# Patient Record
Sex: FEMALE | Race: WHITE | Employment: OTHER | URBAN - METROPOLITAN AREA
[De-identification: names, ages, dates, MRNs, and addresses within clinical notes are randomized per-mention and may not be internally consistent; named-entity substitution may affect disease eponyms.]

---

## 2022-03-02 ENCOUNTER — OFFICE VISIT (OUTPATIENT)
Dept: ORTHOPEDIC SURGERY | Age: 67
End: 2022-03-02
Payer: MEDICARE

## 2022-03-02 VITALS — WEIGHT: 110 LBS | BODY MASS INDEX: 19.49 KG/M2 | HEIGHT: 63 IN

## 2022-03-02 DIAGNOSIS — M12.811 ROTATOR CUFF ARTHROPATHY OF RIGHT SHOULDER: Primary | ICD-10-CM

## 2022-03-02 DIAGNOSIS — M25.511 RIGHT SHOULDER PAIN, UNSPECIFIED CHRONICITY: ICD-10-CM

## 2022-03-02 PROCEDURE — 1090F PRES/ABSN URINE INCON ASSESS: CPT | Performed by: ORTHOPAEDIC SURGERY

## 2022-03-02 PROCEDURE — G8427 DOCREV CUR MEDS BY ELIG CLIN: HCPCS | Performed by: ORTHOPAEDIC SURGERY

## 2022-03-02 PROCEDURE — 20610 DRAIN/INJ JOINT/BURSA W/O US: CPT | Performed by: ORTHOPAEDIC SURGERY

## 2022-03-02 PROCEDURE — 1123F ACP DISCUSS/DSCN MKR DOCD: CPT | Performed by: ORTHOPAEDIC SURGERY

## 2022-03-02 PROCEDURE — 99203 OFFICE O/P NEW LOW 30 MIN: CPT | Performed by: ORTHOPAEDIC SURGERY

## 2022-03-02 PROCEDURE — G8420 CALC BMI NORM PARAMETERS: HCPCS | Performed by: ORTHOPAEDIC SURGERY

## 2022-03-02 PROCEDURE — G8484 FLU IMMUNIZE NO ADMIN: HCPCS | Performed by: ORTHOPAEDIC SURGERY

## 2022-03-02 PROCEDURE — G8400 PT W/DXA NO RESULTS DOC: HCPCS | Performed by: ORTHOPAEDIC SURGERY

## 2022-03-02 PROCEDURE — 3017F COLORECTAL CA SCREEN DOC REV: CPT | Performed by: ORTHOPAEDIC SURGERY

## 2022-03-02 PROCEDURE — 4004F PT TOBACCO SCREEN RCVD TLK: CPT | Performed by: ORTHOPAEDIC SURGERY

## 2022-03-02 PROCEDURE — 4040F PNEUMOC VAC/ADMIN/RCVD: CPT | Performed by: ORTHOPAEDIC SURGERY

## 2022-03-02 RX ORDER — MELOXICAM 15 MG/1
15 TABLET ORAL DAILY
Qty: 30 TABLET | Refills: 5 | Status: SHIPPED | OUTPATIENT
Start: 2022-03-02 | End: 2022-05-04

## 2022-03-02 RX ORDER — METHYLPREDNISOLONE ACETATE 40 MG/ML
80 INJECTION, SUSPENSION INTRA-ARTICULAR; INTRALESIONAL; INTRAMUSCULAR; SOFT TISSUE ONCE
Status: COMPLETED | OUTPATIENT
Start: 2022-03-02 | End: 2022-03-02

## 2022-03-02 RX ORDER — LIDOCAINE HYDROCHLORIDE 20 MG/ML
4 INJECTION, SOLUTION INFILTRATION; PERINEURAL ONCE
Status: COMPLETED | OUTPATIENT
Start: 2022-03-02 | End: 2022-03-02

## 2022-03-02 RX ADMIN — LIDOCAINE HYDROCHLORIDE 4 ML: 20 INJECTION, SOLUTION INFILTRATION; PERINEURAL at 17:18

## 2022-03-02 RX ADMIN — METHYLPREDNISOLONE ACETATE 80 MG: 40 INJECTION, SUSPENSION INTRA-ARTICULAR; INTRALESIONAL; INTRAMUSCULAR; SOFT TISSUE at 17:19

## 2022-03-02 NOTE — PROGRESS NOTES
Chief Complaint    New Patient (R shoulder)      History of Present Illness:  Lindsey De Luna is a pleasant, 74 y.o. female here today on referral from her sister for evaluation of right shoulder pain. The patient states that she was in [place] a few weeks ago and was doing upper body workout when she started to have right shoulder pain. The pain is worse with overhead activities. She has been taking over the counter anti inflammatory without much relief. The pain wakes her up at night. The patient denies numbness or tingling. The patient has a history of open rotator cuff repair, right by Dr. Elvia Jackson around 20 years ago. .        Medical History:    Review of Systems   Constitutional: Positive for unexpected weight change. All other systems reviewed and are negative. Patient's medications, allergies, past medical, surgical, social and family histories were reviewed and updated as appropriate. Past Medical History:   Diagnosis Date    Anxiety     Cancer [facility])     COLON    Osteoarthritis       Social History     Socioeconomic History    Marital status:       Spouse name: Not on file    Number of children: Not on file    Years of education: Not on file    Highest education level: Not on file   Occupational History    Not on file   Tobacco Use    Smoking status: Former Smoker     Packs/day: 1.50     Years: 15.00     Pack years: 22.50     Quit date: 2001     Years since quittin.1    Smokeless tobacco: Never Used   Substance and Sexual Activity    Alcohol use: No    Drug use: No    Sexual activity: Not on file   Other Topics Concern    Not on file   Social History Narrative    Not on file     Social Determinants of Health     Financial Resource Strain:     Difficulty of Paying Living Expenses: Not on file   Food Insecurity:     Worried About [address] in the Last Year: Not on file    [address] in the Last Year: Not on HMari Ortiz Needs:     Lack of Transportation (Medical): Not on file    Lack of Transportation (Non-Medical): Not on file   Physical Activity:     Days of Exercise per Week: Not on file    Minutes of Exercise per Session: Not on file   Stress:     Feeling of Stress : Not on file   Social Connections:     Frequency of Communication with Friends and Family: Not on file    Frequency of Social Gatherings with Friends and Family: Not on file    Attends [employer]ism Services: Not on file    Active Member of [address] [place] [address] or Organizations: Not on file    Attends Club or Organization Meetings: Not on file    Marital Status: Not on file   Intimate Partner Violence:     Fear of Current or Ex-Partner: Not on file    Emotionally Abused: Not on file    Physically Abused: Not on file    Sexually Abused: Not on file   Housing Stability:     Unable to Pay for Housing in the Last Year: Not on file    Number of [place] in the Last Year: Not on file    Unstable Housing in the Last Year: Not on file       Allergies   Allergen Reactions    Sulfa Antibiotics Rash     High temp     Current Outpatient Medications on File Prior to Visit   Medication Sig Dispense Refill    citalopram (CELEXA) 40 MG tablet Take 40 mg by mouth daily      alendronate (FOSAMAX) 70 MG TABS Take by mouth      Multiple Vitamins-Minerals (THERAPEUTIC MULTIVITAMIN-MINERALS) tablet Take 1 tablet by mouth daily       No current facility-administered medications on file prior to visit. Review of Systems  A 14 point review of systems was completed by the patient on 3/2/2022 and is available in the media section of the scanned medical record and was reviewed on 3/2/2022. The review is negative with the exception of those things mentioned in the HPI and Past Medical History    Vital Signs: There were no vitals filed for this visit.     General Exam:   Constitutional: Patient is adequately groomed with no evidence of malnutrition      right Shoulder Rotator cuff arthropathy of right shoulder Yes       Office Procedures:  Orders Placed This Encounter   Procedures    XR SHOULDER RIGHT (MIN 2 VIEWS)     Standing Status:   Future     Number of Occurrences:   1     Standing Expiration Date:   3/2/2023     Order Specific Question:   Reason for exam:     Answer:   pain    [facility] Linette Pereira     Referral Priority:   Routine     Referral Type:   Eval and Treat     Referral Reason:   Specialty Services Required     Requested Specialty:   Physical Therapy     Number of Visits Requested:   1    AL ARTHROCENTESIS ASPIR&/INJ MAJOR JT/BURSA W/O US       Treatment Plan:  Pertinent imaging and examination findings were reviewed with Angel Baum. The etiology, natural history, and treatment options for the disorder were discussed. The roles of activity modifications, medications, cryotherapy and heat, injections, physical therapy, and surgical interventions were all described to the patient and questions were answered. We discussed the diagnosis of rotator cuff arthropathy. We discussed the conservative options including physical therapy, anti-inflammatories, and steroid injection. The patient would like to proceed with steroid injection during today's encounter. A prescription for meloxicam was sent to her pharmacy. Supervised formal physical therapy prescription was put in the system. After discussing the risks and benefits of a corticosteroid injection, Philip Marquez did state an understanding and gave verbal consent to proceed. After cleansing the injection site with Chlora-prep and using aseptic techniques,  2 CC of Depo Medrol 40mg/ml and 8 CC of 1% lidocaine were injected in the right  glenohumeral joint. She  tolerated the procedure well with no immediate adverse sequelae after the injection. A bandage was placed over the injection site. Appropriate post injections instructions were given to the patient.     Angel Baum will follow up in 4 weeks and/or as needed. They were in agreement with this plan and all questions were answered to the patient's satisfaction. They were encouraged to call with any questions. 5:08 PM  3/2/2022    May Ott MD  [facility] Clinical fellow    During this examination, IMay MD, functioned as a scribe for Dr. Arnie Allen. The history taking and physical examination were performed by Dr. Kevin Machuca. All counseling during the appointment was performed between the patient and Dr. Kevin Machuca.   ______________________  I was physically present and personally supervised the Orthopaedic Sports Medicine Fellow in the evaluation and development of a treatment plan for this patient. I personally interviewed the patient and performed a physical examination. In addition, I discussed the patient's condition and treatment options with them. I have also reviewed and agree with the past medical, family and social history unless otherwise noted. All of the patient's questions were answered. Corrie Machuca MD, PhD  3/2/2022

## 2022-03-02 NOTE — PROGRESS NOTES
Review of Systems   Constitutional: Positive for unexpected weight change. All other systems reviewed and are negative.

## 2022-03-02 NOTE — LETTER
Shoulder Elbow Rehabilitation Referral    Patient Name: Radha Garcia      YOB: 1955    Diagnosis:   1. Right shoulder pain, unspecified chronicity    2. Rotator cuff arthropathy of right shoulder          Post Op Instructions:  [] Continuous passive motion (CPM)  [] Elbow range of motion  [] Exercise in plane of scapula   []  Strengthening     [] Pulley and instruction    [x] Home exercise program (copy to patient)   [] Sling when arm at risk  [] Sling or brace at all times   [] AAROM: Forward elevation            [] AAROM: External rotation   [] Isometric external rotator strengthening [] AAROM: internal rotation: up the back  [] Isometric abductor strengthening  [] AAROM: Internal abduction     [] Isometric internal rotator strengthening [] AAROM: cross-body adduction             Stretching:     Strengthening:  [x] Four quadrant (FE, ER, IR, CBA)  [x] Rotator cuff (ER, IR, Abd)  [] Forward Elevation    [x] External Rotators     [] External Rotation    [x] Internal Rotators  [] Internal Rotation: up/back   [x] Abductors     [] Internal Rotation: supine in abduction  [] Flexors  [] Cross-body abduction    [] Extensors  [] Pendulum (FE, Abd/Add, cw/ccw)  [x] Scapular Stabilizers   [] Wall-walking (FE, Abd)    [x] Shoulder shrugs     [] Table slides      [x] Rhomboid pinch  [] Elbow (flex, ext, pron, sup)    [] Lat.  Pull downs     [] Medial epicondylitis program    [] Forward punch   [] Lateral epicondylitis program    [] Internal rotators     [] Progressive resistive exercises  [] Bench Press        [] Bench press plus  Activities:     [] Lateral pull-downs  [] Rowing     [x] Progressive two-hand supine press  [] Stepper/Exercise bike   [x] Biceps: curls/supination  [] Swimming  [] Water exercises    Modalities: PRN    Return to Sport:  [] Ultrasound     [] Plyometrics  [] Iontophoresis     [] Rhythmic stabilization  [] Moist heat     [] Core strengthening   [] Massage     [] Sports specific program:   [x] Cryotherapy      [] Electrical stimulation     [] Paraffin  [] Whirlpool  [] TENS    [x] Home exercise program (copy to patient). Perform exercises for:   15     minutes    2-3      times/day  [x] Supervised physical therapy  Frequency: []  1x week  [x] 2x week  [] 3x week  [] Other:   Duration: [] 2 weeks   [] 4 weeks  [x] 6 weeks  [] Other:     Additional Instructions:         Corrie Villafana MD, PhD

## 2022-03-08 ENCOUNTER — HOSPITAL ENCOUNTER (OUTPATIENT)
Dept: PHYSICAL THERAPY | Age: 67
Setting detail: THERAPIES SERIES
Discharge: HOME | End: 2022-03-08
Payer: MEDICARE

## 2022-03-08 PROCEDURE — 97110 THERAPEUTIC EXERCISES: CPT | Performed by: PHYSICAL THERAPIST

## 2022-03-08 PROCEDURE — 97161 PT EVAL LOW COMPLEX 20 MIN: CPT | Performed by: PHYSICAL THERAPIST

## 2022-03-08 NOTE — FLOWSHEET NOTE
[facility] and [address],  Sports Performance and Rehabilitation, [facility] [address]  [address] [facility],5[address] Floor   Jose Antonio Rodriguez  Phone: [phone]  Fax: [phone]      Physical Therapy Daily Treatment Note  Date:  3/8/2022    Patient Name:  Abelino Carnes    :  1955  MRN: [id number]  Restrictions/Precautions:    Medical/Treatment Diagnosis Information:  · Diagnosis: M25.511 (ICD-10-CM) - Right shoulder pain, unspecified [id number] (ICD-10-CM) - Rotator cuff arthropathy of right shoulder  ·    Insurance/Certification information:  PT Insurance Information: Medicare, 20%, no limit for visits  Physician Information:  Dr. Tree Mauricio  Has the plan of care been signed (Y/N):        []  Yes  [x]  No     Date of Patient follow up with Physician:       Is this a Progress Report:     []  Yes  [x]  No        If Yes:  Date Range for reporting period:  Beginning  Ending    Progress report will be due (10 Rx or 30 days whichever is less):  94    Recertification will be due (POC Duration  / 90 days whichever is less): 22         Visit # Insurance Allowable Auth Required   1 No limit []  Yes []  No        Functional Scale:45% UEFI   Date assessed: 3/8/22     Latex Allergy:  [x]NO      []YES  Preferred Language for Healthcare:   [x]English       []other:    Pain level:  6/10     SUBJECTIVE:  See eval     OBJECTIVE:   OBJECTIVE:     ROM PROM AROM  Comment    L R L R    Flexion WNL  148    Abduction WNL Pain at 90 165 154    ER WNL WNL      IR        Other  (cervical)        Other             Strength L R Comment   Flexion 4/5 4-/5    Abduction 4/5 4-/5 pain    ER 4+/5 4-/5 pain    IR 4-/5 4-/5    Supraspinatus      Upper Trap      Lower Trap      Mid Trap      Rhomboids      Biceps      Triceps      Horizontal Abduction      Horizontal Adduction      Lats            RESTRICTIONS/PRECAUTIONS: B RTC repair (20 years ago R, 6 years ago L)    Exercises/Interventions:   HEP:  Access Code: [email or web address]  URL: [email or web address]  Date: 03/08/2022  Prepared by: Job Hotter    Exercises  Seated Scapular Retraction - 1 x daily - 7 x weekly - 3 sets - 10 reps  Isometric Shoulder External Rotation at Wall - 1 x daily - 7 x weekly - 1 sets - 10 reps - 10 hold  Standing Isometric Shoulder Internal Rotation at Doorway - 1 x daily - 7 x weekly - 1 sets - 10 reps - 10 hold  Isometric Shoulder Flexion at Wall - 1 x daily - 7 x weekly - 1 sets - 10 reps - 10 hold    Exercises:  Exercise/Equipment Resistance/Repetitions Other comments   Stretching/PROM     Wand     Table Slides     UE [place]     Pulleys     Pendulum          Isometrics     Retraction X 30         Weight shift     Flexion 10 x 10 sec    Abduction     External Rotation 10 x 10 sec    Internal Rotation 10 x 10 sec    Biceps     Triceps          PRE's     Flexion     Abduction     External Rotation     Internal Rotation     Shrugs     EXT     Reverse Flys     Serratus     Horizontal Abd with ER     Biceps     Triceps     Retraction          Cable Column/Theraband     External Rotation     Internal Rotation     Shrugs     Lats     Ext     Flex     Scapular Retraction     BIC     TRIC     PNF          Dynamic Stability          Plyoback          Manual interventions                     Therapeutic Exercise and NMR EXR  [x] (63787) Provided verbal/tactile cueing for activities related to strengthening, flexibility, endurance, ROM  for improvements in scapular, scapulothoracic and UE control with self care, reaching, carrying, lifting, house/yardwork, driving/computer work.    [] (52507) Provided verbal/tactile cueing for activities related to improving balance, coordination, kinesthetic sense, posture, motor skill, proprioception  to assist with  scapular, scapulothoracic and UE control with self care, reaching, carrying, lifting, house/yardwork, driving/computer work.     Therapeutic Activities:    [] (12030 or 20480) Provided verbal/tactile cueing for activities related to improving balance, coordination, kinesthetic sense, posture, motor skill, proprioception and motor activation to allow for proper function of scapular, scapulothoracic and UE control with self care, carrying, lifting, driving/computer work.      Home Exercise Program:    [x] (30967) Reviewed/Progressed HEP activities related to strengthening, flexibility, endurance, ROM of scapular, scapulothoracic and UE control with self care, reaching, carrying, lifting, house/yardwork, driving/computer work  [] (48855) Reviewed/Progressed HEP activities related to improving balance, coordination, kinesthetic sense, posture, motor skill, proprioception of scapular, scapulothoracic and UE control with self care, reaching, carrying, lifting, house/yardwork, driving/computer work      Manual Treatments:  PROM / STM / Oscillations-Mobs:  G-I, II, III, IV (PA's, Inf., Post.)  [] (15516) Provided manual therapy to mobilize soft tissue/joints of cervical/CT, scapular GHJ and UE for the purpose of modulating pain, promoting relaxation,  increasing ROM, reducing/eliminating soft tissue swelling/inflammation/restriction, improving soft tissue extensibility and allowing for proper ROM for normal function with self care, reaching, carrying, lifting, house/yardwork, driving/computer work    Modalities:      Charges:  Timed Code Treatment Minutes: 60   Total Treatment Minutes: 15       [x] EVAL (LOW) 52957 (typically 20 minutes face-to-face)  [] EVAL (MOD) 94127 (typically 30 minutes face-to-face)  [] EVAL (HIGH) 69549 (typically 45 minutes face-to-face)  [] RE-EVAL     [x] VF(82450) x   1  [] IONTO  [] NMR (66182) x     [] VASO  [] Manual (69332) x      [] Other:  [] TA x      [] Mech Traction (26436)  [] ES(attended) (20552)      [] ES (un) (43313):     GOALS:  GOALS:   Patient stated goal: Patient to report improved ability with lifting weights and ADLs, including driving. [] Progressing: [] Met: [] Not Met: [] Adjusted    Therapist goals for Patient:   Short Term Goals: To be achieved in: 2 weeks  1. Independent in HEP and progression per patient tolerance, in order to prevent re-injury. [] Progressing: [] Met: [] Not Met: [] Adjusted   2. Patient will have a decrease in pain to facilitate improvement in movement, function, and ADLs as indicated by Functional Deficits. [] Progressing: [] Met: [] Not Met: [] Adjusted    Long Term Goals: To be achieved in: 6-8 weeks  1. Disability index score of 35% or less for the UEFS to assist with reaching prior level of function. [] Progressing: [] Met: [] Not Met: [] Adjusted  2. Patient will demonstrate increased AROM equal to L UE to allow for proper joint functioning as indicated by patients Functional Deficits. [] Progressing: [] Met: [] Not Met: [] Adjusted  3. Patient will demonstrate an increase in strength to good scapular and core control  for UE to allow for proper functional mobility as indicated by patients Functional Deficits. [] Progressing: [] Met: [] Not Met: [] Adjusted  4. Patient will return to ADLs functional activities without increased symptoms or restriction. [] Progressing: [] Met: [] Not Met: [] Adjusted  5. Patient to report returning to working out activities with pain 4/10 or less. [] Progressing: [] Met: [] Not Met: [] Adjusted        Overall Progression Towards Functional goals/ Treatment Progress Update:  [] Patient is progressing as expected towards functional goals listed. [] Progression is slowed due to complexities/Impairments listed. [] Progression has been slowed due to co-morbidities.   [x] Plan just implemented, too soon to assess goals progression <30days   [] Goals require adjustment due to lack of progress  [] Patient is not progressing as expected and requires additional follow up with physician  [] Other    Prognosis for POC: [x] Good [] Fair  [] Poor      Patient requires continued skilled intervention: [x] Yes  [] No    Treatment/Activity Tolerance:  [x] Patient able to complete treatment  [] Patient limited by fatigue  [] Patient limited by pain     [] Patient limited by other medical complications  [] Other:                         PLAN: See eval  [] Continue per plan of care [] Alter current plan (see comments above)  [x] Plan of care initiated [] Hold pending MD visit [] Discharge  Plan next visit:  Shld isometrics  Rows  Lawnmowers  Shld ext standing with band      Electronically signed by:  Gregoria Duarte PT    Note: If patient does not return for scheduled/ recommended follow up visits, this note will serve as a discharge from care along with most recent update on progress.

## 2022-03-08 NOTE — PLAN OF CARE
The [address] [facility] and [address],  [facility], Formerly [facility] [address]  793 [facility],5Th Floor   Jose Antonio Rodriguez  Phone: [phone]  Fax: [phone]       Physical Therapy Certification    Dear Dr. Harjinder Rodriguez,    We had the pleasure of evaluating the following patient for physical therapy services at [address] [place] [address]. A summary of our findings can be found in the initial assessment below. This includes our plan of care. If you have any questions or concerns regarding these findings, please do not hesitate to contact me at the office phone number checked above. Thank you for the referral.       Physician Signature:_______________________________Date:__________________  By signing above (or electronic signature), therapists plan is approved by physician      Patient: Danisha Ross   : 1955   MRN: [id number]  Referring Physician: Dr. Harjinder Rodriguez      Evaluation Date: 3/8/2022      Medical Diagnosis Information:  Diagnosis: K25.011 (ICD-10-CM) - Right shoulder pain, unspecified [id number] (ICD-10-CM) - Rotator cuff arthropathy of right shoulder                                             Insurance information: PT Insurance Information: Medicare, 20%, no limit for visits    Precautions/ Contra-indications: R (20 years ago) and L RTC (6 years ago) repairs    C-SSRS Triggered by Intake questionnaire (Past 2 wk assessment):   [x] No, Questionnaire did not trigger screening.   [] Yes, Patient intake triggered further evaluation      [] C-SSRS Screening completed  [] PCP notified via Plan of Care  [] Emergency services notified     Latex Allergy:  [x]NO      []YES  Preferred Language for Healthcare:   [x]English       []other:    SUBJECTIVE: Patient stated complaint: Patient reports to PT with diagnosis of R shoulder pain. She reports R anterior shld pain beginning 5 weeks ago; had R RTC sx 20 years ago.   Five weeks ago, reports using weights, including standing shoulder activities. She reports pain in R shld began after this with a constant ache and increased pain with pushing and elevating; pain ranking 6/10. She was a prescribed a pain medicine March 2, which has helped. Also, got injection on March 2nd which helped slightly. Got X-ray; Dr. Stephenson How there is an old tear. Currently, pain with sleeping, driving, and reaching overhead. Patient reports wanting to return to lifting weights. Sees  April 6th. R handed. Relevant Medical History:RTC repairs B  Functional Disability Index:45% deficit UEFI     Pain Scale: 6/10  Easing factors: rest  Provocative factors: driving, reaching, pushing     Type: []Constant   []Intermittent  []Radiating []Localized []other:     Numbness/Tingling: N/A    Occupation/School: N/A    Living Status/Prior Level of Function: Independent with ADLs and IADLs,     OBJECTIVE:     ROM PROM AROM  Comment    L R L R    Flexion WNL  148    Abduction WNL Pain at 90 165 154    ER WNL WNL      IR        Other  (cervical)        Other             Strength L R Comment   Flexion 4/5 4-/5    Abduction 4/5 4-/5 pain    ER 4+/5 4-/5 pain    IR 4-/5 4-/5    Supraspinatus      Upper Trap      Lower Trap      Mid Trap      Rhomboids      Biceps      Triceps      Horizontal Abduction      Horizontal Adduction      Lats        Special Tests Results/Comment   Alaina    Neers    Speeds    OBriens    Apprehension     Load & Shift         Reflexes/Sensation:               [x]Dermatomes/Myotomes intact               []Reflexes equal and normal bilaterally               []Other:     Joint mobility:               []Normal               []Hypo              []Hyper     Palpation:      Functional Mobility/Transfers:      Posture:      Bandages/Dressings/Incisions:      Gait: (include devices/WB status):      Orthopedic Special Tests:                        [x] Patient history, allergies, meds reviewed.  Medical chart reviewed. See intake form. Review Of Systems (ROS):  [x]Performed Review of systems (Integumentary, CardioPulmonary, Neurological) by intake and observation. Intake form has been scanned into medical record. Patient has been instructed to contact their primary care physician regarding ROS issues if not already being addressed at this time. Co-morbidities/Complexities (which will affect course of rehabilitation):   []None              Arthritic conditions   []Rheumatoid arthritis (M05.9)  []Osteoarthritis (M19.91)    Cardiovascular conditions   []Hypertension (I10)  []Hyperlipidemia (E78.5)  []Angina pectoris (I20)  []Atherosclerosis (I70)    Musculoskeletal conditions   []Disc pathology   []Congenital spine pathologies   []Prior surgical intervention  []Osteoporosis (M81.8)  []Osteopenia (M85.8)   Endocrine conditions   []Hypothyroid (E03.9)  []Hyperthyroid Gastrointestinal conditions   []Constipation (C38.16)    Metabolic conditions   []Morbid obesity (E66.01)  []Diabetes type 1(E10.65) or 2 (E11.65)   []Neuropathy (G60.9)      Pulmonary conditions   []Asthma (J45)  []Coughing   []COPD (J44.9)    Psychological Disorders  []Anxiety (F41.9)  []Depression (F32.9)   []Other:    []Other:            Barriers to/and or personal factors that will affect rehab potential:              []Age  []Sex              []Motivation/Lack of Motivation                        []Co-Morbidities              []Cognitive Function, education/learning barriers              []Environmental, home barriers              []profession/work barriers  []past PT/medical experience  []other:  Justification:      Falls Risk Assessment (30 days):   [x] Falls Risk assessed and no intervention required.   [] Falls Risk assessed and Patient requires intervention due to being higher risk   TUG score (>12s at risk):     [] Falls education provided, including      G-Codes:          ASSESSMENT:   Functional Impairments              []Noted spinal or UE joint hypomobility              []Noted spinal or UE joint hypermobility              []Decreased UE functional ROM              []Decreased UE functional strength              []Abnormal reflexes/sensation/myotomal/dermatomal deficits              []Decreased RC/scapular/core strength and neuromuscular control              []other:       Functional Activity Limitations (from functional questionnaire and intake)              []Reduced ability to tolerate prolonged functional positions              []Reduced ability or difficulty with changes of positions or transfers between positions              []Reduced ability to maintain good posture and demonstrate good body mechanics with sitting, bending, and lifting              [] Reduced ability or tolerance with driving and/or computer work              []Reduced ability to sleep              []Reduced ability to perform lifting, reaching, carrying tasks              []Reduced ability to tolerate impact through UE              []Reduced ability to reach behind back              []Reduced ability to  or hold objects              []Reduced ability to throw or toss an object              []other:       Participation Restrictions              []Reduced participation in self care activities              []Reduced participation in home management activities              []Reduced participation in work activities              []Reduced participation in social activities. []Reduced participation in sport / recreational activities. Classification:              []Signs/symptoms consistent with post-surgical status including decreased ROM, strength and function.   []Signs/symptoms consistent with joint sprain/strain              []Signs/symptoms consistent with shoulder impingement              []Signs/symptoms consistent with shoulder/elbow/wrist tendinopathy              []Signs/symptoms consistent with Rotator cuff tear              []Signs/symptoms consistent with labral tear              []Signs/symptoms consistent with postural dysfunction                         []Signs/symptoms consistent with Glenohumeral IR Deficit - <45 degrees              []Signs/symptoms consistent with facet dysfunction of cervical/thoracic spine                         []Signs/symptoms consistent with pathology which may benefit from Dry needling                []other:      Prognosis/Rehab Potential:                                       []Excellent              [x]Good                 []Fair              []Poor     Tolerance of evaluation/treatment:               []Excellent              [x]Good                 []Fair              []Poor     Physical Therapy Evaluation Complexity Justification  [x] A history of present problem with:  [] no personal factors and/or comorbidities that impact the plan of care;  []1-2 personal factors and/or comorbidities that impact the plan of care  []3 personal factors and/or comorbidities that impact the plan of care  [x] An examination of body systems using standardized tests and measures addressing any of the following: body structures and functions (impairments), activity limitations, and/or participation restrictions;:  [] a total of 1-2 or more elements   [] a total of 3 or more elements   [] a total of 4 or more elements   [x] A clinical presentation with:  [] stable and/or uncomplicated characteristics   [] evolving clinical presentation with changing characteristics  [] unstable and unpredictable characteristics;   [x] Clinical decision making of [] low, [] moderate, [] high complexity using standardized patient assessment instrument and/or measurable assessment of functional outcome.      [] EVAL (LOW) 75365 (typically 20 minutes face-to-face)  [] EVAL (MOD) 55569 (typically 30 minutes face-to-face)  [] EVAL (HIGH) 32627 (typically 45 minutes face-to-face)  [] RE-EVAL         PLAN:  Frequency/Duration:  2 days per week for 6-8 Weeks: INTERVENTIONS:  [x] Therapeutic exercise including: strength training, ROM, for Upper extremity and core   [x]  NMR activation and proprioception for UE, scap and Core   [x] Manual therapy as indicated for shoulder, scapula and spine to include: Dry Needling/IASTM, STM, PROM, Gr I-IV mobilizations, manipulation. [x] Modalities as needed that may include: thermal agents, E-stim, Biofeedback, US, iontophoresis as indicated  [x] Patient education on joint protection, postural re-education, activity modification, progression of HEP. GOALS:   Patient stated goal: Patient to report improved ability with lifting weights and ADLs, including driving. [] Progressing: [] Met: [] Not Met: [] Adjusted    Therapist goals for Patient:   Short Term Goals: To be achieved in: 2 weeks  1. Independent in HEP and progression per patient tolerance, in order to prevent re-injury. [] Progressing: [] Met: [] Not Met: [] Adjusted   2. Patient will have a decrease in pain to facilitate improvement in movement, function, and ADLs as indicated by Functional Deficits. [] Progressing: [] Met: [] Not Met: [] Adjusted    Long Term Goals: To be achieved in: 6-8 weeks  1. Disability index score of 35% or less for the UEFS to assist with reaching prior level of function. [] Progressing: [] Met: [] Not Met: [] Adjusted  2. Patient will demonstrate increased AROM equal to L UE to allow for proper joint functioning as indicated by patients Functional Deficits. [] Progressing: [] Met: [] Not Met: [] Adjusted  3. Patient will demonstrate an increase in strength to good scapular and core control  for UE to allow for proper functional mobility as indicated by patients Functional Deficits. [] Progressing: [] Met: [] Not Met: [] Adjusted  4. Patient will return to ADLs functional activities without increased symptoms or restriction. [] Progressing: [] Met: [] Not Met: [] Adjusted  5.  Patient to report returning to working out activities with pain 4/10 or less. [] Progressing: [] Met: [] Not Met: [] Adjusted    Electronically signed by:  Briseyda Neff PT    Note: If patient does not return for scheduled/ recommended follow up visits, this note will serve as a discharge from care along with most recent update on progress.

## 2022-03-11 ENCOUNTER — HOSPITAL ENCOUNTER (OUTPATIENT)
Dept: PHYSICAL THERAPY | Age: 67
Setting detail: THERAPIES SERIES
Discharge: HOME | End: 2022-03-11
Payer: MEDICARE

## 2022-03-11 PROCEDURE — 97140 MANUAL THERAPY 1/> REGIONS: CPT | Performed by: PHYSICAL THERAPIST

## 2022-03-11 PROCEDURE — 97110 THERAPEUTIC EXERCISES: CPT | Performed by: PHYSICAL THERAPIST

## 2022-03-11 NOTE — FLOWSHEET NOTE
The [facility] and [address],  [facility], [facility] [address]  793 [facility],5Th Floor   Jose Antonio Rodriguez  Phone: [phone]  Fax: [phone]      Physical Therapy Daily Treatment Note  Date:  3/11/2022    Patient Name:  Bette Gu    :  1955  MRN: [id number]  Restrictions/Precautions:    Medical/Treatment Diagnosis Information:  ·   Diagnosis: M25.511 (ICD-10-CM) - Right shoulder pain, unspecified chronicity  · M12.811 (ICD-10-CM) - Rotator cuff arthropathy of right shoulder       Insurance/Certification information:     Physician Information:  Dr. Dylon Christianson  Has the plan of care been signed (Y/N):        []  Yes  [x]  No     Date of Patient follow up with Physician:       Is this a Progress Report:     []  Yes  [x]  No        If Yes:  Date Range for reporting period:  Beginning  Ending    Progress report will be due (10 Rx or 30 days whichever is less):  3/6/01    Recertification will be due (POC Duration  / 90 days whichever is less): 22         Visit # Insurance Allowable Auth Required   2 No limit []  Yes []  No        Functional Scale:45% UEFI   Date assessed: 3/8/22     Latex Allergy:  [x]NO      []YES  Preferred Language for Healthcare:   [x]English       []other:    Pain level:  5/10     SUBJECTIVE:  Patient reports achiness in R shld begins in the afternoon. She reports compliance with HEP, but increased pain with ER CHUCK.      OBJECTIVE:   OBJECTIVE:     ROM PROM AROM  Comment    L R L R    Flexion WNL  148    Abduction WNL Pain at 90 165 154    ER WNL WNL      IR        Other  (cervical)        Other             Strength L R Comment   Flexion 4/5 4-/5    Abduction 4/5 4-/5 pain    ER 4+/5 4-/5 pain    IR 4-/5 4-/5    Supraspinatus      Upper Trap      Lower Trap      Mid Trap      Rhomboids      Biceps      Triceps      Horizontal Abduction      Horizontal Adduction      Lats RESTRICTIONS/PRECAUTIONS: B RTC repair (20 years ago R, 6 years ago L)    Exercises/Interventions:   HEP:  Access Code: [email or web address]  URL: [email or web address]  Date: 03/08/2022  Prepared by: Kellee Dumont    Exercises  Seated Scapular Retraction - 1 x daily - 7 x weekly - 3 sets - 10 reps  Isometric Shoulder External Rotation at Wall - 1 x daily - 7 x weekly - 1 sets - 10 reps - 10 hold  Standing Isometric Shoulder Internal Rotation at Doorway - 1 x daily - 7 x weekly - 1 sets - 10 reps - 10 hold  Isometric Shoulder Flexion at Wall - 1 x daily - 7 x weekly - 1 sets - 10 reps - 10 hold    Exercises:  Exercise/Equipment Resistance/Repetitions Other comments   Stretching/PROM     Wand     Table Slides     UE Cotton     Pulleys     Pendulum          Isometrics     Retraction        Weight shift    Flexion    Abduction    External Rotation Skipped secondary to increase in pain   Internal Rotation    Biceps     Triceps          PRE's     Flexion     Abduction     External Rotation     Internal Rotation     Shrugs     EXT     Reverse Flys     Serratus Punch 2 x10 supine   Lawnmower 2 x 10 Standing, 0#   Biceps     Triceps     Retraction          Cable Column/Theraband     External Rotation     Internal Rotation 2 x 10 Yellow tb   Shrugs     Lats     Ext     Flex     Rows 2 x10 Yellow tb   BIC     TRIC     PNF          Dynamic Stability          Plyoback          Manual interventions PROM/light mobilizations (AP, caudally) x 10 Increased pain with shld flex/abd at ~90                   Therapeutic Exercise and NMR EXR  [x] (76957) Provided verbal/tactile cueing for activities related to strengthening, flexibility, endurance, ROM  for improvements in scapular, scapulothoracic and UE control with self care, reaching, carrying, lifting, house/yardwork, driving/computer work.    [] (03781) Provided verbal/tactile cueing for activities related to improving balance, coordination, kinesthetic sense, posture, motor skill, proprioception  to assist with  scapular, scapulothoracic and UE control with self care, reaching, carrying, lifting, house/yardwork, driving/computer work. Therapeutic Activities:    [] (80698 or 65274) Provided verbal/tactile cueing for activities related to improving balance, coordination, kinesthetic sense, posture, motor skill, proprioception and motor activation to allow for proper function of scapular, scapulothoracic and UE control with self care, carrying, lifting, driving/computer work.      Home Exercise Program:    [x] (92854) Reviewed/Progressed HEP activities related to strengthening, flexibility, endurance, ROM of scapular, scapulothoracic and UE control with self care, reaching, carrying, lifting, house/yardwork, driving/computer work  [] (30876) Reviewed/Progressed HEP activities related to improving balance, coordination, kinesthetic sense, posture, motor skill, proprioception of scapular, scapulothoracic and UE control with self care, reaching, carrying, lifting, house/yardwork, driving/computer work      Manual Treatments:  PROM / STM / Oscillations-Mobs:  G-I, II, III, IV (PA's, Inf., Post.)  [] (49771) Provided manual therapy to mobilize soft tissue/joints of cervical/CT, scapular GHJ and UE for the purpose of modulating pain, promoting relaxation,  increasing ROM, reducing/eliminating soft tissue swelling/inflammation/restriction, improving soft tissue extensibility and allowing for proper ROM for normal function with self care, reaching, carrying, lifting, house/yardwork, driving/computer work    Modalities:  Declined ice/heat  Charges:  Timed Code Treatment Minutes: 45   Total Treatment Minutes: 45       [] EVAL (LOW) 94649 (typically 20 minutes face-to-face)  [] EVAL (MOD) 24449 (typically 30 minutes face-to-face)  [] EVAL (HIGH) 21925 (typically 45 minutes face-to-face)  [] RE-EVAL     [x] UO(10970) x   2  [] IONTO  [] NMR (97926) x     [] VASO  [x] Manual (59596) x  1    [] Other:  [] TA x      [] [facility] Traction (96243)  [] ES(attended) (52659)      [] ES (un) (34658):     GOALS:  GOALS:   Patient stated goal: Patient to report improved ability with lifting weights and ADLs, including driving. [x] Progressing: [] Met: [] Not Met: [] Adjusted    Therapist goals for Patient:   Short Term Goals: To be achieved in: 2 weeks  1. Independent in HEP and progression per patient tolerance, in order to prevent re-injury. [x] Progressing: [] Met: [] Not Met: [] Adjusted   2. Patient will have a decrease in pain to facilitate improvement in movement, function, and ADLs as indicated by Functional Deficits. [x] Progressing: [] Met: [] Not Met: [] Adjusted    Long Term Goals: To be achieved in: 6-8 weeks  1. Disability index score of 35% or less for the UEFS to assist with reaching prior level of function. [x] Progressing: [] Met: [] Not Met: [] Adjusted  2. Patient will demonstrate increased AROM equal to L UE to allow for proper joint functioning as indicated by patients Functional Deficits. [x] Progressing: [] Met: [] Not Met: [] Adjusted  3. Patient will demonstrate an increase in strength to good scapular and core control  for UE to allow for proper functional mobility as indicated by patients Functional Deficits. [x] Progressing: [] Met: [] Not Met: [] Adjusted  4. Patient will return to ADLs functional activities without increased symptoms or restriction. [x] Progressing: [] Met: [] Not Met: [] Adjusted  5. Patient to report returning to working out activities with pain 4/10 or less. [x] Progressing: [] Met: [] Not Met: [] Adjusted        Overall Progression Towards Functional goals/ Treatment Progress Update:  [] Patient is progressing as expected towards functional goals listed. [] Progression is slowed due to complexities/Impairments listed. [] Progression has been slowed due to co-morbidities.   [x] Plan just implemented, too soon to assess goals progression <30days [] Goals require adjustment due to lack of progress  [] Patient is not progressing as expected and requires additional follow up with physician  [] Other    Prognosis for POC: [x] Good [] Fair  [] Poor      Patient requires continued skilled intervention: [x] Yes  [] No    Treatment/Activity Tolerance:  [x] Patient able to complete treatment  [] Patient limited by fatigue  [] Patient limited by pain     [] Patient limited by other medical complications  [x] Other: Patient reported 3-4/10 pain following PT tx, but did well with PT activities. Held on ER and elevation activities secondary to pain. PLAN: See eval  [] Continue per plan of care [] Alter current plan (see comments above)  [x] Plan of care initiated [] Hold pending MD visit [] Discharge  Plan next visit:  Monitor symptoms with potential elevation activities  - Scaption    Electronically signed by:  Charles Ramirez PT, MPT    Note: If patient does not return for scheduled/ recommended follow up visits, this note will serve as a discharge from care along with most recent update on progress.

## 2022-03-15 ENCOUNTER — HOSPITAL ENCOUNTER (OUTPATIENT)
Dept: PHYSICAL THERAPY | Age: 67
Setting detail: THERAPIES SERIES
Discharge: HOME | End: 2022-03-15
Payer: MEDICARE

## 2022-03-15 PROCEDURE — 97140 MANUAL THERAPY 1/> REGIONS: CPT

## 2022-03-15 PROCEDURE — 97110 THERAPEUTIC EXERCISES: CPT

## 2022-03-15 NOTE — FLOWSHEET NOTE
[facility] and [address],  Sports Performance and Rehabilitation, [facility] [address]  [address] [facility],5Th Floor   Jose Antonio Rodriguez  Phone: [phone]  Fax: [phone]      Physical Therapy Daily Treatment Note  Date:  3/15/2022    Patient Name:  Aaron Lynne    :  1955  MRN: [id number]  Restrictions/Precautions:    Medical/Treatment Diagnosis Information:  ·   Diagnosis: M25.511 (ICD-10-CM) - Right shoulder pain, unspecified chronicity  · M12.811 (ICD-10-CM) - Rotator cuff arthropathy of right shoulder       Insurance/Certification information:     Physician Information:  Dr. Maggie Gaona  Has the plan of care been signed (Y/N):        []  Yes  [x]  No     Date of Patient follow up with Physician:       Is this a Progress Report:     []  Yes  [x]  No        If Yes:  Date Range for reporting period:  Beginning  Ending    Progress report will be due (10 Rx or 30 days whichever is less):  34    Recertification will be due (POC Duration  / 90 days whichever is less): 22         Visit # Insurance Allowable Auth Required   3 No limit []  Yes []  No        Functional Scale:45% UEFI   Date assessed: 3/8/22     Latex Allergy:  [x]NO      []YES  Preferred Language for Healthcare:   [x]English       []other:    Pain level:  5-6/10     SUBJECTIVE:  Patient reports feeling pretty good following PT visit. Last night, started to get increased pain R shoulder feeling like a \"bruise\"; reports unsure of cause, but have pushed too much with ER CHUCK.      OBJECTIVE:   OBJECTIVE:     ROM PROM AROM  Comment    L R L R    Flexion WNL  148    Abduction WNL Pain at 90 165 154    ER WNL WNL      IR        Other  (cervical)        Other             Strength L R Comment   Flexion 4/5 4-/5    Abduction 4/5 4-/5 pain    ER 4+/5 4-/5 pain    IR 4-/5 4-/5    Supraspinatus      Upper Trap      Lower Trap      Mid Trap      Rhomboids      Biceps      Triceps Horizontal Abduction      Horizontal Adduction      Lats            RESTRICTIONS/PRECAUTIONS: B RTC repair (20 years ago R, 6 years ago L)    Exercises/Interventions:   HEP:  Access Code: [email or web address]  URL: [email or web address]  Date: 03/08/2022  Prepared by: Raji Sanchez    Exercises  Seated Scapular Retraction - 1 x daily - 7 x weekly - 3 sets - 10 reps  Isometric Shoulder External Rotation at Wall - 1 x daily - 7 x weekly - 1 sets - 10 reps - 10 hold  Standing Isometric Shoulder Internal Rotation at Doorway - 1 x daily - 7 x weekly - 1 sets - 10 reps - 10 hold  Isometric Shoulder Flexion at Wall - 1 x daily - 7 x weekly - 1 sets - 10 reps - 10 hold    Exercises:  Exercise/Equipment Resistance/Repetitions Other comments   Stretching/PROM     Posterior capsular stretch 3 x 30 sec    Table Slides     UE Anchor     Pulleys     Pendulum          Isometrics     Retraction        Weight shift    Flexion    Abduction    External Rotation Skipped secondary to increase in pain   Internal Rotation    Biceps     Triceps          PRE's     Flexion     Abduction     External Rotation     Internal Rotation     Shrugs     EXT     Reverse Flys     Serratus Punch 2 x10 supine   Lawnmower 2 x 10 Standing, 0#   Biceps     Triceps     Retraction          Cable Column/Theraband     External Rotation     Internal Rotation 2 x 10 Yellow tb   Shrugs     Lats     Ext     Flex     Rows 2 x10 red tb   BIC     TRIC     PNF          Dynamic Stability          Plyoback          Manual interventions Oscillations/ light mobilizations (AP, caudally) x 10 Increased pain with abd at ~90                   Therapeutic Exercise and NMR EXR  [x] (95345) Provided verbal/tactile cueing for activities related to strengthening, flexibility, endurance, ROM  for improvements in scapular, scapulothoracic and UE control with self care, reaching, carrying, lifting, house/yardwork, driving/computer work.    [] (75357) Provided verbal/tactile cueing for activities related to improving balance, coordination, kinesthetic sense, posture, motor skill, proprioception  to assist with  scapular, scapulothoracic and UE control with self care, reaching, carrying, lifting, house/yardwork, driving/computer work. Therapeutic Activities:    [] (46660 or 99581) Provided verbal/tactile cueing for activities related to improving balance, coordination, kinesthetic sense, posture, motor skill, proprioception and motor activation to allow for proper function of scapular, scapulothoracic and UE control with self care, carrying, lifting, driving/computer work.      Home Exercise Program:    [x] (08138) Reviewed/Progressed HEP activities related to strengthening, flexibility, endurance, ROM of scapular, scapulothoracic and UE control with self care, reaching, carrying, lifting, house/yardwork, driving/computer work  [] (56453) Reviewed/Progressed HEP activities related to improving balance, coordination, kinesthetic sense, posture, motor skill, proprioception of scapular, scapulothoracic and UE control with self care, reaching, carrying, lifting, house/yardwork, driving/computer work      Manual Treatments:  PROM / STM / Oscillations-Mobs:  G-I, II, III, IV (PA's, Inf., Post.)  [x] (77360) Provided manual therapy to mobilize soft tissue/joints of cervical/CT, scapular GHJ and UE for the purpose of modulating pain, promoting relaxation,  increasing ROM, reducing/eliminating soft tissue swelling/inflammation/restriction, improving soft tissue extensibility and allowing for proper ROM for normal function with self care, reaching, carrying, lifting, house/yardwork, driving/computer work    Modalities:  Declined ice/heat  Charges:  Timed Code Treatment Minutes: 45   Total Treatment Minutes: 45       [] EVAL (LOW) 64366 (typically 20 minutes face-to-face)  [] EVAL (MOD) 28768 (typically 30 minutes face-to-face)  [] EVAL (HIGH) 97517 (typically 45 minutes face-to-face)  [] RE-EVAL [x] WG(67768) x   2  [] IONTO  [] NMR (63726) x     [] VASO  [x] Manual (82912) x  1    [] Other:  [] TA x      [] Mech Traction (26663)  [] ES(attended) (50061)      [] ES (un) (88505):     GOALS:  GOALS:   Patient stated goal: Patient to report improved ability with lifting weights and ADLs, including driving. [x] Progressing: [] Met: [] Not Met: [] Adjusted    Therapist goals for Patient:   Short Term Goals: To be achieved in: 2 weeks  1. Independent in HEP and progression per patient tolerance, in order to prevent re-injury. [x] Progressing: [] Met: [] Not Met: [] Adjusted   2. Patient will have a decrease in pain to facilitate improvement in movement, function, and ADLs as indicated by Functional Deficits. [x] Progressing: [] Met: [] Not Met: [] Adjusted    Long Term Goals: To be achieved in: 6-8 weeks  1. Disability index score of 35% or less for the UEFS to assist with reaching prior level of function. [x] Progressing: [] Met: [] Not Met: [] Adjusted  2. Patient will demonstrate increased AROM equal to L UE to allow for proper joint functioning as indicated by patients Functional Deficits. [x] Progressing: [] Met: [] Not Met: [] Adjusted  3. Patient will demonstrate an increase in strength to good scapular and core control  for UE to allow for proper functional mobility as indicated by patients Functional Deficits. [x] Progressing: [] Met: [] Not Met: [] Adjusted  4. Patient will return to ADLs functional activities without increased symptoms or restriction. [x] Progressing: [] Met: [] Not Met: [] Adjusted  5. Patient to report returning to working out activities with pain 4/10 or less. [x] Progressing: [] Met: [] Not Met: [] Adjusted        Overall Progression Towards Functional goals/ Treatment Progress Update:  [] Patient is progressing as expected towards functional goals listed. [] Progression is slowed due to complexities/Impairments listed.   [] Progression has been slowed due to co-morbidities. [x] Plan just implemented, too soon to assess goals progression <30days   [] Goals require adjustment due to lack of progress  [] Patient is not progressing as expected and requires additional follow up with physician  [] Other    Prognosis for POC: [x] Good [] Fair  [] Poor      Patient requires continued skilled intervention: [x] Yes  [] No    Treatment/Activity Tolerance:  [x] Patient able to complete treatment  [] Patient limited by fatigue  [] Patient limited by pain     [] Patient limited by other medical complications  [x] Other: Patient reported 4-5/10 pain following PT tx, but did well with PT activities. Still held on ER and elevation activities secondary to pain. Added posterior capsular stretching reaching across her body. PLAN: See eval  [] Continue per plan of care [] Alter current plan (see comments above)  [x] Plan of care initiated [] Hold pending MD visit [] Discharge  Plan next visit:  Monitor symptoms with potential elevation activities  - Scaption    Electronically signed by:  Vitor Miller, PT, MPT    Note: If patient does not return for scheduled/ recommended follow up visits, this note will serve as a discharge from care along with most recent update on progress.

## 2022-03-22 ENCOUNTER — HOSPITAL ENCOUNTER (OUTPATIENT)
Dept: PHYSICAL THERAPY | Age: 67
Setting detail: THERAPIES SERIES
Discharge: HOME | End: 2022-03-22
Payer: MEDICARE

## 2022-03-22 PROCEDURE — 97110 THERAPEUTIC EXERCISES: CPT

## 2022-03-22 PROCEDURE — 97140 MANUAL THERAPY 1/> REGIONS: CPT

## 2022-03-22 NOTE — FLOWSHEET NOTE
[facility] and [address],  Sports Performance and Rehabilitation, [facility] [address]  793 [facility],5[address]   Jose Antonio Rodriguez  Phone: [phone]  Fax: [phone]      Physical Therapy Daily Treatment Note  Date:  3/22/2022    Patient Name:  Malvin Humphrey    :  1955  MRN: [id number]  Restrictions/Precautions:    Medical/Treatment Diagnosis Information:  ·   Diagnosis: M25.511 (ICD-10-CM) - Right shoulder pain, unspecified chronicity  · M12.811 (ICD-10-CM) - Rotator cuff arthropathy of right shoulder       Insurance/Certification information:     Physician Information:  Dr. Merlyn Barry  Has the plan of care been signed (Y/N):        []  Yes  [x]  No     Date of Patient follow up with Physician:       Is this a Progress Report:     []  Yes  [x]  No        If Yes:  Date Range for reporting period:  Beginning  Ending    Progress report will be due (10 Rx or 30 days whichever is less):  51    Recertification will be due (POC Duration  / 90 days whichever is less): 22         Visit # Insurance Allowable Auth Required   4 No limit []  Yes []  No        Functional Scale:45% UEFI   Date assessed: 3/8/22     Latex Allergy:  [x]NO      []YES  Preferred Language for Healthcare:   [x]English       []other:    Pain level:  3/10, achy    SUBJECTIVE:  Patient reports feeling pretty fine following PT visits. Reports good and bad days with shoulder; feels better when she doesn't use it and avoids ER exercises. Reports shoulder blades feel stronger.      OBJECTIVE:   OBJECTIVE:     ROM PROM AROM  Comment    L R L R    Flexion WNL  148    Abduction WNL Pain at 90 165 154    ER WNL WNL      IR        Other  (cervical)        Other             Strength L R Comment   Flexion 4/5 4-/5    Abduction 4/5 4-/5 pain    ER 4+/5 4-/5 pain    IR 4-/5 4-/5    Supraspinatus      Upper Trap      Lower Trap      Mid Trap      Rhomboids      Biceps      Triceps Horizontal Abduction      Horizontal Adduction      Lats            RESTRICTIONS/PRECAUTIONS: B RTC repair (20 years ago R, 6 years ago L)    Exercises/Interventions:   HEP:  Access Code: [email or web address]  URL: [email or web address]  Date: 03/08/2022  Prepared by: Elia Thompson    Exercises  Seated Scapular Retraction - 1 x daily - 7 x weekly - 3 sets - 10 reps  Isometric Shoulder External Rotation at Wall - 1 x daily - 7 x weekly - 1 sets - 10 reps - 10 hold  Standing Isometric Shoulder Internal Rotation at Doorway - 1 x daily - 7 x weekly - 1 sets - 10 reps - 10 hold  Isometric Shoulder Flexion at Wall - 1 x daily - 7 x weekly - 1 sets - 10 reps - 10 hold    Exercises:  Exercise/Equipment Resistance/Repetitions Other comments   Stretching/PROM     Posterior capsular stretch 3 x 30 sec    Table Slides     UE [place]     Pulleys     Pendulum          Isometrics     Retraction        Weight shift    Flexion    Abduction    External Rotation Skipped secondary to increase in pain   Internal Rotation    Biceps     Triceps          PRE's     Flexion     Abduction     External Rotation     Internal Rotation     Prone Rows 2 x10    EXT     Reverse Flys     Serratus Punch 2 x10 supine   Lawnmower 2 x 10 Standing, 0#   Biceps     Triceps     Retraction          Cable Column/Theraband     External Rotation     Internal Rotation 2 x 10 Yellow tb   Shrugs     Lats     Ext 2 x 10 Yellow tb   Flex     Rows 2 x10 red tb   BIC     TRIC     PNF          Dynamic Stability          Plyoback          Manual interventions Oscillations/ light mobilizations (AP, caudally) x 10 Increased pain with abd at ~90                   Therapeutic Exercise and NMR EXR  [x] (05427) Provided verbal/tactile cueing for activities related to strengthening, flexibility, endurance, ROM  for improvements in scapular, scapulothoracic and UE control with self care, reaching, carrying, lifting, house/yardwork, driving/computer work.    [] (13675) Provided verbal/tactile cueing for activities related to improving balance, coordination, kinesthetic sense, posture, motor skill, proprioception  to assist with  scapular, scapulothoracic and UE control with self care, reaching, carrying, lifting, house/yardwork, driving/computer work. Therapeutic Activities:    [] (81633 or 07008) Provided verbal/tactile cueing for activities related to improving balance, coordination, kinesthetic sense, posture, motor skill, proprioception and motor activation to allow for proper function of scapular, scapulothoracic and UE control with self care, carrying, lifting, driving/computer work.      Home Exercise Program:    [x] (31311) Reviewed/Progressed HEP activities related to strengthening, flexibility, endurance, ROM of scapular, scapulothoracic and UE control with self care, reaching, carrying, lifting, house/yardwork, driving/computer work  [] (05902) Reviewed/Progressed HEP activities related to improving balance, coordination, kinesthetic sense, posture, motor skill, proprioception of scapular, scapulothoracic and UE control with self care, reaching, carrying, lifting, house/yardwork, driving/computer work      Manual Treatments:  PROM / STM / Oscillations-Mobs:  G-I, II, III, IV (PA's, Inf., Post.)  [x] (98452) Provided manual therapy to mobilize soft tissue/joints of cervical/CT, scapular GHJ and UE for the purpose of modulating pain, promoting relaxation,  increasing ROM, reducing/eliminating soft tissue swelling/inflammation/restriction, improving soft tissue extensibility and allowing for proper ROM for normal function with self care, reaching, carrying, lifting, house/yardwork, driving/computer work    Modalities:  Declined ice/heat  Charges:  Timed Code Treatment Minutes: 45   Total Treatment Minutes: 45       [] EVAL (LOW) 12520 (typically 20 minutes face-to-face)  [] EVAL (MOD) 01289 (typically 30 minutes face-to-face)  [] EVAL (HIGH) 64491 (typically 45 minutes face-to-face)  [] RE-EVAL     [x] BI(63322) x   2  [] IONTO  [] NMR (89261) x     [] VASO  [x] Manual (08306) x  1    [] Other:  [] TA x      [] Mech Traction (71430)  [] ES(attended) (12493)      [] ES (un) (85539):     GOALS:  GOALS:   Patient stated goal: Patient to report improved ability with lifting weights and ADLs, including driving. [x] Progressing: [] Met: [] Not Met: [] Adjusted    Therapist goals for Patient:   Short Term Goals: To be achieved in: 2 weeks  1. Independent in HEP and progression per patient tolerance, in order to prevent re-injury. [x] Progressing: [] Met: [] Not Met: [] Adjusted   2. Patient will have a decrease in pain to facilitate improvement in movement, function, and ADLs as indicated by Functional Deficits. [x] Progressing: [] Met: [] Not Met: [] Adjusted    Long Term Goals: To be achieved in: 6-8 weeks  1. Disability index score of 35% or less for the UEFS to assist with reaching prior level of function. [x] Progressing: [] Met: [] Not Met: [] Adjusted  2. Patient will demonstrate increased AROM equal to L UE to allow for proper joint functioning as indicated by patients Functional Deficits. [x] Progressing: [] Met: [] Not Met: [] Adjusted  3. Patient will demonstrate an increase in strength to good scapular and core control  for UE to allow for proper functional mobility as indicated by patients Functional Deficits. [x] Progressing: [] Met: [] Not Met: [] Adjusted  4. Patient will return to ADLs functional activities without increased symptoms or restriction. [x] Progressing: [] Met: [] Not Met: [] Adjusted  5. Patient to report returning to working out activities with pain 4/10 or less. [x] Progressing: [] Met: [] Not Met: [] Adjusted        Overall Progression Towards Functional goals/ Treatment Progress Update:  [] Patient is progressing as expected towards functional goals listed. [] Progression is slowed due to complexities/Impairments listed.   [] Progression has been slowed due to co-morbidities. [x] Plan just implemented, too soon to assess goals progression <30days   [] Goals require adjustment due to lack of progress  [] Patient is not progressing as expected and requires additional follow up with physician  [] Other    Prognosis for POC: [x] Good [] Fair  [] Poor      Patient requires continued skilled intervention: [x] Yes  [] No    Treatment/Activity Tolerance:  [x] Patient able to complete treatment  [] Patient limited by fatigue  [] Patient limited by pain     [] Patient limited by other medical complications  [x] Other: Patient reports feeling better following PT tx, but doesn't last.  Still held on ER and elevation activities secondary to pain. AROM to end range with R UE with no pain following PT tx. PLAN: See eval  [] Continue per plan of care [] Alter current plan (see comments above)  [x] Plan of care initiated [] Hold pending MD visit [] Discharge  Plan next visit:  Monitor symptoms with potential elevation activities  - Scaption    Electronically signed by:  Tamiko Crandall, PT, MPT    Note: If patient does not return for scheduled/ recommended follow up visits, this note will serve as a discharge from care along with most recent update on progress.

## 2022-03-29 ENCOUNTER — HOSPITAL ENCOUNTER (OUTPATIENT)
Dept: PHYSICAL THERAPY | Age: 67
Setting detail: THERAPIES SERIES
Discharge: HOME | End: 2022-03-29
Payer: MEDICARE

## 2022-03-29 PROCEDURE — 97110 THERAPEUTIC EXERCISES: CPT | Performed by: PHYSICAL THERAPIST

## 2022-03-29 PROCEDURE — 97140 MANUAL THERAPY 1/> REGIONS: CPT | Performed by: PHYSICAL THERAPIST

## 2022-03-31 NOTE — FLOWSHEET NOTE
[facility] and [address],  Sports Performance and Rehabilitation, [facility] [address]  793 [facility],5Th Floor   Jose Antonio Rodriguez  Phone: [phone]  Fax: [phone]      Physical Therapy Daily Treatment Note  Date:  3/29/2022  Patient Name:  Jonathan Ledesma    :  1955  MRN: [id number]  Restrictions/Precautions:    Medical/Treatment Diagnosis Information:  ·   Diagnosis: M25.511 (ICD-10-CM) - Right shoulder pain, unspecified chronicity  · M12.811 (ICD-10-CM) - Rotator cuff arthropathy of right shoulder       Insurance/Certification information:     Physician Information:  Dr. Nay Ambrosio  Has the plan of care been signed (Y/N):        []  Yes  [x]  No     Date of Patient follow up with Physician:       Is this a Progress Report:     []  Yes  [x]  No        If Yes:  Date Range for reporting period:  Beginning  Ending    Progress report will be due (10 Rx or 30 days whichever is less):  78    Recertification will be due (POC Duration  / 90 days whichever is less): 22         Visit # Insurance Allowable Auth Required   5 No limit []  Yes []  No        Functional Scale:45% UEFI   Date assessed: 3/8/22     Latex Allergy:  [x]NO      []YES  Preferred Language for Healthcare:   [x]English       []other:    Pain level:  3/10, achy    SUBJECTIVE: \"it's been a little more sore recently\"         OBJECTIVE:   OBJECTIVE:     ROM PROM AROM  Comment    L R L R    Flexion WNL  148    Abduction WNL Pain at 90 165 154    ER WNL WNL      IR        Other  (cervical)        Other             Strength L R Comment   Flexion 4/5 4-/5    Abduction 4/5 4-/5 pain    ER 4+/5 4-/5 pain    IR 4-/5 4-/5    Supraspinatus      Upper Trap      Lower Trap      Mid Trap      Rhomboids      Biceps      Triceps      Horizontal Abduction      Horizontal Adduction      Lats            RESTRICTIONS/PRECAUTIONS: B RTC repair (20 years ago R, 6 years ago L)    Exercises/Interventions:   HEP:  Access Code: [email or web address]  URL: [email or web address]  Date: 03/08/2022  Prepared by: Tracy Ann    Exercises  Seated Scapular Retraction - 1 x daily - 7 x weekly - 3 sets - 10 reps  Isometric Shoulder External Rotation at Wall - 1 x daily - 7 x weekly - 1 sets - 10 reps - 10 hold  Standing Isometric Shoulder Internal Rotation at Doorway - 1 x daily - 7 x weekly - 1 sets - 10 reps - 10 hold  Isometric Shoulder Flexion at Wall - 1 x daily - 7 x weekly - 1 sets - 10 reps - 10 hold    Exercises:  Exercise/Equipment Resistance/Repetitions Other comments   Stretching/PROM     Posterior capsular stretch 3 x 30 sec    Table Slides     UE [place]     Pulleys     Pendulum          Isometrics     Retraction        Weight shift    Flexion    Abduction    External Rotation Skipped secondary to increase in pain   Internal Rotation    Biceps     Triceps          PRE's     Flexion     Abduction     External Rotation     Internal Rotation     Prone Rows 2 x10    EXT     Reverse Flys     Serratus Punch 2 x10 supine   Lawnmower 2 x 10 Standing, 0#   Biceps     Triceps     Retraction         Pulleys      4'    Cable Column/Theraband     External Rotation     Internal Rotation 2 x 10 Yellow tb   Shrugs     Lats     Ext 2 x 10 Yellow tb   Flex     Rows 2 x10 red tb   BIC     TRIC     PNF          Dynamic Stability          Plyoback          Manual interventions Oscillations/ light mobilizations (AP, caudally) x 10 Increased pain with abd at ~90    25'               Therapeutic Exercise and NMR EXR  [x] (52219) Provided verbal/tactile cueing for activities related to strengthening, flexibility, endurance, ROM  for improvements in scapular, scapulothoracic and UE control with self care, reaching, carrying, lifting, house/yardwork, driving/computer work.    [] (48920) Provided verbal/tactile cueing for activities related to improving balance, coordination, kinesthetic sense, posture, motor skill, proprioception  to assist with  scapular, scapulothoracic and UE control with self care, reaching, carrying, lifting, house/yardwork, driving/computer work. Therapeutic Activities:    [] (92845 or 68633) Provided verbal/tactile cueing for activities related to improving balance, coordination, kinesthetic sense, posture, motor skill, proprioception and motor activation to allow for proper function of scapular, scapulothoracic and UE control with self care, carrying, lifting, driving/computer work.      Home Exercise Program:    [x] (97921) Reviewed/Progressed HEP activities related to strengthening, flexibility, endurance, ROM of scapular, scapulothoracic and UE control with self care, reaching, carrying, lifting, house/yardwork, driving/computer work  [] (42126) Reviewed/Progressed HEP activities related to improving balance, coordination, kinesthetic sense, posture, motor skill, proprioception of scapular, scapulothoracic and UE control with self care, reaching, carrying, lifting, house/yardwork, driving/computer work      Manual Treatments:  PROM / STM / Oscillations-Mobs:  G-I, II, III, IV (PA's, Inf., Post.)  [x] (36724) Provided manual therapy to mobilize soft tissue/joints of cervical/CT, scapular GHJ and UE for the purpose of modulating pain, promoting relaxation,  increasing ROM, reducing/eliminating soft tissue swelling/inflammation/restriction, improving soft tissue extensibility and allowing for proper ROM for normal function with self care, reaching, carrying, lifting, house/yardwork, driving/computer work    Modalities:  Declined ice/heat  Charges:  Timed Code Treatment Minutes: 45   Total Treatment Minutes: 45       [] EVAL (LOW) 02753 (typically 20 minutes face-to-face)  [] EVAL (MOD) 58440 (typically 30 minutes face-to-face)  [] EVAL (HIGH) 94088 (typically 45 minutes face-to-face)  [] RE-EVAL     [x] UI(64078) x   1  [] IONTO  [] NMR (01477) x     [] VASO  [x] Manual (36196) x  2    [] Other:  [] TA x      [] [facility] T[facility] (61037)  [] ES(attended) (18075)      [] ES (un) (31989):     GOALS:  GOALS:   Patient stated goal: Patient to report improved ability with lifting weights and ADLs, including driving. [x] Progressing: [] Met: [] Not Met: [] Adjusted    Therapist goals for Patient:   Short Term Goals: To be achieved in: 2 weeks  1. Independent in HEP and progression per patient tolerance, in order to prevent re-injury. [x] Progressing: [] Met: [] Not Met: [] Adjusted   2. Patient will have a decrease in pain to facilitate improvement in movement, function, and ADLs as indicated by Functional Deficits. [x] Progressing: [] Met: [] Not Met: [] Adjusted    Long Term Goals: To be achieved in: 6-8 weeks  1. Disability index score of 35% or less for the UEFS to assist with reaching prior level of function. [x] Progressing: [] Met: [] Not Met: [] Adjusted  2. Patient will demonstrate increased AROM equal to L UE to allow for proper joint functioning as indicated by patients Functional Deficits. [x] Progressing: [] Met: [] Not Met: [] Adjusted  3. Patient will demonstrate an increase in strength to good scapular and core control  for UE to allow for proper functional mobility as indicated by patients Functional Deficits. [x] Progressing: [] Met: [] Not Met: [] Adjusted  4. Patient will return to ADLs functional activities without increased symptoms or restriction. [x] Progressing: [] Met: [] Not Met: [] Adjusted  5. Patient to report returning to working out activities with pain 4/10 or less. [x] Progressing: [] Met: [] Not Met: [] Adjusted        Overall Progression Towards Functional goals/ Treatment Progress Update:  [] Patient is progressing as expected towards functional goals listed. [] Progression is slowed due to complexities/Impairments listed. [] Progression has been slowed due to co-morbidities.   [x] Plan just implemented, too soon to assess goals progression <30days [] Goals require adjustment due to lack of progress  [] Patient is not progressing as expected and requires additional follow up with physician  [] Other    Prognosis for POC: [x] Good [] Fair  [] Poor      Patient requires continued skilled intervention: [x] Yes  [] No    Treatment/Activity Tolerance:  [x] Patient able to complete treatment  [] Patient limited by fatigue  [] Patient limited by pain     [] Patient limited by other medical complications  [] Other: Patient reports feeling better following PT tx, but doesn't last.  Still held on ER and elevation activities secondary to pain. AROM to end range with R UE with no pain following PT tx. PLAN: See eval  [x] Continue per plan of care [] Alter current plan (see comments above)  [] Plan of care initiated [] Hold pending MD visit [] Discharge  Plan next visit:  Monitor symptoms with potential elevation activities  - Scaption    Electronically signed by:  Issa Lim, PT, MPT    Note: If patient does not return for scheduled/ recommended follow up visits, this note will serve as a discharge from care along with most recent update on progress.

## 2022-04-01 ENCOUNTER — HOSPITAL ENCOUNTER (OUTPATIENT)
Dept: PHYSICAL THERAPY | Age: 67
Setting detail: THERAPIES SERIES
Discharge: HOME | End: 2022-04-01
Payer: MEDICARE

## 2022-04-01 ENCOUNTER — APPOINTMENT (OUTPATIENT)
Dept: PHYSICAL THERAPY | Age: 67
End: 2022-04-01
Payer: MEDICARE

## 2022-04-01 PROCEDURE — 97140 MANUAL THERAPY 1/> REGIONS: CPT | Performed by: PHYSICAL THERAPIST

## 2022-04-01 PROCEDURE — 97110 THERAPEUTIC EXERCISES: CPT | Performed by: PHYSICAL THERAPIST

## 2022-04-03 NOTE — FLOWSHEET NOTE
[facility] [address],  Sports Performance and Rehabilitation, [facility] 6199 [address]  793 [facility],5Th Floor   [facility], [address]  Phone: [phone]  Fax: [phone]      Physical Therapy Daily Treatment Note  Date:  2022  Patient Name:  Kt Pagan    :  1955  MRN: [id number]  Restrictions/Precautions:    Medical/Treatment Diagnosis Information:  ·   Diagnosis: M25.511 (ICD-10-CM) - Right shoulder pain, unspecified chronicity  · M12.811 (ICD-10-CM) - Rotator cuff arthropathy of right shoulder       Insurance/Certification information:     Physician Information:  Dr. Joe Casey  Has the plan of care been signed (Y/N):        []  Yes  [x]  No     Date of Patient follow up with Physician:       Is this a Progress Report:     []  Yes  [x]  No        If Yes:  Date Range for reporting period:  Beginning  Ending    Progress report will be due (10 Rx or 30 days whichever is less):  03    Recertification will be due (POC Duration  / 90 days whichever is less): 22         Visit # Insurance Allowable Auth Required   6 No limit []  Yes []  No        Functional Scale:45% UEFI   Date assessed: 3/8/22     Latex Allergy:  [x]NO      []YES  Preferred Language for Healthcare:   [x]English       []other:    Pain level:  3/10, achy    SUBJECTIVE: \"it's been a little more sore recently\"         OBJECTIVE:   OBJECTIVE:     ROM PROM AROM  Comment    L R L R    Flexion WNL  148    Abduction WNL Pain at 90 165 154    ER WNL WNL      IR        Other  (cervical)        Other             Strength L R Comment   Flexion 4/5 4-/5    Abduction 4/5 4-/5 pain    ER 4+/5 4-/5 pain    IR 4-/5 4-/5    Supraspinatus      Upper Trap      Lower Trap      Mid Trap      Rhomboids      Biceps      Triceps      Horizontal Abduction      Horizontal Adduction      Lats            RESTRICTIONS/PRECAUTIONS: B RTC repair (20 years ago R, 6 years ago L)    Exercises/Interventions:   HEP:  Access Code: [email or web address]  URL: [email or web address]  Date: 03/08/2022  Prepared by: Dima Lemus    Exercises  Seated Scapular Retraction - 1 x daily - 7 x weekly - 3 sets - 10 reps  Isometric Shoulder External Rotation at Wall - 1 x daily - 7 x weekly - 1 sets - 10 reps - 10 hold  Standing Isometric Shoulder Internal Rotation at Doorway - 1 x daily - 7 x weekly - 1 sets - 10 reps - 10 hold  Isometric Shoulder Flexion at Wall - 1 x daily - 7 x weekly - 1 sets - 10 reps - 10 hold    Exercises:  Exercise/Equipment Resistance/Repetitions Other comments   Stretching/PROM     Posterior capsular stretch 3 x 30 sec    Table Slides     UE [place]     Pulleys     Pendulum          Isometrics     Retraction        Weight shift    Flexion    Abduction    External Rotation Skipped secondary to increase in pain   Internal Rotation    Biceps     Triceps          PRE's     Flexion     Abduction     External Rotation     Internal Rotation     Prone Rows 2 x10    EXT     Reverse Flys     Serratus Punch 2 x10 supine   Lawnmower 2 x 10 Standing, 0#   Biceps     Triceps     Retraction         Pulleys      4'    Cable Column/Theraband     External Rotation     Internal Rotation 2 x 10 Yellow tb   Shrugs     Lats     Ext 2 x 10 Yellow tb   Flex     Rows 2 x10 red tb   BIC     TRIC     PNF          Dynamic Stability          Plyoback          Manual interventions Oscillations/ light mobilizations (AP, caudally) x 10 Increased pain with abd at ~90    25'               Therapeutic Exercise and NMR EXR  [x] (75418) Provided verbal/tactile cueing for activities related to strengthening, flexibility, endurance, ROM  for improvements in scapular, scapulothoracic and UE control with self care, reaching, carrying, lifting, house/yardwork, driving/computer work.    [] (21147) Provided verbal/tactile cueing for activities related to improving balance, coordination, kinesthetic sense, posture, motor skill, proprioception  to assist with  scapular, scapulothoracic and UE control with self care, reaching, carrying, lifting, house/yardwork, driving/computer work. Therapeutic Activities:    [] (17216 or 98898) Provided verbal/tactile cueing for activities related to improving balance, coordination, kinesthetic sense, posture, motor skill, proprioception and motor activation to allow for proper function of scapular, scapulothoracic and UE control with self care, carrying, lifting, driving/computer work.      Home Exercise Program:    [x] (95008) Reviewed/Progressed HEP activities related to strengthening, flexibility, endurance, ROM of scapular, scapulothoracic and UE control with self care, reaching, carrying, lifting, house/yardwork, driving/computer work  [] (32499) Reviewed/Progressed HEP activities related to improving balance, coordination, kinesthetic sense, posture, motor skill, proprioception of scapular, scapulothoracic and UE control with self care, reaching, carrying, lifting, house/yardwork, driving/computer work      Manual Treatments:  PROM / STM / Oscillations-Mobs:  G-I, II, III, IV (PA's, Inf., Post.)  [x] (66818) Provided manual therapy to mobilize soft tissue/joints of cervical/CT, scapular GHJ and UE for the purpose of modulating pain, promoting relaxation,  increasing ROM, reducing/eliminating soft tissue swelling/inflammation/restriction, improving soft tissue extensibility and allowing for proper ROM for normal function with self care, reaching, carrying, lifting, house/yardwork, driving/computer work    Modalities:  Declined ice/heat  Charges:  Timed Code Treatment Minutes: 45   Total Treatment Minutes: 45       [] EVAL (LOW) 45174 (typically 20 minutes face-to-face)  [] EVAL (MOD) 15621 (typically 30 minutes face-to-face)  [] EVAL (HIGH) 51021 (typically 45 minutes face-to-face)  [] RE-EVAL     [x] DO(71215) x   1  [] IONTO  [] NMR (50291) x     [] VASO  [x] Manual (29193) x  2    [] Other:  [] TA x      [] [facility] Traction (35902)  [] ES(attended) (81761)      [] ES (un) (49920):     GOALS:  GOALS:   Patient stated goal: Patient to report improved ability with lifting weights and ADLs, including driving. [x] Progressing: [] Met: [] Not Met: [] Adjusted    Therapist goals for Patient:   Short Term Goals: To be achieved in: 2 weeks  1. Independent in HEP and progression per patient tolerance, in order to prevent re-injury. [x] Progressing: [] Met: [] Not Met: [] Adjusted   2. Patient will have a decrease in pain to facilitate improvement in movement, function, and ADLs as indicated by Functional Deficits. [x] Progressing: [] Met: [] Not Met: [] Adjusted    Long Term Goals: To be achieved in: 6-8 weeks  1. Disability index score of 35% or less for the UEFS to assist with reaching prior level of function. [x] Progressing: [] Met: [] Not Met: [] Adjusted  2. Patient will demonstrate increased AROM equal to L UE to allow for proper joint functioning as indicated by patients Functional Deficits. [x] Progressing: [] Met: [] Not Met: [] Adjusted  3. Patient will demonstrate an increase in strength to good scapular and core control  for UE to allow for proper functional mobility as indicated by patients Functional Deficits. [x] Progressing: [] Met: [] Not Met: [] Adjusted  4. Patient will return to ADLs functional activities without increased symptoms or restriction. [x] Progressing: [] Met: [] Not Met: [] Adjusted  5. Patient to report returning to working out activities with pain 4/10 or less. [x] Progressing: [] Met: [] Not Met: [] Adjusted        Overall Progression Towards Functional goals/ Treatment Progress Update:  [] Patient is progressing as expected towards functional goals listed. [] Progression is slowed due to complexities/Impairments listed. [] Progression has been slowed due to co-morbidities.   [x] Plan just implemented, too soon to assess goals progression <30days [] Goals require adjustment due to lack of progress  [] Patient is not progressing as expected and requires additional follow up with physician  [] Other    Prognosis for POC: [x] Good [] Fair  [] Poor      Patient requires continued skilled intervention: [x] Yes  [] No    Treatment/Activity Tolerance:  [x] Patient able to complete treatment  [] Patient limited by fatigue  [] Patient limited by pain     [] Patient limited by other medical complications  [] Other: Patient reports feeling better following PT tx, but doesn't last.  Still held on ER and elevation activities secondary to pain. AROM to end range with R UE with no pain following PT tx. PLAN: See eval  [x] Continue per plan of care [] Alter current plan (see comments above)  [] Plan of care initiated [] Hold pending MD visit [] Discharge  Plan next visit:  Monitor symptoms with potential elevation activities  - Scaption    Electronically signed by:  Edie Aguila, PT, MPT    Note: If patient does not return for scheduled/ recommended follow up visits, this note will serve as a discharge from care along with most recent update on progress.

## 2022-04-05 ENCOUNTER — HOSPITAL ENCOUNTER (OUTPATIENT)
Dept: PHYSICAL THERAPY | Age: 67
Setting detail: THERAPIES SERIES
Discharge: HOME | End: 2022-04-05
Payer: MEDICARE

## 2022-04-05 PROCEDURE — 97140 MANUAL THERAPY 1/> REGIONS: CPT | Performed by: PHYSICAL THERAPIST

## 2022-04-06 ENCOUNTER — TELEPHONE (OUTPATIENT)
Dept: ORTHOPEDIC SURGERY | Age: 67
End: 2022-04-06

## 2022-04-06 ENCOUNTER — OFFICE VISIT (OUTPATIENT)
Dept: ORTHOPEDIC SURGERY | Age: 67
End: 2022-04-06
Payer: MEDICARE

## 2022-04-06 VITALS — HEIGHT: 63 IN | BODY MASS INDEX: 19.49 KG/M2 | WEIGHT: 110 LBS

## 2022-04-06 DIAGNOSIS — M12.811 ROTATOR CUFF ARTHROPATHY OF RIGHT SHOULDER: Primary | ICD-10-CM

## 2022-04-06 PROCEDURE — 1090F PRES/ABSN URINE INCON ASSESS: CPT | Performed by: ORTHOPAEDIC SURGERY

## 2022-04-06 PROCEDURE — 3017F COLORECTAL CA SCREEN DOC REV: CPT | Performed by: ORTHOPAEDIC SURGERY

## 2022-04-06 PROCEDURE — G8427 DOCREV CUR MEDS BY ELIG CLIN: HCPCS | Performed by: ORTHOPAEDIC SURGERY

## 2022-04-06 PROCEDURE — 1123F ACP DISCUSS/DSCN MKR DOCD: CPT | Performed by: ORTHOPAEDIC SURGERY

## 2022-04-06 PROCEDURE — G8400 PT W/DXA NO RESULTS DOC: HCPCS | Performed by: ORTHOPAEDIC SURGERY

## 2022-04-06 PROCEDURE — 1036F TOBACCO NON-USER: CPT | Performed by: ORTHOPAEDIC SURGERY

## 2022-04-06 PROCEDURE — 99214 OFFICE O/P EST MOD 30 MIN: CPT | Performed by: ORTHOPAEDIC SURGERY

## 2022-04-06 PROCEDURE — 4040F PNEUMOC VAC/ADMIN/RCVD: CPT | Performed by: ORTHOPAEDIC SURGERY

## 2022-04-06 PROCEDURE — G8420 CALC BMI NORM PARAMETERS: HCPCS | Performed by: ORTHOPAEDIC SURGERY

## 2022-04-06 RX ORDER — ESCITALOPRAM OXALATE 20 MG/1
20 TABLET ORAL DAILY
COMMUNITY
Start: 2022-01-03

## 2022-04-06 RX ORDER — TRAMADOL HYDROCHLORIDE 50 MG/1
50 TABLET ORAL EVERY 6 HOURS PRN
COMMUNITY
Start: 2022-03-07 | End: 2022-09-18

## 2022-04-06 NOTE — TELEPHONE ENCOUNTER
Surgery and/or Procedure Scheduling     Wants to move surgery from the 20th to the 13th.     Contact Name:McInerney, Randolph Hammans   Surgical/Procedure Request: Shoulder  Patient Contact Number: [phone]

## 2022-04-06 NOTE — PROGRESS NOTES
Chief Complaint    Shoulder Pain (F/U RIGHT SHOULDER)      History of Present Illness:    Jc Castanon is a pleasant, 74 y.o. female here today for follow-up of her right shoulder, patient was seen few weeks back, was diagnosed with a massive rotator cuff tear and possible early cuff tear arthropathy, we started the nonoperative protocol with corticosteroid injection and physical therapy, anti-inflammatory medication. Patient states that she did not have any improvement with all of above-mentioned measurements. She is here to discuss other options. She has mild pain in the morning, which will get worse during the day, pain is severe at the end of the day. She continues to have good range of motion. Note from last visit:  Patient is here on referral from her sister for evaluation of right shoulder pain. The patient states that she was in [place] a few weeks ago and was doing upper body workout when she started to have right shoulder pain. The pain is worse with overhead activities. She has been taking over the counter anti inflammatory without much relief. The pain wakes her up at night. The patient denies numbness or tingling. The patient has a history of open rotator cuff repair, right by Dr. Desi Johnson around 20 years ago. .        Medical History:    No notes on file    Patient's medications, allergies, past medical, surgical, social and family histories were reviewed and updated as appropriate. Past Medical History:   Diagnosis Date    Anxiety     Cancer [facility])     COLON    Osteoarthritis       Social History     Socioeconomic History    Marital status:       Spouse name: Not on file    Number of children: Not on file    Years of education: Not on file    Highest education level: Not on file   Occupational History    Not on file   Tobacco Use    Smoking status: Former Smoker     Packs/day: 1.50     Years: 15.00     Pack years: 22.50     Quit date: 1/1/2001     Years since quittin.2    Smokeless tobacco: Never Used   Substance and Sexual Activity    Alcohol use: No    Drug use: No    Sexual activity: Not on file   Other Topics Concern    Not on file   Social History Narrative    Not on file     Social Determinants of Health     Financial Resource Strain:     Difficulty of Paying Living Expenses: Not on file   Food Insecurity:     Worried About Running Out of Food in the Last Year: Not on file    Ara of Food in the Last Year: Not on file   Transportation Needs:     Lack of Transportation (Medical): Not on file    Lack of Transportation (Non-Medical):  Not on file   Physical Activity:     Days of Exercise per Week: Not on file    Minutes of Exercise per Session: Not on file   Stress:     Feeling of Stress : Not on file   Social Connections:     Frequency of Communication with Friends and Family: Not on file    Frequency of Social Gatherings with Friends and Family: Not on file    Attends Muslim Services: Not on file    Active Member of [address] [place] [address] or Organizations: Not on file    Attends Club or Organization Meetings: Not on file    Marital Status: Not on file   Intimate Partner Violence:     Fear of Current or Ex-Partner: Not on file    Emotionally Abused: Not on file    Physically Abused: Not on file    Sexually Abused: Not on file   Housing Stability:     Unable to Pay for Housing in the Last Year: Not on file    Number of [place] in the Last Year: Not on file    Unstable Housing in the Last Year: Not on file       Allergies   Allergen Reactions    Sulfa Antibiotics Rash     High temp     Current Outpatient Medications on File Prior to Visit   Medication Sig Dispense Refill    traMADol (ULTRAM) 50 MG tablet       escitalopram (LEXAPRO) 20 MG tablet       meloxicam (MOBIC) 15 MG tablet Take 1 tablet by mouth daily 30 tablet 5    Multiple Vitamins-Minerals (THERAPEUTIC MULTIVITAMIN-MINERALS) tablet Take 1 tablet by mouth daily       No current facility-administered medications on file prior to visit. Review of Systems  A 14 point review of systems was completed by the patient on 4/6/2022 and is available in the media section of the scanned medical record and was reviewed on 4/6/2022. The review is negative with the exception of those things mentioned in the HPI and Past Medical History    Vital Signs: There were no vitals filed for this visit. General Exam:   Constitutional: Patient is adequately groomed with no evidence of malnutrition      right Shoulder Examination:    Inspection:  No skin lesions, no obvious deformity, no swelling. Palpation:  Tenderness over anterior shoulder joint, Non-tender to palpate [facility] joint    Active Range of Motion: Forward Elevation 130, Abduction 150, External Rotation 40, Internal Rotation T12    Passive Range of Motion: Forward Elevation 150, Abduction 150, External Rotation 40, Internal Rotation T12    Strength:  External Rotation 4/5, Internal Rotation 4/5, Champagne Toast 4/5, Supraspinatus 4/5    Special Tests:  negativ Jud's, negative Hawkin's, No Phillip deformity. Neurovascular: Palpable radial pulse, normal sensation in the median, ulnar, radial nerve distributions        Comparison left Shoulder Examination:    Inspection:  No skin lesions, no deformity, no swelling. Palpation: no Tenderness    Active Range of Motion: Forward Elevation 150, Abduction 150, External Rotation 40, Internal Rotation T12    Passive Range of Motion: Forward Elevation 150, Abduction 150, External Rotation 40, Internal Rotation T12    Strength:  External Rotation 4+/5, Internal Rotation 5/5, Champagne Toast 4+/5, Supraspinatus 4+/5    Special Tests:  No Phillip Deformity    Neurovascular:  Palpable radial pulse, normal sensation in the median, ulnar, radial nerve distributions      Self assessment questionnaires were completed today. Radiology:     No x-rays were done today.           Assessment :  Randolph Hammans Eduin Aldridge is a pleasant 74 y.o. female with pain related to right shoulder rotator cuff arthropathy. She has not responded well to  corticosteroid injection and to a trial of physical therapy. Other treatment options need to be considered. Impression:  Encounter Diagnosis   Name Primary?  Rotator cuff arthropathy of right shoulder Yes       Office Procedures:  Orders Placed This Encounter   Procedures    MRI SHOULDER RIGHT WO CONTRAST     Standing Status:   Future     Standing Expiration Date:   4/6/2023     Order Specific Question:   Reason for exam:     Answer:   Right shoulder pain     Order Specific Question:   Reason for exam:     Answer:   Isabella Sanabria       Treatment Plan:  Pertinent imaging and examination findings were reviewed with Wing Camargo. The etiology, natural history, and treatment options for the disorder were discussed. The roles of activity modifications, medications, cryotherapy and heat, injections, physical therapy, and surgical interventions were all described to the patient and questions were answered. Thorough discussion was done with the patient today, we believe that the patient has rotator cuff arthropathy, fortunately she  continues to have an excellent range of motion. We  discussed operative versus nonoperative treatment, unfortunately patient failed nonoperative treatment with steroid injection and physical therapy. Operative treatment options include subacromial decompression and balloon insertion versus reverse shoulder replacement. We  discussed into details both options. At this point we believe that the patient is a good candidate for right shoulder subacromial balloon insertion, we discussed that the surgery will not improve her strength but will decrease her pain. Patient wishes to proceed with subacromial balloon insertion, preoperative assessment will include right shoulder MRI to further examine the rotator cuff and the glenohumeral joint.     Jose F Parker Eusebia Loera will follow up after MRI and/or as needed. They were in agreement with this plan and all questions were answered to the patient's satisfaction. They were encouraged to call with any questions. Greater than 30 minutes were expended on all aspects of today's visit. 10:24 AM  4/6/2022    Eli Stout MD  [facility] Clinical fellow    During this examination, Deena Arteaga MD, functioned as a scribe for Dr. Priscilla Somers. The history taking and physical examination were performed by Dr. Jeevan Montanez. All counseling during the appointment was performed between the patient and Dr. Jeevan Montanez.   ______________________  I was physically present and personally supervised the Orthopaedic Sports Medicine Fellow in the evaluation and development of a treatment plan for this patient. I personally interviewed the patient and performed a physical examination. In addition, I discussed the patient's condition and treatment options with them. I have also reviewed and agree with the past medical, family and social history unless otherwise noted. All of the patient's questions were answered. Corrie Montanez MD, PhD  4/6/2022

## 2022-04-08 ENCOUNTER — TELEPHONE (OUTPATIENT)
Dept: ORTHOPEDIC SURGERY | Age: 67
End: 2022-04-08

## 2022-04-09 NOTE — FLOWSHEET NOTE
[facility] and [address],  Sports Performance and Rehabilitation, [facility] [address]  [address] [facility],5[address] Floor   Jose Antonio Rodriguez  Phone: [phone]  Fax: [phone]      Physical Therapy Daily Treatment Note  Date:  2022  Patient Name:  Kathleen Daily    :  1955  MRN: [id number]  Restrictions/Precautions:    Medical/Treatment Diagnosis Information:  ·   Diagnosis: M25.511 (ICD-10-CM) - Right shoulder pain, unspecified chronicity  · M12.811 (ICD-10-CM) - Rotator cuff arthropathy of right shoulder       Insurance/Certification information:     Physician Information:  Dr. Sammi Dumont  Has the plan of care been signed (Y/N):        []  Yes  [x]  No     Date of Patient follow up with Physician:       Is this a Progress Report:     []  Yes  [x]  No        If Yes:  Date Range for reporting period:  Beginning  Ending    Progress report will be due (10 Rx or 30 days whichever is less):  55    Recertification will be due (POC Duration  / 90 days whichever is less): 22         Visit # Insurance Allowable Auth Required   7 No limit []  Yes []  No        Functional Scale:45% UEFI   Date assessed: 3/8/22     Latex Allergy:  [x]NO      []YES  Preferred Language for Healthcare:   [x]English       []other:    Pain level:  3/10, achy    SUBJECTIVE: \"just kind of not feeling better no matter what. Jeromy Luis \"          OBJECTIVE:   OBJECTIVE:     ROM PROM AROM  Comment    L R L R    Flexion WNL  148    Abduction WNL Pain at 90 165 154    ER WNL WNL      IR        Other  (cervical)        Other             Strength L R Comment   Flexion 4/5 4-/5    Abduction 4/5 4-/5 pain    ER 4+/5 4-/5 pain    IR 4-/5 4-/5    Supraspinatus      Upper Trap      Lower Trap      Mid Trap      Rhomboids      Biceps      Triceps      Horizontal Abduction      Horizontal Adduction      Lats            RESTRICTIONS/PRECAUTIONS: B RTC repair (20 years ago R, 6 years ago L)    Exercises/Interventions:   HEP:  Access Code: [email or web address]  URL: [email or web address]  Date: 03/08/2022  Prepared by: Tian Luu    Exercises  Seated Scapular Retraction - 1 x daily - 7 x weekly - 3 sets - 10 reps  Isometric Shoulder External Rotation at Wall - 1 x daily - 7 x weekly - 1 sets - 10 reps - 10 hold  Standing Isometric Shoulder Internal Rotation at Doorway - 1 x daily - 7 x weekly - 1 sets - 10 reps - 10 hold  Isometric Shoulder Flexion at Wall - 1 x daily - 7 x weekly - 1 sets - 10 reps - 10 hold    Exercises:  Exercise/Equipment Resistance/Repetitions Other comments   Stretching/PROM     Posterior capsular stretch 3 x 30 sec    Table Slides     UE [place]     Pulleys     Pendulum          Isometrics     Retraction        Weight shift    Flexion    Abduction    External Rotation Skipped secondary to increase in pain   Internal Rotation    Biceps     Triceps          PRE's     Flexion     Abduction     External Rotation     Internal Rotation     Prone Rows 2 x10    EXT     Reverse Flys     Serratus Punch 2 x10 supine   Lawnmower 2 x 10 Standing, 0#   Biceps     Triceps     Retraction         Pulleys      4'    Cable Column/Theraband     External Rotation     Internal Rotation 2 x 10 Yellow tb   Shrugs     Lats     Ext 2 x 10 Yellow tb   Flex     Rows 2 x10 red tb   BIC     TRIC     PNF          Dynamic Stability          [place]ack          Manual interventions Oscillations/ light mobilizations (AP, caudally) x 10 Increased pain with abd at ~90    25'               Therapeutic Exercise and NMR EXR  [x] (68209) Provided verbal/tactile cueing for activities related to strengthening, flexibility, endurance, ROM  for improvements in scapular, scapulothoracic and UE control with self care, reaching, carrying, lifting, house/yardwork, driving/computer work.    [] (91165) Provided verbal/tactile cueing for activities related to improving balance, coordination, kinesthetic sense, posture, motor skill, proprioception  to assist with  scapular, scapulothoracic and UE control with self care, reaching, carrying, lifting, house/yardwork, driving/computer work. Therapeutic Activities:    [] (23498 or 14700) Provided verbal/tactile cueing for activities related to improving balance, coordination, kinesthetic sense, posture, motor skill, proprioception and motor activation to allow for proper function of scapular, scapulothoracic and UE control with self care, carrying, lifting, driving/computer work.      Home Exercise Program:    [x] (44337) Reviewed/Progressed HEP activities related to strengthening, flexibility, endurance, ROM of scapular, scapulothoracic and UE control with self care, reaching, carrying, lifting, house/yardwork, driving/computer work  [] (08018) Reviewed/Progressed HEP activities related to improving balance, coordination, kinesthetic sense, posture, motor skill, proprioception of scapular, scapulothoracic and UE control with self care, reaching, carrying, lifting, house/yardwork, driving/computer work      Manual Treatments:  PROM / STM / Oscillations-Mobs:  G-I, II, III, IV (PA's, Inf., Post.)  [x] (84881) Provided manual therapy to mobilize soft tissue/joints of cervical/CT, scapular GHJ and UE for the purpose of modulating pain, promoting relaxation,  increasing ROM, reducing/eliminating soft tissue swelling/inflammation/restriction, improving soft tissue extensibility and allowing for proper ROM for normal function with self care, reaching, carrying, lifting, house/yardwork, driving/computer work    Modalities:  Declined ice/heat  Charges:  Timed Code Treatment Minutes: 35   Total Treatment Minutes: 35       [] EVAL (LOW) 49987 (typically 20 minutes face-to-face)  [] EVAL (MOD) 75428 (typically 30 minutes face-to-face)  [] EVAL (HIGH) 89026 (typically 45 minutes face-to-face)  [] RE-EVAL     [] BH(22415)   [] IONTO  [] NMR (59525) x     [] VASO  [x] Manual (59809) x  2    [] Other:  [] TA x      [] [facility] Traction (16148)  [] ES(attended) (85276)      [] ES (un) (77009):     GOALS:  GOALS:   Patient stated goal: Patient to report improved ability with lifting weights and ADLs, including driving. [x] Progressing: [] Met: [] Not Met: [] Adjusted    Therapist goals for Patient:   Short Term Goals: To be achieved in: 2 weeks  1. Independent in HEP and progression per patient tolerance, in order to prevent re-injury. [x] Progressing: [] Met: [] Not Met: [] Adjusted   2. Patient will have a decrease in pain to facilitate improvement in movement, function, and ADLs as indicated by Functional Deficits. [x] Progressing: [] Met: [] Not Met: [] Adjusted    Long Term Goals: To be achieved in: 6-8 weeks  1. Disability index score of 35% or less for the UEFS to assist with reaching prior level of function. [x] Progressing: [] Met: [] Not Met: [] Adjusted  2. Patient will demonstrate increased AROM equal to L UE to allow for proper joint functioning as indicated by patients Functional Deficits. [x] Progressing: [] Met: [] Not Met: [] Adjusted  3. Patient will demonstrate an increase in strength to good scapular and core control  for UE to allow for proper functional mobility as indicated by patients Functional Deficits. [x] Progressing: [] Met: [] Not Met: [] Adjusted  4. Patient will return to ADLs functional activities without increased symptoms or restriction. [x] Progressing: [] Met: [] Not Met: [] Adjusted  5. Patient to report returning to working out activities with pain 4/10 or less. [x] Progressing: [] Met: [] Not Met: [] Adjusted        Overall Progression Towards Functional goals/ Treatment Progress Update:  [] Patient is progressing as expected towards functional goals listed. [] Progression is slowed due to complexities/Impairments listed. [] Progression has been slowed due to co-morbidities.   [x] Plan just implemented, too soon to assess goals progression <30days   [] Goals require adjustment due to lack of progress  [] Patient is not progressing as expected and requires additional follow up with physician  [] Other    Prognosis for POC: [x] Good [] Fair  [] Poor      Patient requires continued skilled intervention: [x] Yes  [] No    Treatment/Activity Tolerance:  [x] Patient able to complete treatment  [] Patient limited by fatigue  [] Patient limited by pain     [] Patient limited by other medical complications  [] Other: Patient reports feeling better following PT tx, but doesn't last.  Still held on ER and elevation activities secondary to pain. AROM to end range with R UE with no pain following PT tx. PLAN: See eval  [x] Continue per plan of care [] Alter current plan (see comments above)  [] Plan of care initiated [] Hold pending MD visit [] Discharge  Plan next visit:  Monitor symptoms with potential elevation activities  - Scaption    Electronically signed by:  Jhon Humphrey, PT, MPT    Note: If patient does not return for scheduled/ recommended follow up visits, this note will serve as a discharge from care along with most recent update on progress.

## 2022-05-04 ENCOUNTER — OFFICE VISIT (OUTPATIENT)
Dept: ORTHOPEDIC SURGERY | Age: 67
End: 2022-05-04
Payer: MEDICARE

## 2022-05-04 VITALS — BODY MASS INDEX: 19.49 KG/M2 | HEIGHT: 63 IN | WEIGHT: 110 LBS

## 2022-05-04 DIAGNOSIS — M12.811 ROTATOR CUFF ARTHROPATHY OF RIGHT SHOULDER: Primary | ICD-10-CM

## 2022-05-04 PROBLEM — K59.00 CONSTIPATION: Status: ACTIVE | Noted: 2017-02-14

## 2022-05-04 PROCEDURE — 1036F TOBACCO NON-USER: CPT | Performed by: ORTHOPAEDIC SURGERY

## 2022-05-04 PROCEDURE — L3670 SO ACRO/CLAV CAN WEB PRE OTS: HCPCS | Performed by: ORTHOPAEDIC SURGERY

## 2022-05-04 PROCEDURE — 4040F PNEUMOC VAC/ADMIN/RCVD: CPT | Performed by: ORTHOPAEDIC SURGERY

## 2022-05-04 PROCEDURE — 1090F PRES/ABSN URINE INCON ASSESS: CPT | Performed by: ORTHOPAEDIC SURGERY

## 2022-05-04 PROCEDURE — G8400 PT W/DXA NO RESULTS DOC: HCPCS | Performed by: ORTHOPAEDIC SURGERY

## 2022-05-04 PROCEDURE — 99214 OFFICE O/P EST MOD 30 MIN: CPT | Performed by: ORTHOPAEDIC SURGERY

## 2022-05-04 PROCEDURE — G8427 DOCREV CUR MEDS BY ELIG CLIN: HCPCS | Performed by: ORTHOPAEDIC SURGERY

## 2022-05-04 PROCEDURE — 1123F ACP DISCUSS/DSCN MKR DOCD: CPT | Performed by: ORTHOPAEDIC SURGERY

## 2022-05-04 PROCEDURE — G8420 CALC BMI NORM PARAMETERS: HCPCS | Performed by: ORTHOPAEDIC SURGERY

## 2022-05-04 PROCEDURE — 3017F COLORECTAL CA SCREEN DOC REV: CPT | Performed by: ORTHOPAEDIC SURGERY

## 2022-05-04 NOTE — PROGRESS NOTES
12 [facility]  History and Physical  Shoulder Pain    Date:  2022    Name:  Dg Coronel  Address:  [address]    :  1955      Age:   74 y.o.    SSN:  [id number]      Medical Record Number:  [id number]    Reason for Visit:    Shoulder Pain (PRE OP RIGHT SHOULDER MRI)      HPI:   Dg Coronel is a 74 y.o. female who presents to our office today for follow up of the right shoulder pain. Patient was seen few weeks back and was diagnosed with a massive rotator cuff tear and possible early cuff tear arthropathy. She was started on nonoperative protocol with corticosteroid injection and physical therapy and anti-inflammatory medication however they were too effective. Patient was asked to get an updated MRI and present today to review the results. She continues to have well preserved motion but has pain and weakness associated with lifting and carrying. Pain Assessment  Location of Pain: Shoulder  Location Modifiers: Right  Severity of Pain: 6  Quality of Pain: Aching  Duration of Pain: Persistent  Frequency of Pain: Constant  Aggravating Factors: Other (Comment)  Limiting Behavior: Yes  Relieving Factors: Rest,Other (Comment)  Work-Related Injury: No  Are there other pain locations you wish to document?: No    No notes on file    Review of Systems:  A 14 point review of systems available in the scanned medical record as documented by the patient. The review is negative with the exception of those things mentioned in the History of Present Illness and Past Medical History. Past Medical History:  Patient's medications, allergies, past medical, surgical, social and family histories were reviewed and updated as appropriate. Allergies: Allergies   Allergen Reactions    Sulfa Antibiotics Rash     High temp       Physical Exam:  There were no vitals filed for this visit.   General: Dg Coronel is a healthy and well appearing 74 y.o. female who is sitting comfortably in our office in acute distress. Skin:  There are no skin lesions, cellulitis, or extreme edema. The patient has warm and well-perfused Bilateral upper extremities with brisk capillary refill. Eyes: Extra-ocular muscles intact  Mouth: Oral mucosa moist. No perioral lesions  Pulm: Respirations unlabored and regular. Neuro: Alert and oriented x3    Right Shoulder Exam:  Inspection:  No gross deformities, no signs of infection. Palpation:  She has glenohumeral and subacromial crepitus, she has tenderness over the rotator cuff footprint  Active Range of Motion: Forward elevation of 150, abduction of , external rotation with elbow at the side 40, internal rotation to the back is T12    Passive Range of Motion: deferred    Strength: External rotation with resistance with elbow at the side 4/5, internal rotation with resistance with elbow at the side +4/5, supraspinatus testing -4/5 champagne toast test    Special Tests:  Negative Garrison, negative bear hug, positive bear hug, No Phillip muscle deformity. Neurovascular: Sensation to light touch is intact, no motor deficits, palpable radial pulses 2+    Additional Examinations:    Examination of the contralateral extremity does not show any tenderness, deformity or injury. Range of motion is unremarkable. There is no gross instability. There are no rashes, ulcerations or lesions.  Strength and tone are normal.      Radiographic:  MRI 4/14/2022 right shoulder     FINDINGS:  Distal clavicle resected.  Subacromial decompression.  Cuff repair.  Full thickness    tear infraspinatus insertion and posterior fibers of the repaired supraspinatus insertion.  In    aggregate the tear 1.8cm.  Fibers minimally retracted.  Moderate bursitis.  Capsulitis.  Mild    muscle fatty atrophy.  Tendinopathic subscapularis insertion.  Chronic partial thickness    interstitial and articular sided tearing or fraying.     Chronically torn biceps long head tendon.       Glenohumeral arthropathy.  Worn labrum.       CONCLUSION:   1. Full thickness tear infraspinatus insertion. Posterior fibers of repaired supraspinatus    insertion torn. Fibers minimally retracted. Moderate bursitis. Mild muscle fatty atrophy. 2. Chronic partial thickness interstitial and articular sided tear subscapularis insertion. 3. Chronically torn biceps long head tendon. Tenodesis felt less likely. 4. Outlet decompression. 5. Glenohumeral arthropathy. Assessment:  Jaime Siegel is a 74 y.o. female with a chronic irreparable rotator cuff tear with mild underlying rotator cuff arthropathy. Impression:  Encounter Diagnosis   Name Primary?  Rotator cuff arthropathy of right shoulder Yes       Office Procedures:  No orders of the defined types were placed in this encounter. Plan: We were able to review the MRI with Jaime Siegel today. She has a healthy rotator cuff muscle with well preserved has good movement. At this point we are recommending that she have a right shoulder arthroscopy for possible rotator cuff repair versus a balloon arthroplasty. We feel at this point she is not a candidate for a reverse total shoulder arthroplasty. Risks, benefits and potential complications of arthroscopic shoulder surgery were discussed with the patient. Risks discussed include but are not limited to bleeding, infection, anesthetic risk, injury to nerves and blood vessels, deep vein thrombosis, residual stiffness and weakness, and the need for revision surgery. The patient also understands that anesthetic risks include cardiopulmonary issues, drug reactions and even death. The patient voices an understanding of the importance of physical therapy and home exercises after surgery. All questions were answered and written informed consent for surgery was obtained today.        5/4/2022  10:34 AM      Payton Dial PA-C  Orthopaedic Sports Medicine Physician Assistant    During this examination, Vinod Allen PA-C, functioned as a scribe for Dr. Buckley Ormond. This dictation was performed with a verbal recognition program (DRAGON) and it was checked for errors. It is possible that there are still dictated errors within this office note. If so, please bring any errors to my attention for an addendum. All efforts were made to ensure that this office note is accurate.  _________________  I, Dr. Buckley Ormond, personally performed the services described in this documentation as described by PAUL Mcdonald in my presence, and it is both accurate and complete. Corrie Martinez MD, PhD  5/4/2022

## 2022-05-05 ENCOUNTER — TELEPHONE (OUTPATIENT)
Dept: ORTHOPEDIC SURGERY | Age: 67
End: 2022-05-05

## 2022-05-05 NOTE — TELEPHONE ENCOUNTER
CM call to Pt sister/guardian Sanjayon Essex to initiate discharge planning. Pt is from a group home, has 24/7 care through Home Site. Pt has DME to include wheelchair, shower chair, troy lift. Pt does not walk. Pt has insurance, pcp, and can afford medications. Delton Essex denies any needs at this time. Pt will need transportation home at discharge.       CM following for any needs General Question     Subject: PT'S IS HAVING SX ON 06/13 AND WANTS TO ASK ABOUT COW TISSUE PATCH.   Patient and /or Facility RequestHerchristiano Carmichael  Contact Number: [phone]

## 2022-05-17 ENCOUNTER — TELEPHONE (OUTPATIENT)
Dept: ORTHOPEDIC SURGERY | Age: 67
End: 2022-05-17

## 2022-05-20 ENCOUNTER — TELEPHONE (OUTPATIENT)
Dept: ORTHOPEDIC SURGERY | Age: 67
End: 2022-05-20

## 2022-05-20 NOTE — TELEPHONE ENCOUNTER
Auth: NPR  Date: 06/13/22  Reference # None  Spoke with: None  Type of SX: Outpatient  Location: [facility]  CPT: 40507, [id number]   DX: M12.811  SX area: Rt shoulder  Insurance: Medicare A&B

## 2022-06-03 ENCOUNTER — TELEPHONE (OUTPATIENT)
Dept: ORTHOPEDIC SURGERY | Age: 67
End: 2022-06-03

## 2022-06-03 NOTE — TELEPHONE ENCOUNTER
General Question     Subject: PT HAS A COUPLE QUESTIONS CONCERNING HER SX 06/13. DOES SHE NEED TO BR[facility]? DOES SHE NEED TO REMOVE HER POST EARRINGS?   Patient and /or Facility RequestTanjarod Bryant  Contact Number: [phone]

## 2022-06-06 ENCOUNTER — TELEPHONE (OUTPATIENT)
Dept: ORTHOPEDIC SURGERY | Age: 67
End: 2022-06-06

## 2022-06-06 NOTE — TELEPHONE ENCOUNTER
General Question     Subject: PT IS HAVING SX 06/13 AND SHE WANTS DR TO KNOW THAT SHE IS UNABLE TO TAKE PERCOCET DUE TO ALLERGIC REACTIONS,  AND SHE WILL NEED OXYCODONE  ALSO, SHE WANTS TO KNOW HOW SOON SHE CAN SHOWER AFTER SURGERY?   Patient and /or Facility RequestSavi Hammonds  Contact Number: [phone]

## 2022-06-09 RX ORDER — ASPIRIN 81 MG/1
81 TABLET ORAL DAILY
COMMUNITY

## 2022-06-09 NOTE — PROGRESS NOTES
Place patient label inside box (if no patient label, complete below)  Name:  :  MR#:     Mayela Sharif / PROCEDURE  1. I (we), Carl Suazo authorize DR Karma Fuller and/or such assistants as may be selected by him/her, to perform the following operation/procedure(s): RIGHT SHOULDER ARTHROSCOPY, DEBRIDEMENT WITH INSERTION OF BALLOON SPACER, POSSIBLE ROTATOR CUFF REPAIR   2. Note: If unable to obtain consent prior to an emergent procedure, document the emergent reason in the medical record. This procedure has been explained to my (our) satisfaction and included in the explanation was:  A) The intended benefit, nature, and extent of the procedure to be performed;  B) The significant risks involved and the probability of success;  C) Alternative procedures and methods of treatment;  D) The dangers and probable consequences of such alternatives (including no procedure or treatment); E) The expected consequences of the procedure on my future health;  F) Whether other qualified individuals would be performing important surgical tasks and/or whether  would be present to advise or support the procedure. I (we) understand that there are other risks of infection and other serious complications in the pre-operative/procedural and postoperative/procedural stages of my (our) care. I (we) have asked all of the questions which I (we) thought were important in deciding whether or not to undergo treatment or diagnosis. These questions have been answered to my (our) satisfaction. I (we) understand that no assurance can be given that the procedure will be a success, and no guarantee or warranty of success has been given to me (us).     3. It has been explained to me (us) that during the course of the operation/procedure, unforeseen conditions may be revealed that necessitate extension of the original procedure(s) or different procedure(s) than those set forth in Paragraph 1. I (we) authorize and request that the above-named physician, his/her assistants or his/her designees, perform procedures as necessary and desirable if deemed to be in my (our) best interest.     Revised 8/2/2021                                                                          Page 1 of 2           4. I acknowledge that health care personnel may be observing this procedure for the purpose of medical education or other specified purposes as may be necessary as requested and/or approved by my (our) physician. 5. I (we) consent to the disposal by the hospital Pathologist of the removed tissue, parts or organs in accordance with hospital policy. 6. I do ____ do not ____ consent to the use of a local infiltration pain blocking agent that will be used by my provider/surgical provider to help alleviate pain during my procedure. 7. I do ____ do not ____ consent to an emergent blood transfusion in the case of a life-threatening situation that requires blood components to be administered. This consent is valid for 24 hours from the beginning of the procedure. 8. This patient does ____ or does not ____ currently have a DNR status/order. If DNR order is in place, obtain Addendum to the Surgical Consent for ALL Patients with a DNR Order to address wendi-operative status for limited intervention or DNR suspension.      9. I have read and fully understand the above Consent for Operation/Procedure and that all blanks were completed before I signed the consent.   _____________________________       _____________________      ____/____am/pm  Signature of Patient or legal representative      Printed Name / Relationship            Date / Time   ____________________________       _____________________      ____/____am/pm  Witness to Signature                                    Printed Name                    Date / Time     If patient is unable to sign or is a minor, complete the following)  Patient is a minor, ____ years of age, or unable to sign because:   ______________________________________________________________________________________________    [facility] If a phone consent is obtained, consent will be documented by using two health care professionals, each affirming that the consenting party has no questions and gives consent for the procedure discussed with the physician/provider.   _____________________          ____________________       _____/_____am/pm   2nd witness to phone consent        Printed name           Date / Time    Informed Consent:  I have provided the explanation described above in section 1 to the patient and/or legal representative.  I have provided the patient and/or legal representative with an opportunity to ask any questions about the proposed operation/procedure.   ___________________________          ____________________         ____/____am/pm  Provider / Proceduralist                            Printed name            Date / Time  Revised 8/2/2021                                                                      Page 2 of 2

## 2022-06-09 NOTE — PROGRESS NOTES
PRE-OP INSTRUCTIONS FOR THE SURGICAL PATIENT YOU ARE UNABLE TO MAKE CONTACT FOR AN INTERVIEW:        1. Follow all instructions provided to you from your surgeons office, including your ARRIVAL TIME. 2. Enter the MAIN entrance located on [address] and report to the desk. 3. Bring your insurance & photo ID with you. You may also be asked to pay a co-pay, as you may want to bring a check or credit card with you. 4. Leave all other valuables at home. 5. Arrange for someone to drive you home and be with you for the first 24 hours after discharge. 6. Bring your medication list with you day of surgery with doses and frequency listed (including over the counter medications)  7. You must contact your surgeon for Instructions regarding:              - ALL medication instructions, especially if taking blood thinners, aspirin, or diabetic medication.         -Bariatric patients call surgeon if on diabetic medications as some need to be stopped 1 week preop  - IF  there is a change in your physical condition such as a cold, fever, rash, cuts, sores or any other infection, especially near your surgical site. 8. A Pre-op History and Physical for surgery MUST be completed by your Physician or an Urgent Care within 30 days of your procedure date. Please bring a copy with you on the day of your procedure and along with any other testing performed. 9. DO NOT EAT ANYTHING eight hours prior to arrival for surgery. May have up to 8 ounces of water 4 hours prior to arrival for surgery. NOTE: ALL Gastric, Bariatric and Bowel surgery patients MUST follow their surgeon's instructions. 10. No gum, candy, mints, or ice chips day of procedure. 11. Please refrain from drinking alcohol the day before or day of your procedure. 12. Please do not smoke the day of your procedure. 13. Dress in loose, comfortable clothing appropriate for redressing after your procedure.  Do not wear jewelry (including body piercings), make-up, fingernail polish, lotion, powders or metal hairclips. 15. Contacts will need to be removed prior to surgery. You may want to bring your eye glasses to wear immediately before and after surgery. 14. Dentures will need to be removed before your procedure. 13. Bring cases for your glasses, contacts, dentures, or hearing aids to protect them while you are in surgery. 16. If you use a CPAP, please bring it with you on the day of your procedure. 17. Do not shave or wax for 72 hours prior to procedure near your operative site  18. FOR WOMAN OF CHILDBEARING AGE ONLY- please make sure we can collect a urine sample on arrival.     If you have further questions, you may contact your surgeon's office or us at [phone]     Left instructions on patient's voicemail.     Tia Woodward RN.6/9/2022 .1:12 PM

## 2022-06-10 ENCOUNTER — ANESTHESIA EVENT (OUTPATIENT)
Dept: OPERATING ROOM | Age: 67
End: 2022-06-10
Payer: MEDICARE

## 2022-06-13 ENCOUNTER — TELEPHONE (OUTPATIENT)
Dept: ORTHOPEDIC SURGERY | Age: 67
End: 2022-06-13

## 2022-06-13 ENCOUNTER — HOSPITAL ENCOUNTER (OUTPATIENT)
Age: 67
Setting detail: OUTPATIENT SURGERY
Discharge: HOME | End: 2022-06-13
Attending: ORTHOPAEDIC SURGERY | Admitting: ORTHOPAEDIC SURGERY
Payer: MEDICARE

## 2022-06-13 ENCOUNTER — ANESTHESIA (OUTPATIENT)
Dept: OPERATING ROOM | Age: 67
End: 2022-06-13
Payer: MEDICARE

## 2022-06-13 VITALS
OXYGEN SATURATION: 98 % | RESPIRATION RATE: 16 BRPM | WEIGHT: 110 LBS | HEART RATE: 63 BPM | BODY MASS INDEX: 19.49 KG/M2 | SYSTOLIC BLOOD PRESSURE: 117 MMHG | HEIGHT: 63 IN | TEMPERATURE: 97.7 F | DIASTOLIC BLOOD PRESSURE: 69 MMHG

## 2022-06-13 DIAGNOSIS — Z98.890 S/P ARTHROSCOPY OF RIGHT SHOULDER: ICD-10-CM

## 2022-06-13 DIAGNOSIS — Z98.890 S/P ARTHROSCOPY OF SHOULDER: Primary | ICD-10-CM

## 2022-06-13 PROCEDURE — 7100000001 HC PACU RECOVERY - ADDTL 15 MIN: Performed by: ORTHOPAEDIC SURGERY

## 2022-06-13 PROCEDURE — 6360000002 HC RX W HCPCS: Performed by: ANESTHESIOLOGY

## 2022-06-13 PROCEDURE — 6360000002 HC RX W HCPCS: Performed by: NURSE ANESTHETIST, CERTIFIED REGISTERED

## 2022-06-13 PROCEDURE — 2709999900 HC NON-CHARGEABLE SUPPLY: Performed by: ORTHOPAEDIC SURGERY

## 2022-06-13 PROCEDURE — 7100000011 HC PHASE II RECOVERY - ADDTL 15 MIN: Performed by: ORTHOPAEDIC SURGERY

## 2022-06-13 PROCEDURE — 7100000010 HC PHASE II RECOVERY - FIRST 15 MIN: Performed by: ORTHOPAEDIC SURGERY

## 2022-06-13 PROCEDURE — 64415 NJX AA&/STRD BRCH PLXS IMG: CPT | Performed by: ANESTHESIOLOGY

## 2022-06-13 PROCEDURE — 2580000003 HC RX 258: Performed by: ANESTHESIOLOGY

## 2022-06-13 PROCEDURE — 2720000010 HC SURG SUPPLY STERILE: Performed by: ORTHOPAEDIC SURGERY

## 2022-06-13 PROCEDURE — 6360000002 HC RX W HCPCS: Performed by: ORTHOPAEDIC SURGERY

## 2022-06-13 PROCEDURE — 3700000001 HC ADD 15 MINUTES (ANESTHESIA): Performed by: ORTHOPAEDIC SURGERY

## 2022-06-13 PROCEDURE — C1763 CONN TISS, NON-HUMAN: HCPCS | Performed by: ORTHOPAEDIC SURGERY

## 2022-06-13 PROCEDURE — 7100000000 HC PACU RECOVERY - FIRST 15 MIN: Performed by: ORTHOPAEDIC SURGERY

## 2022-06-13 PROCEDURE — 2580000003 HC RX 258: Performed by: NURSE ANESTHETIST, CERTIFIED REGISTERED

## 2022-06-13 PROCEDURE — 2500000003 HC RX 250 WO HCPCS: Performed by: NURSE ANESTHETIST, CERTIFIED REGISTERED

## 2022-06-13 PROCEDURE — 3600000004 HC SURGERY LEVEL 4 BASE: Performed by: ORTHOPAEDIC SURGERY

## 2022-06-13 PROCEDURE — 2500000003 HC RX 250 WO HCPCS: Performed by: ANESTHESIOLOGY

## 2022-06-13 PROCEDURE — C1713 ANCHOR/SCREW BN/BN,TIS/BN: HCPCS | Performed by: ORTHOPAEDIC SURGERY

## 2022-06-13 PROCEDURE — 3700000000 HC ANESTHESIA ATTENDED CARE: Performed by: ORTHOPAEDIC SURGERY

## 2022-06-13 PROCEDURE — 2580000003 HC RX 258: Performed by: ORTHOPAEDIC SURGERY

## 2022-06-13 PROCEDURE — C9290 INJ, BUPIVACAINE LIPOSOME: HCPCS | Performed by: ANESTHESIOLOGY

## 2022-06-13 PROCEDURE — 3600000014 HC SURGERY LEVEL 4 ADDTL 15MIN: Performed by: ORTHOPAEDIC SURGERY

## 2022-06-13 DEVICE — IMPLANTABLE DEVICE
Type: IMPLANTABLE DEVICE | Site: SHOULDER | Status: FUNCTIONAL
Brand: BIOINDUCTIVE IMPLANT WITH ARTHROSCOPIC DELIVERY SYSTEM - LARGE

## 2022-06-13 DEVICE — ANCHOR TEND 8 FOR REGENETEN BIOINDUCTIVE IMPL SYS: Type: IMPLANTABLE DEVICE | Site: SHOULDER | Status: FUNCTIONAL

## 2022-06-13 DEVICE — BONE ANCHORS 3 WITH ARTHROSCOPIC DELIVERY SYSTEM ADVANCED
Type: IMPLANTABLE DEVICE | Site: SHOULDER | Status: FUNCTIONAL
Brand: BONE ANCHORS WITH ARTHROSCOPIC DELIVERY SYSTEM - ADVANCED

## 2022-06-13 DEVICE — IMPLANTABLE DEVICE: Type: IMPLANTABLE DEVICE | Site: SHOULDER | Status: FUNCTIONAL

## 2022-06-13 RX ORDER — VITAMIN B COMPLEX
1 CAPSULE ORAL DAILY
COMMUNITY

## 2022-06-13 RX ORDER — SODIUM CHLORIDE, SODIUM LACTATE, POTASSIUM CHLORIDE, CALCIUM CHLORIDE 600; 310; 30; 20 MG/100ML; MG/100ML; MG/100ML; MG/100ML
INJECTION, SOLUTION INTRAVENOUS CONTINUOUS PRN
Status: DISCONTINUED | OUTPATIENT
Start: 2022-06-13 | End: 2022-06-13 | Stop reason: SDUPTHER

## 2022-06-13 RX ORDER — ONDANSETRON 2 MG/ML
INJECTION INTRAMUSCULAR; INTRAVENOUS PRN
Status: DISCONTINUED | OUTPATIENT
Start: 2022-06-13 | End: 2022-06-13 | Stop reason: SDUPTHER

## 2022-06-13 RX ORDER — FENTANYL CITRATE 50 UG/ML
INJECTION, SOLUTION INTRAMUSCULAR; INTRAVENOUS PRN
Status: DISCONTINUED | OUTPATIENT
Start: 2022-06-13 | End: 2022-06-13 | Stop reason: SDUPTHER

## 2022-06-13 RX ORDER — PROPOFOL 10 MG/ML
INJECTION, EMULSION INTRAVENOUS PRN
Status: DISCONTINUED | OUTPATIENT
Start: 2022-06-13 | End: 2022-06-13 | Stop reason: SDUPTHER

## 2022-06-13 RX ORDER — ASPIRIN 325 MG
325 TABLET, DELAYED RELEASE (ENTERIC COATED) ORAL 2 TIMES DAILY
Qty: 28 TABLET | Refills: 0 | Status: SHIPPED | OUTPATIENT
Start: 2022-06-13 | End: 2022-08-10

## 2022-06-13 RX ORDER — LABETALOL HYDROCHLORIDE 5 MG/ML
10 INJECTION, SOLUTION INTRAVENOUS
Status: DISCONTINUED | OUTPATIENT
Start: 2022-06-13 | End: 2022-06-13 | Stop reason: HOSPADM

## 2022-06-13 RX ORDER — BUPIVACAINE HYDROCHLORIDE 5 MG/ML
INJECTION, SOLUTION EPIDURAL; INTRACAUDAL PRN
Status: DISCONTINUED | OUTPATIENT
Start: 2022-06-13 | End: 2022-06-13 | Stop reason: SDUPTHER

## 2022-06-13 RX ORDER — BUPIVACAINE HYDROCHLORIDE 5 MG/ML
INJECTION, SOLUTION EPIDURAL; INTRACAUDAL
Status: COMPLETED
Start: 2022-06-13 | End: 2022-06-13

## 2022-06-13 RX ORDER — SODIUM CHLORIDE 9 MG/ML
INJECTION, SOLUTION INTRAVENOUS PRN
Status: DISCONTINUED | OUTPATIENT
Start: 2022-06-13 | End: 2022-06-13 | Stop reason: HOSPADM

## 2022-06-13 RX ORDER — SUCCINYLCHOLINE/SOD CL,ISO/PF 200MG/10ML
SYRINGE (ML) INTRAVENOUS PRN
Status: DISCONTINUED | OUTPATIENT
Start: 2022-06-13 | End: 2022-06-13 | Stop reason: SDUPTHER

## 2022-06-13 RX ORDER — SODIUM CHLORIDE 0.9 % (FLUSH) 0.9 %
5-40 SYRINGE (ML) INJECTION PRN
Status: DISCONTINUED | OUTPATIENT
Start: 2022-06-13 | End: 2022-06-13 | Stop reason: HOSPADM

## 2022-06-13 RX ORDER — FENTANYL CITRATE 50 UG/ML
50 INJECTION, SOLUTION INTRAMUSCULAR; INTRAVENOUS EVERY 5 MIN PRN
Status: DISCONTINUED | OUTPATIENT
Start: 2022-06-13 | End: 2022-06-13 | Stop reason: HOSPADM

## 2022-06-13 RX ORDER — HYDRALAZINE HYDROCHLORIDE 20 MG/ML
10 INJECTION INTRAMUSCULAR; INTRAVENOUS
Status: DISCONTINUED | OUTPATIENT
Start: 2022-06-13 | End: 2022-06-13 | Stop reason: HOSPADM

## 2022-06-13 RX ORDER — ONDANSETRON 2 MG/ML
4 INJECTION INTRAMUSCULAR; INTRAVENOUS
Status: DISCONTINUED | OUTPATIENT
Start: 2022-06-13 | End: 2022-06-13 | Stop reason: HOSPADM

## 2022-06-13 RX ORDER — OXYCODONE HYDROCHLORIDE AND ACETAMINOPHEN 5; 325 MG/1; MG/1
1 TABLET ORAL EVERY 6 HOURS PRN
Qty: 35 TABLET | Refills: 0 | Status: CANCELLED | OUTPATIENT
Start: 2022-06-13 | End: 2022-06-20

## 2022-06-13 RX ORDER — SODIUM CHLORIDE, SODIUM LACTATE, POTASSIUM CHLORIDE, CALCIUM CHLORIDE 600; 310; 30; 20 MG/100ML; MG/100ML; MG/100ML; MG/100ML
INJECTION, SOLUTION INTRAVENOUS CONTINUOUS
Status: DISCONTINUED | OUTPATIENT
Start: 2022-06-13 | End: 2022-06-13 | Stop reason: HOSPADM

## 2022-06-13 RX ORDER — ROCURONIUM BROMIDE 10 MG/ML
INJECTION, SOLUTION INTRAVENOUS PRN
Status: DISCONTINUED | OUTPATIENT
Start: 2022-06-13 | End: 2022-06-13 | Stop reason: SDUPTHER

## 2022-06-13 RX ORDER — SODIUM CHLORIDE 0.9 % (FLUSH) 0.9 %
5-40 SYRINGE (ML) INJECTION EVERY 12 HOURS SCHEDULED
Status: DISCONTINUED | OUTPATIENT
Start: 2022-06-13 | End: 2022-06-13 | Stop reason: HOSPADM

## 2022-06-13 RX ORDER — CALCIUM CARBONATE 500(1250)
1500 TABLET ORAL DAILY
COMMUNITY

## 2022-06-13 RX ORDER — HYDROCODONE BITARTRATE AND ACETAMINOPHEN 5; 325 MG/1; MG/1
1 TABLET ORAL EVERY 6 HOURS PRN
Qty: 35 TABLET | Refills: 0 | Status: SHIPPED | OUTPATIENT
Start: 2022-06-13 | End: 2022-06-20

## 2022-06-13 RX ORDER — GLYCOPYRROLATE 1 MG/5 ML
SYRINGE (ML) INTRAVENOUS PRN
Status: DISCONTINUED | OUTPATIENT
Start: 2022-06-13 | End: 2022-06-13 | Stop reason: SDUPTHER

## 2022-06-13 RX ORDER — FENTANYL CITRATE 50 UG/ML
25 INJECTION, SOLUTION INTRAMUSCULAR; INTRAVENOUS EVERY 5 MIN PRN
Status: DISCONTINUED | OUTPATIENT
Start: 2022-06-13 | End: 2022-06-13 | Stop reason: HOSPADM

## 2022-06-13 RX ORDER — DEXAMETHASONE SODIUM PHOSPHATE 4 MG/ML
INJECTION, SOLUTION INTRA-ARTICULAR; INTRALESIONAL; INTRAMUSCULAR; INTRAVENOUS; SOFT TISSUE PRN
Status: DISCONTINUED | OUTPATIENT
Start: 2022-06-13 | End: 2022-06-13 | Stop reason: SDUPTHER

## 2022-06-13 RX ORDER — ASCORBIC ACID 500 MG
1000 TABLET ORAL DAILY
COMMUNITY

## 2022-06-13 RX ORDER — MEPERIDINE HYDROCHLORIDE 25 MG/ML
12.5 INJECTION INTRAMUSCULAR; INTRAVENOUS; SUBCUTANEOUS EVERY 5 MIN PRN
Status: DISCONTINUED | OUTPATIENT
Start: 2022-06-13 | End: 2022-06-13 | Stop reason: HOSPADM

## 2022-06-13 RX ORDER — SENNA PLUS 8.6 MG/1
1 TABLET ORAL 2 TIMES DAILY PRN
Qty: 10 TABLET | Refills: 0 | Status: SHIPPED | OUTPATIENT
Start: 2022-06-13 | End: 2022-06-18

## 2022-06-13 RX ORDER — LIDOCAINE HYDROCHLORIDE 20 MG/ML
INJECTION, SOLUTION INTRAVENOUS PRN
Status: DISCONTINUED | OUTPATIENT
Start: 2022-06-13 | End: 2022-06-13 | Stop reason: SDUPTHER

## 2022-06-13 RX ORDER — MIDAZOLAM HYDROCHLORIDE 1 MG/ML
INJECTION INTRAMUSCULAR; INTRAVENOUS
Status: COMPLETED
Start: 2022-06-13 | End: 2022-06-13

## 2022-06-13 RX ORDER — MIDAZOLAM HYDROCHLORIDE 1 MG/ML
INJECTION INTRAMUSCULAR; INTRAVENOUS PRN
Status: DISCONTINUED | OUTPATIENT
Start: 2022-06-13 | End: 2022-06-13 | Stop reason: SDUPTHER

## 2022-06-13 RX ORDER — PHENYLEPHRINE HYDROCHLORIDE 10 MG/ML
INJECTION INTRAVENOUS PRN
Status: DISCONTINUED | OUTPATIENT
Start: 2022-06-13 | End: 2022-06-13 | Stop reason: SDUPTHER

## 2022-06-13 RX ORDER — PROCHLORPERAZINE EDISYLATE 5 MG/ML
5 INJECTION INTRAMUSCULAR; INTRAVENOUS
Status: DISCONTINUED | OUTPATIENT
Start: 2022-06-13 | End: 2022-06-13 | Stop reason: HOSPADM

## 2022-06-13 RX ORDER — ONDANSETRON 4 MG/1
4 TABLET, FILM COATED ORAL EVERY 8 HOURS PRN
Qty: 15 TABLET | Refills: 0 | Status: SHIPPED | OUTPATIENT
Start: 2022-06-13 | End: 2022-06-18

## 2022-06-13 RX ADMIN — PHENYLEPHRINE HYDROCHLORIDE 100 MCG: 10 INJECTION INTRAVENOUS at 12:52

## 2022-06-13 RX ADMIN — PHENYLEPHRINE HYDROCHLORIDE 50 MCG: 10 INJECTION INTRAVENOUS at 13:37

## 2022-06-13 RX ADMIN — PHENYLEPHRINE HYDROCHLORIDE 100 MCG: 10 INJECTION INTRAVENOUS at 12:16

## 2022-06-13 RX ADMIN — FENTANYL CITRATE 50 MCG: 50 INJECTION, SOLUTION INTRAMUSCULAR; INTRAVENOUS at 12:28

## 2022-06-13 RX ADMIN — PHENYLEPHRINE HYDROCHLORIDE 50 MCG: 10 INJECTION INTRAVENOUS at 14:19

## 2022-06-13 RX ADMIN — SUGAMMADEX 100 MG: 100 INJECTION, SOLUTION INTRAVENOUS at 14:22

## 2022-06-13 RX ADMIN — PHENYLEPHRINE HYDROCHLORIDE 50 MCG: 10 INJECTION INTRAVENOUS at 12:58

## 2022-06-13 RX ADMIN — PHENYLEPHRINE HYDROCHLORIDE 50 MCG: 10 INJECTION INTRAVENOUS at 12:31

## 2022-06-13 RX ADMIN — MIDAZOLAM HYDROCHLORIDE 2 MG: 2 INJECTION, SOLUTION INTRAMUSCULAR; INTRAVENOUS at 10:31

## 2022-06-13 RX ADMIN — PROPOFOL 150 MG: 10 INJECTION, EMULSION INTRAVENOUS at 11:55

## 2022-06-13 RX ADMIN — PHENYLEPHRINE HYDROCHLORIDE 50 MCG: 10 INJECTION INTRAVENOUS at 13:17

## 2022-06-13 RX ADMIN — PHENYLEPHRINE HYDROCHLORIDE 200 MCG: 10 INJECTION INTRAVENOUS at 12:37

## 2022-06-13 RX ADMIN — Medication 0.2 MG: at 12:37

## 2022-06-13 RX ADMIN — PHENYLEPHRINE HYDROCHLORIDE 100 MCG: 10 INJECTION INTRAVENOUS at 13:22

## 2022-06-13 RX ADMIN — PHENYLEPHRINE HYDROCHLORIDE 100 MCG: 10 INJECTION INTRAVENOUS at 12:25

## 2022-06-13 RX ADMIN — LIDOCAINE HYDROCHLORIDE 60 MG: 20 INJECTION, SOLUTION INTRAVENOUS at 11:55

## 2022-06-13 RX ADMIN — PHENYLEPHRINE HYDROCHLORIDE 100 MCG: 10 INJECTION INTRAVENOUS at 12:46

## 2022-06-13 RX ADMIN — CEFAZOLIN 2000 MG: 2 INJECTION, POWDER, FOR SOLUTION INTRAMUSCULAR; INTRAVENOUS at 11:50

## 2022-06-13 RX ADMIN — Medication 0.2 MG: at 14:20

## 2022-06-13 RX ADMIN — BUPIVACAINE 10 ML: 13.3 INJECTION, SUSPENSION, LIPOSOMAL INFILTRATION at 10:37

## 2022-06-13 RX ADMIN — PHENYLEPHRINE HYDROCHLORIDE 100 MCG: 10 INJECTION INTRAVENOUS at 13:28

## 2022-06-13 RX ADMIN — PHENYLEPHRINE HYDROCHLORIDE 100 MCG: 10 INJECTION INTRAVENOUS at 13:10

## 2022-06-13 RX ADMIN — SODIUM CHLORIDE, SODIUM LACTATE, POTASSIUM CHLORIDE, AND CALCIUM CHLORIDE: .6; .31; .03; .02 INJECTION, SOLUTION INTRAVENOUS at 11:51

## 2022-06-13 RX ADMIN — ONDANSETRON 4 MG: 2 INJECTION INTRAMUSCULAR; INTRAVENOUS at 12:17

## 2022-06-13 RX ADMIN — DEXAMETHASONE SODIUM PHOSPHATE 4 MG: 4 INJECTION, SOLUTION INTRAMUSCULAR; INTRAVENOUS at 12:17

## 2022-06-13 RX ADMIN — ROCURONIUM BROMIDE 5 MG: 10 INJECTION INTRAVENOUS at 11:55

## 2022-06-13 RX ADMIN — FENTANYL CITRATE 25 MCG: 50 INJECTION, SOLUTION INTRAMUSCULAR; INTRAVENOUS at 14:23

## 2022-06-13 RX ADMIN — PHENYLEPHRINE HYDROCHLORIDE 50 MCG: 10 INJECTION INTRAVENOUS at 13:04

## 2022-06-13 RX ADMIN — SODIUM CHLORIDE, POTASSIUM CHLORIDE, SODIUM LACTATE AND CALCIUM CHLORIDE: 600; 310; 30; 20 INJECTION, SOLUTION INTRAVENOUS at 10:20

## 2022-06-13 RX ADMIN — Medication 0.2 MG: at 11:57

## 2022-06-13 RX ADMIN — FENTANYL CITRATE 25 MCG: 50 INJECTION, SOLUTION INTRAMUSCULAR; INTRAVENOUS at 14:27

## 2022-06-13 RX ADMIN — SODIUM CHLORIDE, SODIUM LACTATE, POTASSIUM CHLORIDE, AND CALCIUM CHLORIDE: .6; .31; .03; .02 INJECTION, SOLUTION INTRAVENOUS at 12:23

## 2022-06-13 RX ADMIN — BUPIVACAINE HYDROCHLORIDE 20 ML: 5 INJECTION, SOLUTION EPIDURAL; INTRACAUDAL; PERINEURAL at 10:37

## 2022-06-13 RX ADMIN — ROCURONIUM BROMIDE 25 MG: 10 INJECTION INTRAVENOUS at 12:06

## 2022-06-13 RX ADMIN — PHENYLEPHRINE HYDROCHLORIDE 100 MCG: 10 INJECTION INTRAVENOUS at 13:43

## 2022-06-13 RX ADMIN — Medication 120 MG: at 11:55

## 2022-06-13 ASSESSMENT — PAIN DESCRIPTION - LOCATION: LOCATION: SHOULDER

## 2022-06-13 ASSESSMENT — PAIN SCALES - GENERAL
PAINLEVEL_OUTOF10: 0
PAINLEVEL_OUTOF10: 6
PAINLEVEL_OUTOF10: 0

## 2022-06-13 ASSESSMENT — PAIN DESCRIPTION - DESCRIPTORS: DESCRIPTORS: ACHING

## 2022-06-13 ASSESSMENT — PAIN DESCRIPTION - ONSET: ONSET: ON-GOING

## 2022-06-13 ASSESSMENT — PAIN - FUNCTIONAL ASSESSMENT: PAIN_FUNCTIONAL_ASSESSMENT: PREVENTS OR INTERFERES WITH MANY ACTIVE NOT PASSIVE ACTIVITIES

## 2022-06-13 ASSESSMENT — PAIN DESCRIPTION - PAIN TYPE: TYPE: CHRONIC PAIN

## 2022-06-13 ASSESSMENT — PAIN DESCRIPTION - ORIENTATION: ORIENTATION: RIGHT

## 2022-06-13 ASSESSMENT — PAIN DESCRIPTION - FREQUENCY: FREQUENCY: CONTINUOUS

## 2022-06-13 NOTE — BRIEF OP NOTE
Brief Postoperative Note      Patient: Anne Marie Wright  YOB: 1955  MRN: [id number]    Date of Procedure: 6/13/2022    Pre-Op Diagnosis: Rotator cuff arthropathy, right [M12.811]    Post-Op Diagnosis: Same       Procedure(s):  RIGHT SHOULDER ARTHROSCOPY, DEBRIDEMENT WITH INSERTION OF BALLOON SPACER, POSSIBLE ROTATOR CUFF REPAIR    Surgeon(s):  Yseenia Ware MD    Assistant:  Surgical Assistant: (Unknown)    Anesthesia: General    Estimated Blood Loss (mL): Minimal    Complications: None    Specimens:   * No specimens in log *    Implants:  * No surgical log found *      Drains: * No LDAs found *    Findings: see the op note     Electronically signed by Saul Bradley MD on 6/13/2022 at 7:37 AM

## 2022-06-13 NOTE — ANESTHESIA PROCEDURE NOTES
Peripheral Block    Patient location during procedure: pre-op  Start time: 6/13/2022 10:30 AM  End time: 6/13/2022 10:38 AM  Staffing  Performed: anesthesiologist   Anesthesiologist: Jose Munoz MD  Preanesthetic Checklist  Completed: patient identified, IV checked, site marked, risks and benefits discussed, surgical/procedural consents, equipment checked, pre-op evaluation, timeout performed, anesthesia consent given, oxygen available and monitors applied/VS acknowledged  Peripheral Block  Patient position: sitting  Prep: ChloraPrep  Patient monitoring: cardiac monitor, continuous pulse ox, frequent blood pressure checks and IV access  Block type: Brachial plexus  Laterality: right  Injection technique: single-shot  Guidance: ultrasound guided  Local infiltration: lidocaine  Infiltration strength: 1 %  Dose: 3 mL  Interscalene  Provider prep: mask and sterile gloves  Local infiltration: lidocaine  Needle  Needle gauge: 21 G  Needle length: 10 cm  Needle localization: ultrasound guidance  Assessment  Injection assessment: negative aspiration for heme, no paresthesia on injection and local visualized surrounding nerve on ultrasound  Paresthesia pain: none  Slow fractionated injection: yes  Hemodynamics: stablepermanent images obtainedOutcomes: patient tolerated procedure well  Additional Notes  Immediately prior to procedure a \"time out\" was called to verify the correct patient, allergies, laterality, procedure and equipment. Time out performed with pre op RN    Local Anesthetic: 0.5 %  Bupivacaine   Amount: 20 ml  in 5 ml increments after negative aspiration each time. Exparel 20 ml added to block. Anterior scalene and middle scalene muscles, upper, middle and lower trunks of the brachial plexus are identified, the tip of the needle and the spread of the local anesthetic around the brachial plexus are visualized.  The Brachial plexus appeared to be anatomically normal and there were no abnormal pathologically findings seen.          Reason for block: post-op pain management and at surgeon's request

## 2022-06-13 NOTE — PROGRESS NOTES
Ambulatory Surgery/Procedure Discharge Note    Vitals:    06/13/22 1644   BP: 117/69   Pulse: 63   Resp: 16   Temp: 97.7 °F (36.5 °C)   SpO2: 98%       In: 1300 [I.V.:1300]  Out: 175 [Urine:125]    Restroom use offered before discharge. Yes    Pain assessment:  {Pain assessment  Pain Level: 0    Shadowy pink drainage on right shoulder dressing. Right arm immobilizer on. Right fingers warm/ pink/brisk refill/no movement and no sensation. Discharge instructions reviewed. Patient and sister in room  educated, using the teach back method, about follow up instructions and discharge instructions. A completed copy of the AVS instructions given to patient and all questions answered. Ice bags home with her and has an ice man at home. Aware to use a towel between pad and patient. Sister states she has used it personally and made aware of risk of frostbite and her not being aware of it occurring because of numbness. Turquoise band on. She assisted with dressing. Tolerates standing without problems. While seated she said she felt some dizziness. 1705 Patient states feels ready to leave and sister states feels ready to take her. Patient discharged to home/self care.  Patient discharged via wheel chair by Rand Clarke to waiting family/S.O.       6/13/2022 4:59 PM

## 2022-06-13 NOTE — TELEPHONE ENCOUNTER
THIS HAS PREVIOUSLY BEEN DISCUSSED WITH THE PATIENT, SHE UNDERSTANDS THE SIDE EFFECTS OF THE MEDICATION AND THAT WE ALSO GIVE Ashleigh 15 WITH THE PAIN MEDS FOR THE [address] [place] [address].

## 2022-06-13 NOTE — H&P
Jc Castanon    [id number]    [facility] Same Day Surgery Update H & P  Department of General Surgery   Surgical Service   Pre-operative History and Physical  Last H & P within the last 30 days. DIAGNOSIS:   Rotator cuff arthropathy, right [M12.811]    Procedure(s):  RIGHT SHOULDER ARTHROSCOPY, DEBRIDEMENT WITH INSERTION OF BALLOON SPACER, POSSIBLE ROTATOR CUFF REPAIR    History obtained from: Patient interview and EHR      HISTORY OF PRESENT ILLNESS:   Patient is a 74 y.o. female with c/o chronic right shoulder pain. MRI demonstrates a chronic irreparable rotator cuff tear with mild underlying rotator cuff arthropathy, and a chronic biceps long head tendon tear. The symptoms have been recalcitrant to conservative treatment and the patient presents today for the above procedures. Illness screening:  Patient denies recent fever, chills, worsening cough, or known sick exposure     Past Medical History:        Diagnosis Date    Anxiety     Cancer ([facility] [address]     COLON    Osteoarthritis      Past Surgical History:        Procedure Laterality Date    COLECTOMY  2009    COLONOSCOPY      POLYPS AND COLON CANCER    COLONOSCOPY  7/20/15    ROTATOR CUFF REPAIR Bilateral     SHOULDER SURGERY         Medications Prior to Admission:      Prior to Admission medications    Medication Sig Start Date End Date Taking?  Authorizing Provider   calcium carbonate (OSCAL) 500 MG TABS tablet Take 1,500 mg by mouth daily   Yes Historical Provider, MD   CHOLECALCIFEROL PO Take 1 capsule by mouth every other day Pt unsure of dosage of capsule   Yes Historical Provider, MD   b complex vitamins capsule Take 1 capsule by mouth daily   Yes Historical Provider, MD   LYSINE PO Take 2 tablets by mouth daily   Yes Historical Provider, MD   vitamin C (ASCORBIC ACID) 500 MG tablet Take 1,000 mg by mouth daily   Yes Historical Provider, MD   Denosumab (PROLIA SC) Inject into the skin every 6 months   Yes Historical Provider, MD aspirin 81 MG EC tablet Take 81 mg by mouth daily   Yes Historical Provider, MD   escitalopram (LEXAPRO) 20 MG tablet Take 20 mg by mouth daily  1/3/22   Historical Provider, MD   traMADol (ULTRAM) 50 MG tablet Take 50 mg by mouth every 6 hours as needed. 3/7/22   Historical Provider, MD   Multiple Vitamins-Minerals (THERAPEUTIC MULTIVITAMIN-MINERALS) tablet Take 1 tablet by mouth daily    Historical Provider, MD         Allergies:  Sulfa antibiotics    PHYSICAL EXAM:      /81   Pulse 56   Temp 97 °F (36.1 °C) (Temporal)   Resp 16   Ht 5' 3\" (1.6 m)   Wt 110 lb (49.9 kg)   SpO2 99%   BMI 19.49 kg/m²      Airway:  Airway patent with no audible stridor. Heart:  Bradycardic, with regular rhythm. No murmur noted. Lungs:  No increased work of breathing, good air exchange, clear to auscultation bilaterally, no crackles or wheezing. Abdomen:  Soft, non-distended, non-tender, no rebound tenderness or guarding, and no masses palpated. ASSESSMENT AND PLAN     Patient is a 74 y.o. female with above specified procedure planned. 1.  The patients history and physical was obtained and signed off by the pre-admission testing department. Patient seen and focused exam done today- no new changes since last physical exam on 6/2/2022.    2.  Access to ancillary services are available per request of the provider.     OK Betts - CNP     6/13/2022

## 2022-06-13 NOTE — TELEPHONE ENCOUNTER
General Question     Subject: Patient's sister called to let you know that percocet makes her sister very sick and would need a different pain medication for after surgery.    Patient and /or Facility Request: Amosaung Givens (sister)   Contact Number: [phone]

## 2022-06-13 NOTE — PROGRESS NOTES
PACU Transfer to [facility]    Vitals:    06/13/22 1615   BP: 105/70   Pulse: 59   Resp: 19   Temp: 97 °F (36.1 °C)   SpO2: 91%         Intake/Output Summary (Last 24 hours) at 6/13/2022 1638  Last data filed at 6/13/2022 1422  Gross per 24 hour   Intake 1300 ml   Output 50 ml   Net 1250 ml       Pain assessment:  No pain  Pain Level: 0    Patient transferred to care of [facility] RN.   Pt voided via bed pan prior to transfer  6/13/2022 4:38 PM

## 2022-06-13 NOTE — ANESTHESIA POSTPROCEDURE EVALUATION
Department of Anesthesiology  Postprocedure Note    Patient: Christoph Adan  MRN: [id number]  YOB: 1955  Date of evaluation: 6/13/2022  Time:  5:17 PM     Procedure Summary     Date: 06/13/22 Room / Location: [facility] [address]    Anesthesia Start: 7355 Anesthesia Stop: 0297    Procedure: EVALUATION UNDER ANESTHESIA, RIGHT SHOULDER DIAGNOSTIC ARTHROSCOPY, EXTENSIVE DEBRIDEMENT, LYSIS OF ADHESIONS, REVISION OF RIGHT ROTATOR CUFF REPAIR WITH PATCH AUGMENTATION. REMOVAL OF RETAINED SUTURE. (Right Shoulder) Diagnosis:       Rotator cuff arthropathy, right      (Rotator cuff arthropathy, right [J21.668])    Surgeons: Jerrod Mckeon MD Responsible Provider: Dominik Hyman MD    Anesthesia Type: general ASA Status: 2          Anesthesia Type: No value filed. Ro Phase I: Ro Score: 10    Ro Phase II: Ro Score: 10    Last vitals: Reviewed and per EMR flowsheets.        Anesthesia Post Evaluation    Patient location during evaluation: PACU  Patient participation: complete - patient participated  Level of consciousness: awake and alert  Pain score: 0  Airway patency: patent  Nausea & Vomiting: no nausea and no vomiting  Complications: no  Cardiovascular status: hemodynamically stable  Respiratory status: acceptable  Hydration status: stable

## 2022-06-13 NOTE — TELEPHONE ENCOUNTER
I SPOKE WITH ABIMAEL AND LET HER KNOW THAT THE MESSAGE HAS BEEN SENT TO THE FELLOW, AND THAT I ALSO DISCUSSED THE MEDICATION WITH KATIE LAST WEEK.  POST OP MEDS ARE TAKEN CARE OF AT [place] [place] [address]

## 2022-06-13 NOTE — PROGRESS NOTES
Pt to [facility] for right shoulder surgery. Pt is alert; oriented X 4; speech clear; breathing easily on RA; has chronic pain in right shoulder, but states she has full movement in same. Pt walks with steady gait without assist.  #18 IV placed in left forearm after warming pt. Dr. Irwin Wisdom administered right interscalene block using Exparel, and pt tolerated well. Green bracelet placed on pt's wrist.  Pt and sister Rashawn Goodwin at bedside verbalized understanding of keeping green bracelet on pt's wrist for 96 hours. Ancef 2 g IVPB will go to OR with pt. Pt also brought splint/sling for shoulder, and same will go to surgery with pt. Sister is at bedside and will be in waiting room. Call light within reach.

## 2022-06-13 NOTE — ANESTHESIA PRE PROCEDURE
Department of Anesthesiology  Preprocedure Note       Name:  Melisa Issa   Age:  74 y.o.  :  1955                                          MRN:  [id number]         Date:  2022      Surgeon: Scottie Luna):  Kinza Aldana MD    Procedure: Procedure(s):  RIGHT SHOULDER ARTHROSCOPY, DEBRIDEMENT WITH INSERTION OF BALLOON SPACER, POSSIBLE ROTATOR CUFF REPAIR    Medications prior to admission:   Prior to Admission medications    Medication Sig Start Date End Date Taking? Authorizing Provider   calcium carbonate (OSCAL) 500 MG TABS tablet Take 1,500 mg by mouth daily   Yes Historical Provider, MD   CHOLECALCIFEROL PO Take 1 capsule by mouth every other day Pt unsure of dosage of capsule   Yes Historical Provider, MD   b complex vitamins capsule Take 1 capsule by mouth daily   Yes Historical Provider, MD   LYSINE PO Take 2 tablets by mouth daily   Yes Historical Provider, MD   vitamin C (ASCORBIC ACID) 500 MG tablet Take 1,000 mg by mouth daily   Yes Historical Provider, MD   Denosumab (PROLIA SC) Inject into the skin every 6 months   Yes Historical Provider, MD   aspirin 81 MG EC tablet Take 81 mg by mouth daily   Yes Historical Provider, MD   escitalopram (LEXAPRO) 20 MG tablet Take 20 mg by mouth daily  1/3/22   Historical Provider, MD   traMADol (ULTRAM) 50 MG tablet Take 50 mg by mouth every 6 hours as needed.   3/7/22   Historical Provider, MD   Multiple Vitamins-Minerals (THERAPEUTIC MULTIVITAMIN-MINERALS) tablet Take 1 tablet by mouth daily    Historical Provider, MD       Current medications:    Current Facility-Administered Medications   Medication Dose Route Frequency Provider Last Rate Last Admin    ceFAZolin (ANCEF) 2,000 mg in sodium chloride 0.9 % 50 mL IVPB (mini-bag)  2,000 mg IntraVENous Once Kinza Aldana MD        lactated ringers infusion   IntraVENous Continuous Radhika Marquis DO        lactated ringers infusion   IntraVENous Continuous Edilberto Chen MD        sodium chloride flush 0.9 % injection 5-40 mL  5-40 mL IntraVENous 2 times per day Selina Lopes MD        sodium chloride flush 0.9 % injection 5-40 mL  5-40 mL IntraVENous PRN Selina Lopes MD        0.9 % sodium chloride infusion   IntraVENous PRN Selina Lopes MD           Allergies: Allergies   Allergen Reactions    Sulfa Antibiotics Rash     High temp and rash -- was in her 20's when reaction occurred       Problem List:    Patient Active Problem List   Diagnosis Code    Anorectal polyp K62.0, K62.1    Complete rectal prolapse with displacement of anal sphincter K62.3    Constipation K59.00    Fecal incontinence due to anorectal disorder K62.9, R15.9    Fibroid, uterine D25.9       Past Medical History:        Diagnosis Date    Anxiety     Cancer ([facility] [address]     COLON    Osteoarthritis        Past Surgical History:        Procedure Laterality Date    COLECTOMY  2009    COLONOSCOPY      POLYPS AND COLON CANCER    COLONOSCOPY  7/20/15    ROTATOR CUFF REPAIR Bilateral     SHOULDER SURGERY         Social History:    Social History     Tobacco Use    Smoking status: Former Smoker     Packs/day: 1.50     Years: 15.00     Pack years: 22.50     Types: Cigarettes     Quit date: 2001     Years since quittin.4    Smokeless tobacco: Never Used   Substance Use Topics    Alcohol use:  No                                Counseling given: Not Answered      Vital Signs (Current):   Vitals:    22 1315 22 0909   BP:  139/81   Pulse:  56   Resp:  16   Temp:  97 °F (36.1 °C)   TempSrc:  Temporal   SpO2:  99%   Weight: 111 lb (50.3 kg) 110 lb (49.9 kg)   Height: 5' 3\" (1.6 m) 5' 3\" (1.6 m)                                              BP Readings from Last 3 Encounters:   22 139/81   07/20/15 102/61       NPO Status: Time of last liquid consumption: 600                        Time of last solid consumption:                         Date of last liquid consumption: 22 Date of last solid food consumption: 06/12/22    BMI:   Wt Readings from Last 3 Encounters:   06/13/22 110 lb (49.9 kg)   05/04/22 110 lb (49.9 kg)   04/06/22 110 lb (49.9 kg)     Body mass index is 19.49 kg/m². CBC: No results found for: WBC, RBC, HGB, HCT, MCV, RDW, PLT    CMP: No results found for: NA, K, CL, CO2, BUN, CREATININE, GFRAA, AGRATIO, LABGLOM, GLUCOSE, GLU, PROT, CALCIUM, BILITOT, ALKPHOS, AST, ALT    POC Tests: No results for input(s): POCGLU, POCNA, POCK, POCCL, POCBUN, POCHEMO, POCHCT in the last 72 hours. Coags: No results found for: PROTIME, INR, APTT    HCG (If Applicable): No results found for: PREGTESTUR, PREGSERUM, HCG, HCGQUANT     ABGs: No results found for: PHART, PO2ART, LWJ3KOD, ONP5ESJ, BEART, B7BQLPGC     Type & Screen (If Applicable):  No results found for: LABABO, LABRH    Drug/Infectious Status (If Applicable):  No results found for: HIV, HEPCAB    COVID-19 Screening (If Applicable): No results found for: COVID19        Anesthesia Evaluation  Patient summary reviewed and Nursing notes reviewed no history of anesthetic complications:   Airway: Mallampati: I  TM distance: >3 FB   Neck ROM: full  Mouth opening: > = 3 FB   Dental: normal exam         Pulmonary:Negative Pulmonary ROS and normal exam                               Cardiovascular:Negative CV ROS                      Neuro/Psych:   (+) neuromuscular disease:,             GI/Hepatic/Renal: Neg GI/Hepatic/Renal ROS            Endo/Other: Negative Endo/Other ROS                    Abdominal:             Vascular: negative vascular ROS. Other Findings:           Anesthesia Plan      general     ASA 2     (Right interscalene nerve for post op pain control per surgeon request)  Induction: intravenous. MIPS: Postoperative opioids intended and Prophylactic antiemetics administered. Anesthetic plan and risks discussed with patient. Plan discussed with CRNA.     Attending anesthesiologist reviewed and agrees with Preprocedure content      Post-op pain plan if not by surgeon: single peripheral nerve block            Beatrice Choi MD   6/13/2022

## 2022-06-13 NOTE — BRIEF OP NOTE
Brief Postoperative Note      Patient: Bozena Rosas  YOB: 1955  MRN: [id number]    Date of Procedure: 6/13/2022    Pre-Op Diagnosis: Rotator cuff arthropathy, right [M12.811]    Post-Op Diagnosis: Same       Procedure(s):  RIGHT SHOULDER ARTHROSCOPY, DEBRIDEMENT WITH INSERTION OF BALLOON SPACER, POSSIBLE ROTATOR CUFF REPAIR    Surgeon(s):  Inez Mcdonald MD    Assistant:  Surgical Assistant: Mechelle Jones    Anesthesia: General    Estimated Blood Loss (mL): Minimal    Complications: None    Specimens:   * No specimens in log *    Implants:  * No implants in log *      Drains: * No LDAs found *    Findings: see the post op note     Electronically signed by Anastacio Huang MD on 6/13/2022 at 12:10 PM

## 2022-06-13 NOTE — TELEPHONE ENCOUNTER
Primary Care doctor called to state this patient cannot have Percocet and wanted Dr. Terese Mcneil to be aware since she is having surgery this morning.

## 2022-06-14 ENCOUNTER — TELEPHONE (OUTPATIENT)
Dept: ORTHOPEDIC SURGERY | Age: 67
End: 2022-06-14

## 2022-06-14 NOTE — OP NOTE
[facility]               [address]                                OPERATIVE REPORT    PATIENT NAME: Jose Eduardo Arora                   :        1955  MED REC NO:   [id number]                          ROOM:  ACCOUNT NO:   [de-identified]                           ADMIT DATE: 2022  PROVIDER:     Sesar Frias MD    DATE OF PROCEDURE:  2022    PREOPERATIVE DIAGNOSIS:  Recurrent rotator cuff tear, right shoulder,  possible irreparable. POSTOPERATIVE DIAGNOSIS:  Irreparable massive three tendon recurrent  rotator cuff tear, retained sutures, posttraumatic and postsurgical  adhesions. PROCEDURES:  Right shoulder examination under anesthesia, diagnostic  arthroscopy, arthroscopic extensive debridement of rotator cuff, biceps  stump, labrum, glenohumeral arthritis, subacromial bursa, arthroscopic  lysis of extensive subdeltoid, subacromial and articular-sided adhesions  with subtotal synovectomy, arthroscopic removal of retained sutures,  arthroscopic revision of rotator cuff repair with collagen patch  augmentation. ANESTHESIA:  General anesthesia, interscalene block. IV FLUIDS:  1700 mL of crystalloids. ESTIMATED BLOOD LOSS:  50 mL. COMPLICATIONS:  None. SURGEON:  Sesar Frias MD    ASSISTANT:  Breanna Lei MD    Modifier 22 applies because of revision setting. The patient had a  previous rotator cuff with extensive adhesions. It is very hyperemic  and bled quite a bit. In addition, there were anchors from prior  repair, which complicated the anchor placement. All these required  extra planning, extra work, extra portals, and added roughly a doubling  of the time, required compared to an otherwise uncomplicated medium  tendon rotator cuff repair. IMPLANTS:  Arthrex BioComposite 5.5 fully-threaded corkscrew anchor x4  and one 6.5 BioComposite corkscrew anchor x1.     FINDINGS:  Examination under anesthesia revealed no focal motion  deficits. Diagnostic arthroscopy revealed some mild-to-moderate  glenohumeral arthritis with some degenerative labral fraying, biceps  previously tenodesed or torned. There is a large recurrent rotator cuff  tear. There is extensive degeneration of the tendon. There was  retained anchor that could be seen, but was not prominent enough to  require removal.  There was quite a bit of scarring and retearing that  needed us to take down all of this and carry out a revision of rotator  cuff repair. The upper subscapularis had not been repaired and this was  attenuated and not tensioned correctly, needed suture anchor repair. We  were able to get a good reconstitution of the footprint and repaired the  entire tendon including leading edge of the infraspinatus, entire  supraspinatus, and upper subscapularis. We used a single row technique,  previous acromioplasty did not need to be revised. We did have a couple  of suture anchors cut into the metal anchor threads, requiring  reinsertion of additional anchors including a larger anchor. BRIEF HISTORY AND PRESENTING ILLNESS:  As follows: The patient is a  58-year-old woman, who several weeks earlier had undergone primary  rotator cuff repair. She was well for a while, started having worsening  pain and weakness. MRI showed large recurrent rotator cuff tear. There  were some question as to whether this was irreparable and she was  consented about the possibility of balloon spacer, which was available,  but not used. She tried to live with this and failed multimodal  conservative treatment prior to considering surgery. Again consented  for revision rotator cuff repair versus insertion of subacromial spacer. She had been through surgery and what to expect. We explained the  balloon in great details. She understood risks, comfortable to the risk  that she encountered at mini-open rotator cuff repair.   She was  scheduled on an elective basis after appropriate preoperative medical  clearance. DESCRIPTION OF PROCEDURE:  On the date of the procedure, brought back to  the operating room, placed supine on the operating table. General  anesthesia was established. Preoperative antibiotics and interscalene  block were given in the holding area. Under anesthesia, exam was  carried out with the findings as noted above. The patient was  positioned using a [employer]et beach-chair positioner in the sitting position. All pressure points were padded. The patient's right shoulder and arm  were prepped and draped in a standard manner for arthroscopic shoulder  surgery. We used a [employer]et Spider for arm position. We marked the  previously opened incision and used couple of portals in line with the  incision. We began diagnostic arthroscopy. The arthroscope was  introduced into the glenohumeral joint through a standard posterior  portal.  Systematic diagnostic arthroscopy with findings as noted above. We established an anterior portal within the rotator interval.  We used  arthroscopic shaver across the metal cannula. This was used to debride  some scarring. We identified chronic biceps tendon tear release from  articular-sided adhesions from the synovectomy cautery. We identified  quite a bit of arthritis, debrided some degenerative labral fraying  circumferentially front to back and top to bottom, this was all done  with a shaver. We released some adhesions and rotator interval using cautery. We  redirected the arthroscope through same posterior portal above the  rotator cuff into the subacromial space. We established a lateral  portal under direct visualization and dilated with arthroscopic shaver  and performed a thorough bursectomy. We got a quite a bit of bleeding  because that tissue was hemorrhagic. We had to use cautery around the  base of the coracoid and we resected all the overlying scar.   We  resected the scar and the gutters anteriorly, posteriorly and laterally  using cautery mainly, but also shaver as needed. We identified large  recurrent rotator cuff tear, there was some degenerative tissue  laterally, this was debrided to stable margin. We used cautery to take  down some of the scarring and retearing continuity. Although, we did  the upper scapularis and exposed lesser tuberosity footprint, We lightly  debrided the footprint, lesser and greater using a peyton. We had to stay  clear off of this small little anchor. We brought the arm internally  rotated. Through various accessory portals including a high  anterolateral and posterolateral, we inserted a total of five anchors. Our first anchor was upper subscapularis footprint. We passed the  suture using FastPass Scorpion through a 30-mm PassPort cannula placed  laterally. We tied those suture to arm externally rotated. We used a  Revo knot followed by alternating half hitches. We used a standard  cutter with short tails. We then placed an anchor posteriorly and  anchor anteriorly. We started the anterior anchor. This was placed  about 5 to 6 mm from the articular margin, again after lightly debriding  the footprint. We passed the sutures diagonally through the supraspinatus,  infraspinatus complex to reduce the reverse all configuration tear. We  did that and could see there was in fact irreparable tear, so we did not  need the balloon spacer. Two additional anchors were placed a total of  four for the supraspinatus and infraspinatus. The 6.5 anchor was used  as a backup because one 5.5 anchor pulled out through softer bone. We  used 10 sutures to carry out a repair so not all the triple loaded  anchors that all three sutures passed. We were very satisfied with our  repair with spacing of the sutures every 4 or 5 mm right at the muscle  tendon junction. We had very nice repair. The lateral and the  footprint was exposed.   We loaded to extend the repair and promote  healing with a collagen patch graft. We documented our work  arthroscopically and then we deployed the Regeneten patch through the  lateral portal while viewing posteriorly. We fixed it with tendon and  muscle tendon junction using seven PLLA staples and the bone using two  PEEK staples anterolateral and posterolateral to complete the repair. We documented our work with arthroscopic images. It should be noted  that we removed multiple retained sutures from prior repair and these  were Ethibond sutures. Again, the anchor was left alone because it  would have required trephining the anchor out which would create a   large bone window. We irrigated the subacromial space and withdrew all  arthroscopic instruments. We closed the arthroscopic portals, low  anterior, high anterior, posterolateral and then three standard portals,  anterior, lateral, and posterior. These were all closed using 4-0  Monocryl subcuticular closure and Prineo dressing. Sterile compressive  dressing was applied. The arm was placed in a padded soft brace. The  patient was repositioned in supine position before this and promptly  awakened from anesthesia having tolerated the procedure well and taken  from the operating room to the recovery room in satisfactory condition. PLAN:  The patient will be discharged on oral analgesics with  instructions to begin outpatient physical therapy and follow up in the  office in one week as an outpatient.         Carlin Rogers MD    D: 06/13/2022 14:31:41       T: 06/13/2022 22:43:46     [email or web address]  Job#: [id number]     Doc#: [id number]    CC:

## 2022-06-14 NOTE — TELEPHONE ENCOUNTER
Other PATIENT'S SISTER CALLED BACK SHE IS NOW ASKING THAT JOSE CARLOS CALL HER BACK. STATES THAT PATIENT'S FINGERS ARE TINGLING.  PLS CALL TO A\DVISE [phone]

## 2022-06-14 NOTE — TELEPHONE ENCOUNTER
General Question     Subject: PATIENT SISTER HAS QUESTIONS REGARDING PAIN MANAGEMENT. WILL LIKE A CALL BACK FROM DR. AMOR OR PA SUSHMA ONLY. DOES NOT WANT TO SPEAK WITH A NURSE. PLEASE ADVISE.   Patient Elvia Watson  Contact Number: [phone]

## 2022-06-15 ENCOUNTER — TELEPHONE (OUTPATIENT)
Dept: ORTHOPEDIC SURGERY | Age: 67
End: 2022-06-15

## 2022-06-15 NOTE — TELEPHONE ENCOUNTER
General Question     Subject: PT'S SISTER/KAY CALLED IN STATING PT STARTED WITH WRIST/HAND NUMBNESS. PLEASE CALL TO DISCUSS.   Patient and /or Facility Request: Mariana Hardin  Contact Number: Castro Gilbert [phone]

## 2022-06-22 ENCOUNTER — OFFICE VISIT (OUTPATIENT)
Dept: ORTHOPEDIC SURGERY | Age: 67
End: 2022-06-22

## 2022-06-22 VITALS — HEIGHT: 63 IN | BODY MASS INDEX: 19.49 KG/M2 | WEIGHT: 110 LBS

## 2022-06-22 DIAGNOSIS — Z98.890 S/P ROTATOR CUFF SURGERY: Primary | ICD-10-CM

## 2022-06-22 PROCEDURE — 99024 POSTOP FOLLOW-UP VISIT: CPT | Performed by: ORTHOPAEDIC SURGERY

## 2022-06-22 NOTE — PROGRESS NOTES
Physical History of Present Illness:  Wing Camargo is a 69 y.o. female who presents for a post operative visit. The patient underwent a right revision rotator cuff repair with collagen patch augmentation on 6/13/2022 by Dr. True Saini. Overall she is doing okay and feels that their pain is well controlled with current pain medications. she has been compliant with wearing the UltraSling brace at all times. She reports the block took a while to wear off but the hand is back to normal now. The patient denies any fevers, chills, numbness, tingling, and shortness of breath. Medical History:  Patient's medications, allergies, past medical, surgical, social and family histories were reviewed and updated as appropriate. No notes on file    Review of Systems  A 14 point review of systems was completed by the patient is available in the media section of the scanned medical record and was reviewed on 6/22/2022. Vital Signs: There were no vitals filed for this visit. General/Appearance: Alert and oriented and in no apparent distress. Skin:  There are no skin lesions, cellulitis, or extreme edema. The patient has warm and well-perfused Bilateral upper extremities with brisk capillary refill.      right Shoulder Exam:    Inspection: right shoulder incision that is clean, dry and intact and well approximated. The Prineo dressing is still in place. Mild ecchymosis and swelling are present as can be expected. There is no erythema, drainage or other signs of infection    Palpation:  No crepitus to gentle motion    Active Range of Motion: Deferred    Passive Range of Motion:  Tolerated pendulum movement. Strength:  Deferred    Special Tests:  Deferred. Neurovascular: Sensation to light touch is intact, no motor deficits, palpable radial pulses 2+    Radiology:     Plain radiographs not obtained.        Assessment :  Ms. Wing Camargo is a 69 y.o. patient underwent a right revision rotator cuff repair with collagen patch augmentation on 6/13/2022. Impression:  Encounter Diagnosis   Name Primary?  S/P rotator cuff surgery Yes       Office Procedures:  Orders Placed This Encounter   Procedures   [facility]     Referral Priority:   Routine     Referral Type:   Eval and Treat     Referral Reason:   Specialty Services Required     Requested Specialty:   Physical Therapist     Number of Visits Requested:   1       Treatment Plan:    Overall, Denise Disla is doing well and she is off to a great start. Her shoulder pain is well-controlled. We recommend that she wear the UltraSling brace at all times with the exception of clothing, bathing of physical therapy. The patient was told that she is restricted from driving for at least 3 weeks postop. We will give a print out of their physical therapy order. All of her questions were fully answered today. We would like to see her back in 2 weeks for follow-up visit. 11:06 AM      Lieutenant Jason PA-C  Orthopaedic Sports Medicine Physician Assistant    During this examination, Lieutenant Jason TANG PA-C, functioned as a scribe for Dr. Connie Saavedra. This dictation was performed with a verbal recognition program (DRAGON) and it was checked for errors. It is possible that there are still dictated errors within this office note. If so, please bring any errors to my attention for an addendum. All efforts were made to ensure that this office note is accurate.  _______________  I, Dr. Connie Saavedra, personally performed the services described in this documentation as described by Lieutenant Jason PA-C in my presence, and it is both accurate and complete. Corrie Albright MD, PhD  6/22/2022

## 2022-06-30 ENCOUNTER — HOSPITAL ENCOUNTER (OUTPATIENT)
Dept: PHYSICAL THERAPY | Age: 67
Setting detail: THERAPIES SERIES
Discharge: HOME | End: 2022-06-30
Payer: MEDICARE

## 2022-06-30 PROCEDURE — 97110 THERAPEUTIC EXERCISES: CPT

## 2022-06-30 PROCEDURE — 97140 MANUAL THERAPY 1/> REGIONS: CPT

## 2022-06-30 PROCEDURE — 97161 PT EVAL LOW COMPLEX 20 MIN: CPT

## 2022-06-30 NOTE — FLOWSHEET NOTE
(71530)     PROM of shoulder, light joint mobs for comfort 15'                     HEP instruction: Access Code: Emily Escalante: [email or web address]  Date: 06/30/2022  Prepared by: Birdie Harada    Exercises  Seated Scapular Retraction - 3-4 x daily - 7 x weekly - 1 sets - 20 reps - 2-3 hold  Seated Shoulder Shrugs - 3-4 x daily - 7 x weekly - 1 sets - 20 reps - 2-3 hold  Standing Horizontal Shoulder Pendulum Supported with Arm Bent - 3-4 x daily - 7 x weekly - 1 sets - 20 reps  Standing Circular Shoulder Pendulum Supported with Arm Bent - 3-4 x daily - 7 x weekly - 1 sets - 20 reps  Standing Shoulder and Trunk Flexion at Table - 3-4 x daily - 7 x weekly - 1 sets - 10 reps - 5 hold    Therapeutic Exercise and NMR EXR  [x] (79139) Provided verbal/tactile cueing for activities related to strengthening, flexibility, endurance, ROM  for improvements in scapular, scapulothoracic and UE control with self care, reaching, carrying, lifting, house/yardwork, driving/computer work.    [] (64088) Provided verbal/tactile cueing for activities related to improving balance, coordination, kinesthetic sense, posture, motor skill, proprioception  to assist with  scapular, scapulothoracic and UE control with self care, reaching, carrying, lifting, house/yardwork, driving/computer work. Therapeutic Activities:    [x] (19663 or 48144) Provided verbal/tactile cueing for activities related to improving balance, coordination, kinesthetic sense, posture, motor skill, proprioception and motor activation to allow for proper function of scapular, scapulothoracic and UE control with self care, carrying, lifting, driving/computer work.      Home Exercise Program:    [x] (34633) Reviewed/Progressed HEP activities related to strengthening, flexibility, endurance, ROM of scapular, scapulothoracic and UE control with self care, reaching, carrying, lifting, house/yardwork, driving/computer work  [] (07363) Reviewed/Progressed HEP activities related to improving balance, coordination, kinesthetic sense, posture, motor skill, proprioception of scapular, scapulothoracic and UE control with self care, reaching, carrying, lifting, house/yardwork, driving/computer work      Manual Treatments:  PROM / STM / Oscillations-Mobs:  G-I, II, III, IV (PA's, Inf., Post.)  [x] (94767) Provided manual therapy to mobilize soft tissue/joints of cervical/CT, scapular GHJ and UE for the purpose of modulating pain, promoting relaxation,  increasing ROM, reducing/eliminating soft tissue swelling/inflammation/restriction, improving soft tissue extensibility and allowing for proper ROM for normal function with self care, reaching, carrying, lifting, house/yardwork, driving/computer work    Modalities:      Charges  Timed Code Treatment Minutes: 40   Total Treatment Minutes: 50     [x] EVAL (LOW) 50433   [] EVAL (MOD) 55676   [] EVAL (HIGH) 43498   [] RE-EVAL     [x] GN(39337) x 2    [] IONTO  [] NMR (20384) x     [] VASO   [x] Manual (32594) x 1     [] Other:  [] TA x      [] Mech Traction (28770)  [] ES(attended) (59964)      [] ES (un) (11927):     GOALS:  Patient stated goal: To get fully recovered from surgery and back to exercising. [] Progressing: [] Met: [] Not Met: [] Adjusted    Therapist goals for Patient:   Short Term Goals: To be achieved in: 2 weeks  1. Independent in HEP and progression per patient tolerance, in order to prevent re-injury. [] Progressing: [] Met: [] Not Met: [] Adjusted  2. Patient will have a decrease in pain to facilitate improvement in movement, function, and ADLs as indicated by Functional Deficits. [] Progressing: [] Met: [] Not Met: [] Adjusted    Long Term Goals: To be achieved in: 12 weeks  1. Disability index score of 46 or more per FOTO to assist with reaching prior level of function. [] Progressing: [] Met: [] Not Met: [] Adjusted  2.  Patient will demonstrate increased AROM to 150 degrees of forward flexion to allow for proper joint functioning as indicated by patients Functional Deficits. [] Progressing: [] Met: [] Not Met: [] Adjusted  3. Patient will demonstrate an increase in Strength to 4+/5 globally without pain to allow for proper functional mobility as indicated by patients Functional Deficits. [] Progressing: [] Met: [] Not Met: [] Adjusted  4. Patient will return to all ADL's and workout related functional activities without increased symptoms or restriction. [] Progressing: [] Met: [] Not Met: [] Adjusted    ASSESSMENT:  See eval    Overall Progression Towards Functional goals/ Treatment Progress Update:  [] Patient is progressing as expected towards functional goals listed. [] Progression is slowed due to complexities/Impairments listed. [] Progression has been slowed due to co-morbidities. [x] Plan just implemented, too soon to assess goals progression <30days   [] Goals require adjustment due to lack of progress  [] Patient is not progressing as expected and requires additional follow up with physician  [] Other    Prognosis for POC: [x] Good [] Fair  [] Poor      Patient requires continued skilled intervention: [x] Yes  [] No    Treatment/Activity Tolerance:  [x] Patient able to complete treatment  [] Patient limited by fatigue  [] Patient limited by pain    [] Patient limited by other medical complications  [] Other:     PLAN: See eval  [] Continue per plan of care [] Alter current plan (see comments above)  [x] Plan of care initiated [] Hold pending MD visit [] Discharge      Electronically signed by:  Cristian Sharif PT    Note: If patient does not return for scheduled/ recommended follow up visits, this note will serve as a discharge from care along with most recent update on progress.

## 2022-06-30 NOTE — PLAN OF CARE
The [facility] and [address],  [facility], [place] includes the [facility] [address]                  [address] [facility],[address]        [address], [address] [place] [address]  Phone: [phone]              Fax: [phone]     Physical Therapy Certification    Dear Xiomara Bazan MD,    We had the pleasure of evaluating the following patient for physical therapy services at [address] [place] [address]. A summary of our findings can be found in the initial assessment below. This includes our plan of care. If you have any questions or concerns regarding these findings, please do not hesitate to contact me at the office phone number checked above. Thank you for the referral.       Physician Signature:_______________________________Date:__________________  By signing above (or electronic signature), therapists plan is approved by physician    Patient: Jacky Chavez   : 1955   MRN: [id number]  Referring Physician: Xiomara Bazan MD      Evaluation Date: 2022      Medical Diagnosis Information:  Diagnosis: Z98.890 (ICD-10-CM) - S/P rotator cuff surgery   Treatment Diagnosis: R Shoulder Pain M25.511                                         Insurance information: PT Insurance Information: Medicare, 20%, no limit for visits    Precautions/ Contra-indications: s/pt R RTC  C-SSRS Triggered by Intake questionnaire (Past 2 wk assessment):   [x] No, Questionnaire did not trigger screening.   [] Yes, Patient intake triggered further evaluation      [] C-SSRS Screening completed  [] PCP notified via Plan of Care  [] Emergency services notified     Latex Allergy:  [x]NO      []YES  Preferred Language for Healthcare:   [x]English       []other:    SUBJECTIVE: Patient stated complaint: Patient reports that she is now 2.5 weeks s/p R RTC revision. She has had no pain since the surgery but has been diligent about wearing her sling.  She is not taking any post op medications at this point and is sleeping in her bed using a bolster to keep her slightly upright. Relevant Medical History: previous R RTC about 20 years ago  Functional Disability Index: FOTO: 23/100    Pain Scale: 0/10       Type: []Constant   []Intermittent  []Radiating []Localized []other:     Numbness/Tingling: None of note    Occupation/School: Retired    Living Status/Prior Level of Function: Independent with ADLs and IADLs,    OBJECTIVE:     PROM Left Right   Shoulder Flex NT 90 deg    Shoulder Abd NT 55 deg   Shoulder ER NT To neutral @ 0 deg abd   Shoulder IR NT To belly @ 0 deg abd                  Strength  Left Right   Shoulder Flex NT NT   Shoulder Scap NT NT   Shoulder ER NT NT   Shoulder IR NT NT               Reflexes/Sensation:    [x]Dermatomes/Myotomes intact    []Other:    Joint mobility: Good joint mobility noted in [facility] joint    Palpation: understandably TTP surrounding surgical sites    Bandages/Dressings/Incisions: good technique donning/doffing sling, incisions in signs of good healing with bandages intact    Gait: (include devices/WB status): WNL    Orthopedic Special Tests: none this visit                       [x] Patient history, allergies, meds reviewed. Medical chart reviewed. See intake form. Review Of Systems (ROS):  [x]Performed Review of systems (Integumentary, CardioPulmonary, Neurological) by intake and observation. Intake form has been scanned into medical record. Patient has been instructed to contact their primary care physician regarding ROS issues if not already being addressed at this time.       Co-morbidities/Complexities (which will affect course of rehabilitation): None reported  []None           Arthritic conditions   []Rheumatoid arthritis (M05.9)  []Osteoarthritis (M19.91)   Cardiovascular conditions   []Hypertension (I10)  []Hyperlipidemia (E78.5)  []Angina pectoris (I20)  []Atherosclerosis (I70)   Musculoskeletal conditions   []Disc pathology   []Congenital spine pathologies lifting, reaching, carrying tasks   [x]Reduced ability to tolerate impact through UE   [x]Reduced ability to reach behind back   [x]Reduced ability to  or hold objects   [x]Reduced ability to throw or toss an object   []other:    Participation Restrictions   [x]Reduced participation in self care activities   [x]Reduced participation in home management activities   [x]Reduced participation in work activities   [x]Reduced participation in social activities. [x]Reduced participation in sport/recreation activities. Classification:   [x]Signs/symptoms consistent with post-surgical status including decreased ROM, strength and function.   []Signs/symptoms consistent with joint sprain/strain   []Signs/symptoms consistent with shoulder impingement   []Signs/symptoms consistent with shoulder/elbow/wrist tendinopathy   []Signs/symptoms consistent with Rotator cuff tear   []Signs/symptoms consistent with labral tear   []Signs/symptoms consistent with postural dysfunction    []Signs/symptoms consistent with Glenohumeral IR Deficit - <45 degrees   []Signs/symptoms consistent with facet dysfunction of cervical/thoracic spine    []Signs/symptoms consistent with pathology which may benefit from Dry needling     []other:     Prognosis/Rehab Potential:      []Excellent   [x]Good    []Fair   []Poor    Tolerance of evaluation/treatment:    []Excellent   [x]Good    []Fair   []Poor    Physical Therapy Evaluation Complexity Justification  [x] A history of present problem with:  [] no personal factors and/or comorbidities that impact the plan of care;  [x]1-2 personal factors and/or comorbidities that impact the plan of care  []3 personal factors and/or comorbidities that impact the plan of care  [x] An examination of body systems using standardized tests and measures addressing any of the following: body structures and functions (impairments), activity limitations, and/or participation restrictions;:  [x] a total of 1-2 or more elements   [] a total of 3 or more elements   [] a total of 4 or more elements   [x] A clinical presentation with:  [x] stable and/or uncomplicated characteristics   [] evolving clinical presentation with changing characteristics  [] unstable and unpredictable characteristics;   [x] Clinical decision making of [x] low, [] moderate, [] high complexity using standardized patient assessment instrument and/or measurable assessment of functional outcome. [x] EVAL (LOW) 75844 (typically 20 minutes face-to-face)  [] EVAL (MOD) 48203 (typically 30 minutes face-to-face)  [] EVAL (HIGH) 00488 (typically 45 minutes face-to-face)  [] RE-EVAL       PLAN:  Frequency/Duration:  1-2 days per week for 12 Weeks:  INTERVENTIONS:  [x] Therapeutic exercise including: strength training, ROM, for Upper extremity and core   [x]  NMR activation and proprioception for UE, scap and Core   [x] Manual therapy as indicated for shoulder, scapula and spine to include: Dry Needling/IASTM, STM, PROM, Gr I-IV mobilizations, manipulation. [x] Modalities as needed that may include: thermal agents, E-stim, Biofeedback, US, iontophoresis as indicated  [x] Patient education on joint protection, postural re-education, activity modification, progression of HEP. HEP instruction: Access Code: [email or web address]  URL: [email or web address]  Date: 06/30/2022  Prepared by: Ariadne Akbar    Exercises  Seated Scapular Retraction - 3-4 x daily - 7 x weekly - 1 sets - 20 reps - 2-3 hold  Seated Shoulder Shrugs - 3-4 x daily - 7 x weekly - 1 sets - 20 reps - 2-3 hold  Standing Horizontal Shoulder Pendulum Supported with Arm Bent - 3-4 x daily - 7 x weekly - 1 sets - 20 reps  Standing Circular Shoulder Pendulum Supported with Arm Bent - 3-4 x daily - 7 x weekly - 1 sets - 20 reps  Standing Shoulder and Trunk Flexion at Table - 3-4 x daily - 7 x weekly - 1 sets - 10 reps - 5 hold      GOALS:  Patient stated goal: To get fully recovered from surgery and back to

## 2022-07-06 ENCOUNTER — HOSPITAL ENCOUNTER (OUTPATIENT)
Dept: PHYSICAL THERAPY | Age: 67
Setting detail: THERAPIES SERIES
Discharge: HOME | End: 2022-07-06
Payer: MEDICARE

## 2022-07-06 PROCEDURE — 97140 MANUAL THERAPY 1/> REGIONS: CPT | Performed by: PHYSICAL THERAPIST

## 2022-07-06 PROCEDURE — 97110 THERAPEUTIC EXERCISES: CPT | Performed by: PHYSICAL THERAPIST

## 2022-07-07 NOTE — FLOWSHEET NOTE
The [facility] and [address],  Sports Performance and Rehabilitation, [facility] [address]                  [address] [facility],[address]        Jose Antonio Rodriguez  Phone: [phone]              Fax: [phone]        Date:  2022    Patient Name:  Amy Oneal    :  1955  MRN: [id number]  Restrictions/Precautions: None of note   Medical/Treatment Diagnosis Information:  Diagnosis: Z98.890 (ICD-10-CM) - S/P rotator cuff surgery  Treatment Diagnosis: R Shoulder Pain S42.973  Insurance/Certification information:  PT Insurance Information: Medicare, 20%, no limit for visits  Physician Information:  Niecy Moe MD  Has the plan of care been signed (Y/N):        []  Yes  [x]  No     Date of Patient follow up with Physician: 22      Is this a Progress Report:     []  Yes  [x]  No        If Yes:  Date Range for reporting period:  Beginning 22  Ending    Progress report will be due (10 Rx or 30 days whichever is less): 48       Recertification will be due (POC Duration  / 90 days whichever is less): 22      Visit # Insurance Allowable Auth Required   In-person 2 BMN []  Yes []  No          Functional Scale: FOTO: 23100    Date assessed:  22       Number of Comorbidities:  [x]0     []1-2    []3+    Latex Allergy:  [x]NO      []YES  Preferred Language for Healthcare:   [x]English       []other:      Pain level:  0/10     SUBJECTIVE:  See eval    OBJECTIVE: See eval   Observation:    Test measurements:      RESTRICTIONS/PRECAUTIONS: s/p R RTC revision with collagen patch    Exercises/Interventions:     Therapeutic Ex (07711)/NMR re-education (47193) Sets/sec Notes/CUES   Seated Shrugs  Seated Scap Retraction x20  x20    Codman pendulums  Fwd/bck; s/s     Table flexion                                             Pt Ed: POC, HEP, Role of PT, not to push ROM to aggressively early on, safe donning/doffing 15'    Manual Intervention (58525)     stm of shoulder, light joint mobs for comfort 15'                     HEP instruction: Access Code: [employer]: [email or web address]  Date: 06/30/2022  Prepared by: Javid Rios    Exercises  Seated Scapular Retraction - 3-4 x daily - 7 x weekly - 1 sets - 20 reps - 2-3 hold  Seated Shoulder Shrugs - 3-4 x daily - 7 x weekly - 1 sets - 20 reps - 2-3 hold  Standing Horizontal Shoulder Pendulum Supported with Arm Bent - 3-4 x daily - 7 x weekly - 1 sets - 20 reps  Standing Circular Shoulder Pendulum Supported with Arm Bent - 3-4 x daily - 7 x weekly - 1 sets - 20 reps  Standing Shoulder and Trunk Flexion at Table - 3-4 x daily - 7 x weekly - 1 sets - 10 reps - 5 hold    Therapeutic Exercise and NMR EXR  [x] (55693) Provided verbal/tactile cueing for activities related to strengthening, flexibility, endurance, ROM  for improvements in scapular, scapulothoracic and UE control with self care, reaching, carrying, lifting, house/yardwork, driving/computer work.    [] (86324) Provided verbal/tactile cueing for activities related to improving balance, coordination, kinesthetic sense, posture, motor skill, proprioception  to assist with  scapular, scapulothoracic and UE control with self care, reaching, carrying, lifting, house/yardwork, driving/computer work. Therapeutic Activities:    [x] (61496 or 57283) Provided verbal/tactile cueing for activities related to improving balance, coordination, kinesthetic sense, posture, motor skill, proprioception and motor activation to allow for proper function of scapular, scapulothoracic and UE control with self care, carrying, lifting, driving/computer work.      Home Exercise Program:    [x] (69117) Reviewed/Progressed HEP activities related to strengthening, flexibility, endurance, ROM of scapular, scapulothoracic and UE control with self care, reaching, carrying, lifting, house/yardwork, driving/computer work  [] (63747) Reviewed/Progressed HEP activities related to improving balance, coordination, kinesthetic sense, posture, motor skill, proprioception of scapular, scapulothoracic and UE control with self care, reaching, carrying, lifting, house/yardwork, driving/computer work      Manual Treatments:  PROM / STM / Oscillations-Mobs:  G-I, II, III, IV (PA's, Inf., Post.)  [x] (58944) Provided manual therapy to mobilize soft tissue/joints of cervical/CT, scapular GHJ and UE for the purpose of modulating pain, promoting relaxation,  increasing ROM, reducing/eliminating soft tissue swelling/inflammation/restriction, improving soft tissue extensibility and allowing for proper ROM for normal function with self care, reaching, carrying, lifting, house/yardwork, driving/computer work    Modalities:      Charges  Timed Code Treatment Minutes: 35'   Total Treatment Minutes: 35'      [] EVAL (LOW) 40649   [] EVAL (MOD) 02565   [] EVAL (HIGH) 57997   [] RE-EVAL     [x] EI(22724) x 1    [] IONTO  [] NMR (77005) x     [] VASO   [x] Manual (33818) x 1     [] Other:  [] TA x      [] Mech Traction (40251)  [] ES(attended) (95426)      [] ES (un) (10175):     GOALS:  Patient stated goal: To get fully recovered from surgery and back to exercising. [] Progressing: [] Met: [] Not Met: [] Adjusted    Therapist goals for Patient:   Short Term Goals: To be achieved in: 2 weeks  1. Independent in HEP and progression per patient tolerance, in order to prevent re-injury. [x] Progressing: [] Met: [] Not Met: [] Adjusted  2. Patient will have a decrease in pain to facilitate improvement in movement, function, and ADLs as indicated by Functional Deficits. [x] Progressing: [] Met: [] Not Met: [] Adjusted    Long Term Goals: To be achieved in: 12 weeks  1. Disability index score of 46 or more per FOTO to assist with reaching prior level of function. [x] Progressing: [] Met: [] Not Met: [] Adjusted  2.  Patient will demonstrate increased AROM to 150 degrees of forward flexion to allow for proper joint functioning as indicated by patients Functional Deficits. [x] Progressing: [] Met: [] Not Met: [] Adjusted  3. Patient will demonstrate an increase in Strength to 4+/5 globally without pain to allow for proper functional mobility as indicated by patients Functional Deficits. [x] Progressing: [] Met: [] Not Met: [] Adjusted  4. Patient will return to all ADL's and workout related functional activities without increased symptoms or restriction. [x] Progressing: [] Met: [] Not Met: [] Adjusted    ASSESSMENT:  Tolerated well. .. Overall Progression Towards Functional goals/ Treatment Progress Update:  [] Patient is progressing as expected towards functional goals listed. [] Progression is slowed due to complexities/Impairments listed. [] Progression has been slowed due to co-morbidities. [x] Plan just implemented, too soon to assess goals progression <30days   [] Goals require adjustment due to lack of progress  [] Patient is not progressing as expected and requires additional follow up with physician  [] Other    Prognosis for POC: [x] Good [] Fair  [] Poor      Patient requires continued skilled intervention: [x] Yes  [] No    Treatment/Activity Tolerance:  [x] Patient able to complete treatment  [] Patient limited by fatigue  [] Patient limited by pain    [] Patient limited by other medical complications  [] Other:     PLAN: See eval  [x] Continue per plan of care [] Alter current plan (see comments above)  [x] Plan of care initiated [] Hold pending MD visit [] Discharge      Electronically signed by:  Gloria Espinoza PT, MPT     Note: If patient does not return for scheduled/ recommended follow up visits, this note will serve as a discharge from care along with most recent update on progress.

## 2022-07-12 ENCOUNTER — HOSPITAL ENCOUNTER (OUTPATIENT)
Dept: PHYSICAL THERAPY | Age: 67
Setting detail: THERAPIES SERIES
Discharge: HOME | End: 2022-07-12
Payer: MEDICARE

## 2022-07-12 ENCOUNTER — OFFICE VISIT (OUTPATIENT)
Dept: ORTHOPEDIC SURGERY | Age: 67
End: 2022-07-12

## 2022-07-12 VITALS — BODY MASS INDEX: 19.49 KG/M2 | HEIGHT: 63 IN | WEIGHT: 110 LBS

## 2022-07-12 DIAGNOSIS — Z98.890 S/P ROTATOR CUFF SURGERY: Primary | ICD-10-CM

## 2022-07-12 PROCEDURE — 99024 POSTOP FOLLOW-UP VISIT: CPT | Performed by: ORTHOPAEDIC SURGERY

## 2022-07-12 PROCEDURE — 97140 MANUAL THERAPY 1/> REGIONS: CPT

## 2022-07-12 PROCEDURE — 97110 THERAPEUTIC EXERCISES: CPT

## 2022-07-12 NOTE — FLOWSHEET NOTE
[facility] and [address],  Sports Performance and Rehabilitation, [facility] [address]199 [address]                  [address] [facility],5[address] Floor        Jose Antonio Rodriguez  Phone: [phone]              Fax: [phone]        Date:  2022    Patient Name:  Edward Dhaliwal    :  1955  MRN: [id number]  Restrictions/Precautions: None of note   Medical/Treatment Diagnosis Information:  Diagnosis: Z98.890 (ICD-10-CM) - S/P rotator cuff surgery  Treatment Diagnosis: R Shoulder Pain E05.346  Insurance/Certification information:  PT Insurance Information: Medicare, 20%, no limit for visits  Physician Information:  Moiz Olvera MD  Has the plan of care been signed (Y/N):        []  Yes  [x]  No     Date of Patient follow up with Physician: 22      Is this a Progress Report:     []  Yes  [x]  No        If Yes:  Date Range for reporting period:  Beginning 22  Ending    Progress report will be due (10 Rx or 30 days whichever is less):        Recertification will be due (POC Duration  / 90 days whichever is less): 22      Visit # Insurance Allowable Auth Required   In-person 3 BMN []  Yes []  No          Functional Scale: FOTO: 23/100    Date assessed:  22       Number of Comorbidities:  [x]0     []1-2    []3+    Latex Allergy:  [x]NO      []YES  Preferred Language for Healthcare:   [x]English       []other:      Pain level:  0/10     SUBJECTIVE:  Pt reports good follow up with MD, a little tight but that's to be expected. She is sleeping in bed with a bolster at home, was told that she can sleep flat or on that side as long as it is not uncomfortable.      OBJECTIVE:    Observation:    Test measurements: flexion to 115 with table slides, PROM ER to 20     RESTRICTIONS/PRECAUTIONS: s/p R RTC revision with collagen patch:   FWD AAROM to 90 (progress 10 deg per week till 140)  ER AAROM to 20 deg (progress 5 deg per week till 36)  Ok for rhomboid pinch and isometric abd    Exercises/Interventions:     Therapeutic Ex (34440)/NMR re-education (56094) Sets/sec Notes/CUES     Seated Scap Retraction   3x10   YTB      Table slides 10x3\" Highest was 115   scap clocks (3-9; 6-12; 1:30-7:30; 10:30-4:30) x10 ea Manual resistance for first two setups   Abduction isometrics 5x10\"                                  Pt Ed: POC, HEP, Role of PT, not to push ROM to aggressively early on, safe donning/doffing 15'    Manual Intervention (19946)     stm of shoulder, light joint mobs for comfort 15'                     HEP instruction: Access Code: [email or web address]  URL: [employer]/  Date: 06/30/2022  Prepared by: Cassie Hoff    Exercises  Seated Scapular Retraction - 3-4 x daily - 7 x weekly - 1 sets - 20 reps - 2-3 hold  Seated Shoulder Shrugs - 3-4 x daily - 7 x weekly - 1 sets - 20 reps - 2-3 hold  Standing Horizontal Shoulder Pendulum Supported with Arm Bent - 3-4 x daily - 7 x weekly - 1 sets - 20 reps  Standing Circular Shoulder Pendulum Supported with Arm Bent - 3-4 x daily - 7 x weekly - 1 sets - 20 reps  Standing Shoulder and Trunk Flexion at Table - 3-4 x daily - 7 x weekly - 1 sets - 10 reps - 5 hold    Therapeutic Exercise and NMR EXR  [x] (90596) Provided verbal/tactile cueing for activities related to strengthening, flexibility, endurance, ROM  for improvements in scapular, scapulothoracic and UE control with self care, reaching, carrying, lifting, house/yardwork, driving/computer work.    [] (11721) Provided verbal/tactile cueing for activities related to improving balance, coordination, kinesthetic sense, posture, motor skill, proprioception  to assist with  scapular, scapulothoracic and UE control with self care, reaching, carrying, lifting, house/yardwork, driving/computer work.     Therapeutic Activities:    [x] (72001 or 51555) Provided verbal/tactile cueing for activities related to improving balance, coordination, kinesthetic sense, posture, motor skill, Patient will have a decrease in pain to facilitate improvement in movement, function, and ADLs as indicated by Functional Deficits. [x] Progressing: [] Met: [] Not Met: [] Adjusted    Long Term Goals: To be achieved in: 12 weeks  1. Disability index score of 46 or more per FOTO to assist with reaching prior level of function. [x] Progressing: [] Met: [] Not Met: [] Adjusted  2. Patient will demonstrate increased AROM to 150 degrees of forward flexion to allow for proper joint functioning as indicated by patients Functional Deficits. [x] Progressing: [] Met: [] Not Met: [] Adjusted  3. Patient will demonstrate an increase in Strength to 4+/5 globally without pain to allow for proper functional mobility as indicated by patients Functional Deficits. [x] Progressing: [] Met: [] Not Met: [] Adjusted  4. Patient will return to all ADL's and workout related functional activities without increased symptoms or restriction. [x] Progressing: [] Met: [] Not Met: [] Adjusted    ASSESSMENT:  Rivka Russell tolerated all ROM and activation work well, reporting no pain. We did update HEP to include abduction isometrics within a pain free effort less than 100%. We also upgraded her table slides to include a slightly larger range. She would continue to benefit from skilled physical therapy to maximize her functional outcomes and progress towards goals. Overall Progression Towards Functional goals/ Treatment Progress Update:  [] Patient is progressing as expected towards functional goals listed. [] Progression is slowed due to complexities/Impairments listed. [] Progression has been slowed due to co-morbidities.   [x] Plan just implemented, too soon to assess goals progression <30days   [] Goals require adjustment due to lack of progress  [] Patient is not progressing as expected and requires additional follow up with physician  [] Other    Prognosis for POC: [x] Good [] Fair  [] Poor      Patient requires continued skilled intervention: [x] Yes  [] No    Treatment/Activity Tolerance:  [x] Patient able to complete treatment  [] Patient limited by fatigue  [] Patient limited by pain    [] Patient limited by other medical complications  [] Other:     PLAN: See eval  [x] Continue per plan of care [] Alter current plan (see comments above)  [x] Plan of care initiated [] Hold pending MD visit [] Discharge      Electronically signed by:  Amber Verma, PT, MPT     Note: If patient does not return for scheduled/ recommended follow up visits, this note will serve as a discharge from care along with most recent update on progress.

## 2022-07-12 NOTE — PROGRESS NOTES
History of Present Illness:  Kimberly Ramires is a pleasant 69 y.o. female who presents for a post operative visit. The patient underwent a right revision rotator cuff repair with collagen patch augmentation on 6/13/2022 She is 4 week postop. She has been compliant with wearing the UltraSling brace at all times. She has continued in physical therapy at the [facility] office primarily working with Ebenezer Jasmine. She states her pain is very minimal overall and she has not been taking the pain medications. She denies fevers, chills, numbness, tingling, and shortness of breath. Medical History:  Patient's medications, allergies, past medical, surgical, social and family histories were reviewed and updated as appropriate. No notes on file    Review of Systems  A 14 point review of systems was completed by the patient and is available in the media section of the scanned medical record and was reviewed on 7/12/2022. Vital Signs:  Vitals:       General/Appearance: Alert and oriented and in no apparent distress. Skin:  There are no skin lesions, cellulitis, or extreme edema. The patient has warm and well-perfused Bilateral upper extremities with brisk capillary refill. Right Shoulder Exam:    Inspection: Shoulder incision portals are clean, dry and intact and well approximated. The Prineo dressing is still in place. Mild ecchymosis and swelling are present as can be expected. There is no erythema, drainage or other signs of infection    Palpation:  No crepitus to gentle motion    Active Assisted Range of Motion: Deferred    Passive Range of Motion:  Tolerates gentle passive motion well    Strength:  Deferred    Special Tests:  Deferred. Neurovascular: Sensation to light touch is intact, no motor deficits, palpable radial pulses 2+    Radiology:     No new XR obtained at this time.           Assessment :  Ms. Kimberly Ramires is a pleasant 69 y.o. patient who underwent a right revision rotator cuff repair with collagen patch augmentation on 6/13/2022 She is 4 week postop. Impression:  Encounter Diagnosis   Name Primary?  S/P rotator cuff surgery Yes       Office Procedures:  Orders Placed This Encounter   Procedures    [facility] Physical Therapy- [facility] (Ortho & Sports performance)- [facility]     Referral Priority:   Routine     Referral Type:   Eval and Treat     Referral Reason:   Specialty Services Required     Requested Specialty:   Physical Therapist     Number of Visits Requested:   1       Treatment Plan:    Overall Tunde Escalera is doing well. She may begin to wean out of the Ultrasling brace when at home, but should continue to wear it when at risk. She may begin to drive but must remove the sling when doing so. Additionally, She must be off of pain medications during the day when driving. We recommend She continue in physical therapy at [facility] once per week for now. She has an upcoming vacation and she should continue her home exercises. We will progress her therapy at this time. All of her questions were fully answered today. We would like to see Tunde Escalera back in 3 weeks for follow-up visit. 7/12/2022  11:16 AM    Toney Nazario ATC  [employer] [address]    During this examination, I, Briseyda Kruger, functioned as a scribe for Dr. Jono Sims. The history taking and physical examination were performed by Dr. Anna Barba. All counseling during the appointment was performed between the patient and Dr. Anna Barba. 7/12/2022    _________________    Melissa Barajas.  Anna Barba MD, PhD  7/12/2022

## 2022-07-12 NOTE — LETTER
Shoulder Elbow Rehabilitation Referral    Patient Name: Tanja Cox      YOB: 1955    Diagnosis:   1. S/P rotator cuff surgery        Precautions: RTC    Post Op Instructions:  [] Continuous passive motion (CPM)  [x] Active Elbow range of motion  [] Exercise in plane of scapula   []  Strengthening     [] Pulley and instruction    [x] Home exercise program (copy to patient)   [x] Sling when arm at risk  [] Sling or brace at all times   [x] AAROM: Forward elevation to 90            [x] AAROM: External rotation to 20    [] Isometric external rotator strengthening [] AAROM: internal rotation: up the back  [x] Isometric abductor strengthening  [] AAROM: Internal abduction     [] Isometric internal rotator strengthening [] AAROM: cross-body adduction             Stretching:     Strengthening:  [] Four quadrant (FE, ER, IR, CBA)  [] Rotator cuff (ER, IR, Abd)  [] Forward Elevation    [] External Rotators     [] External Rotation    [] Internal Rotators  [] Internal Rotation: up/back   [] Abductors     [] Internal Rotation: supine in abduction  [] Flexors  [] Cross-body abduction    [] Extensors  [x] Pendulum (FE, Abd/Add, cw/ccw)  [x] Scapular Stabilizers   [] Wall-walking (FE, Abd)    [x] Shoulder shrugs     [] Table slides      [x] Rhomboid pinch  [] Elbow (flex, ext, pron, sup)    [] Lat.  Pull downs     [] Medial epicondylitis program    [] Forward punch   [] Lateral epicondylitis program    [] Internal rotators     [] Progressive resistive exercises  [] Bench Press        [] Bench press plus  Activities:     [] Lateral pull-downs  [] Rowing     [] Progressive two-hand supine press  [] Stepper/Exercise bike   [] Biceps: curls/supination  [] Swimming  [] Water exercises    Modalities: PRN    Return to Sport:  [] Ultrasound     [] Plyometrics  [] Iontophoresis     [] Rhythmic stabilization  [] Moist heat     [] Core strengthening   [] Massage     [] Sports specific program:   [x] Cryotherapy      [] Electrical stimulation     [] Paraffin  [] Whirlpool  [] TENS    [x] Home exercise program (copy to patient). Perform exercises for:   15     minutes    2-3      times/day  [x] Supervised physical therapy  Frequency: []  1x week  [x] 2x week  [] 3x week  [] Other:   Duration: [] 2 weeks   [] 4 weeks  [x] 6 weeks  [] Other:     Additional Instructions:   May progress Forward Elevation by 10 degrees weekly up to 140 and External Rotation 5 degrees weekly up to 40          Samer DELONTE Gonzalez MD, PhD

## 2022-07-18 ENCOUNTER — HOSPITAL ENCOUNTER (OUTPATIENT)
Dept: PHYSICAL THERAPY | Age: 67
Setting detail: THERAPIES SERIES
Discharge: HOME | End: 2022-07-18
Payer: MEDICARE

## 2022-07-18 PROCEDURE — 97110 THERAPEUTIC EXERCISES: CPT | Performed by: PHYSICAL THERAPIST

## 2022-07-18 PROCEDURE — 97140 MANUAL THERAPY 1/> REGIONS: CPT | Performed by: PHYSICAL THERAPIST

## 2022-07-19 ENCOUNTER — TELEPHONE (OUTPATIENT)
Dept: ORTHOPEDIC SURGERY | Age: 67
End: 2022-07-19

## 2022-07-19 NOTE — TELEPHONE ENCOUNTER
General Question     Subject: PATIENT IS WONDERING WHEN SHE CAN STOP WEARING THE SLING.   Patient: Paola Smith Number: [phone]

## 2022-07-21 ENCOUNTER — HOSPITAL ENCOUNTER (OUTPATIENT)
Dept: PHYSICAL THERAPY | Age: 67
Setting detail: THERAPIES SERIES
Discharge: HOME | End: 2022-07-21
Payer: MEDICARE

## 2022-07-21 PROCEDURE — 97110 THERAPEUTIC EXERCISES: CPT

## 2022-07-21 PROCEDURE — 97140 MANUAL THERAPY 1/> REGIONS: CPT

## 2022-07-21 NOTE — FLOWSHEET NOTE
[facility] and [address],  Sports Performance and Rehabilitation, [facility], [facility] [address]                  [address] [facility],[address]        Jose Antonio Rodriguez  Phone: [phone]              Fax: [phone]        Date:  2022    Patient Name:  Tanja Cox    :  1955  MRN: [id number]  Restrictions/Precautions: None of note   Medical/Treatment Diagnosis Information:  Diagnosis: Z98.890 (ICD-10-CM) - S/P rotator cuff surgery  Treatment Diagnosis: R Shoulder Pain M05.039  Insurance/Certification information:  PT Insurance Information: Medicare, 20%, no limit for visits  Physician Information:  Leonard Uribe MD  Has the plan of care been signed (Y/N):        [x]  Yes  []  No     Date of Patient follow up with Physician: 8/10/22      Is this a Progress Report:     []  Yes  [x]  No        If Yes:  Date Range for reporting period:  Beginning 22  Ending    Progress report will be due (10 Rx or 30 days whichever is less):        Recertification will be due (POC Duration  / 90 days whichever is less): 22      Visit # Insurance Allowable Auth Required   In-person 5 BMN []  Yes []  No          Functional Scale: FOTO: 23    Date assessed:  22       Number of Comorbidities:  [x]0     []1-2    []3+    Latex Allergy:  [x]NO      []YES  Preferred Language for Healthcare:   [x]English       []other:      Pain level:  0/10     SUBJECTIVE:  Pt reports that her shoulder is a little achy generally, nothing In particular is hurting though. Has been walking more without the sling on.      OBJECTIVE:   Observation:   Test measurements: NOT FORMALLY MEASURED THIS VISIT 22     RESTRICTIONS/PRECAUTIONS: DOS: 22 s/p R RTC revision with collagen patch:   FWD AAROM to 90 (progress 10 deg per week till 140)  ER AAROM to 20 deg (progress 5 deg per week till 40)  Ok for rhomboid pinch and isometric abd    Exercises/Interventions:     Therapeutic Ex (24315)/NMR re-education (10275) Sets/sec Notes/CUES   Seated Scap Retraction 3x10   YTB      Table slides  Standing table slides with SB 10x3\"  2x20 ea Highest was 115  Flexion + wipers   scap clocks (3-9; 6-12; 1:30-7:30; 10:30-4:30) 2x10 ea Manual resistance for first two setups   Abduction isometrics 5x10\"                                  Pt Ed: POC, HEP, Role of PT, not to push ROM to aggressively early on, safe donning/doffing 15'    Manual Intervention (99586)     stm of shoulder, light joint mobs for comfort 15'                     HEP instruction: Access Code: [email or web address]  URL: [email or web address]  Date: 06/30/2022  Prepared by: Dedrick Reina    Exercises  Seated Scapular Retraction - 3-4 x daily - 7 x weekly - 1 sets - 20 reps - 2-3 hold  Seated Shoulder Shrugs - 3-4 x daily - 7 x weekly - 1 sets - 20 reps - 2-3 hold  Standing Horizontal Shoulder Pendulum Supported with Arm Bent - 3-4 x daily - 7 x weekly - 1 sets - 20 reps  Standing Circular Shoulder Pendulum Supported with Arm Bent - 3-4 x daily - 7 x weekly - 1 sets - 20 reps  Standing Shoulder and Trunk Flexion at Table - 3-4 x daily - 7 x weekly - 1 sets - 10 reps - 5 hold    Therapeutic Exercise and NMR EXR  [x] (29244) Provided verbal/tactile cueing for activities related to strengthening, flexibility, endurance, ROM  for improvements in scapular, scapulothoracic and UE control with self care, reaching, carrying, lifting, house/yardwork, driving/computer work.    [] (07145) Provided verbal/tactile cueing for activities related to improving balance, coordination, kinesthetic sense, posture, motor skill, proprioception  to assist with  scapular, scapulothoracic and UE control with self care, reaching, carrying, lifting, house/yardwork, driving/computer work.     Therapeutic Activities:    [x] (84584 or 76405) Provided verbal/tactile cueing for activities related to improving balance, coordination, kinesthetic sense, posture, motor skill, proprioception and motor activation to allow for proper function of scapular, scapulothoracic and UE control with self care, carrying, lifting, driving/computer work. Home Exercise Program:    [x] (17045) Reviewed/Progressed HEP activities related to strengthening, flexibility, endurance, ROM of scapular, scapulothoracic and UE control with self care, reaching, carrying, lifting, house/yardwork, driving/computer work  [] (12118) Reviewed/Progressed HEP activities related to improving balance, coordination, kinesthetic sense, posture, motor skill, proprioception of scapular, scapulothoracic and UE control with self care, reaching, carrying, lifting, house/yardwork, driving/computer work      Manual Treatments:  PROM / STM / Oscillations-Mobs:  G-I, II, III, IV (PA's, Inf., Post.)  [x] (49696) Provided manual therapy to mobilize soft tissue/joints of cervical/CT, scapular GHJ and UE for the purpose of modulating pain, promoting relaxation,  increasing ROM, reducing/eliminating soft tissue swelling/inflammation/restriction, improving soft tissue extensibility and allowing for proper ROM for normal function with self care, reaching, carrying, lifting, house/yardwork, driving/computer work    Modalities:  Cervical CP on shoulder to end    Charges  Timed Code Treatment Minutes: 45'   Total Treatment Minutes: 54'      [] EVAL (LOW) 85978   [] EVAL (MOD) 19637   [] EVAL (HIGH) 36210   [] RE-EVAL     [x] LV(72614) x 2    [] IONTO  [] NMR (17920) x     [] VASO   [x] Manual (88974) x 1     [] Other:  [] TA x      [] Mech Traction (85464)  [] ES(attended) (08188)      [] ES (un) (61005):     GOALS:  Patient stated goal: To get fully recovered from surgery and back to exercising. [] Progressing: [] Met: [] Not Met: [] Adjusted    Therapist goals for Patient:   Short Term Goals: To be achieved in: 2 weeks  1. Independent in HEP and progression per patient tolerance, in order to prevent re-injury.    [x] Progressing: [] Met: [] Not Met: [] Adjusted  2. Patient will have a decrease in pain to facilitate improvement in movement, function, and ADLs as indicated by Functional Deficits. [x] Progressing: [] Met: [] Not Met: [] Adjusted    Long Term Goals: To be achieved in: 12 weeks  1. Disability index score of 46 or more per FOTO to assist with reaching prior level of function. [x] Progressing: [] Met: [] Not Met: [] Adjusted  2. Patient will demonstrate increased AROM to 150 degrees of forward flexion to allow for proper joint functioning as indicated by patients Functional Deficits. [x] Progressing: [] Met: [] Not Met: [] Adjusted  3. Patient will demonstrate an increase in Strength to 4+/5 globally without pain to allow for proper functional mobility as indicated by patients Functional Deficits. [x] Progressing: [] Met: [] Not Met: [] Adjusted  4. Patient will return to all ADL's and workout related functional activities without increased symptoms or restriction. [x] Progressing: [] Met: [] Not Met: [] Adjusted    ASSESSMENT:  Vitor Montez continued to tolerate all exercises without any reports of discomfort. She states that exercises help relieve any discomfort or achyeness in the shoulder. She would continue to benefit from skilled physical therapy to maximize her functional outcomes and progress towards goals. Overall Progression Towards Functional goals/ Treatment Progress Update:  [] Patient is progressing as expected towards functional goals listed. [] Progression is slowed due to complexities/Impairments listed. [] Progression has been slowed due to co-morbidities.   [x] Plan just implemented, too soon to assess goals progression <30days   [] Goals require adjustment due to lack of progress  [] Patient is not progressing as expected and requires additional follow up with physician  [] Other    Prognosis for POC: [x] Good [] Fair  [] Poor      Patient requires continued skilled intervention: [x] Yes  [] No    Treatment/Activity Tolerance:  [x] Patient able to complete treatment  [] Patient limited by fatigue  [] Patient limited by pain    [] Patient limited by other medical complications  [] Other:     PLAN: See eval  [x] Continue per plan of care [] Alter current plan (see comments above)  [x] Plan of care initiated [] Hold pending MD visit [] Discharge      Electronically signed by:  Radha Mary PT, MPT     Note: If patient does not return for scheduled/ recommended follow up visits, this note will serve as a discharge from care along with most recent update on progress.

## 2022-07-22 NOTE — FLOWSHEET NOTE
The [facility] and [address],  Sports Performance and Rehabilitation, [facility] [address]                  [address] [facility],5[address]        [place], [place] Antonio [place]  Phone:  [phone]  Fax: [phone]        Date:  2022    Patient Name:  Shannan Au    :  1955  MRN: [id number]  Restrictions/Precautions: None of note   Medical/Treatment Diagnosis Information:  Diagnosis: Z98.890 (ICD-10-CM) - S/P rotator cuff surgery  Treatment Diagnosis: R Shoulder Pain D30.493  Insurance/Certification information:  PT Insurance Information: Medicare, 20%, no limit for visits  Physician Information:  Nu Anton MD  Has the plan of care been signed (Y/N):        [x]  Yes  []  No     Date of Patient follow up with Physician: 8/10/22      Is this a Progress Report:     []  Yes  [x]  No        If Yes:  Date Range for reporting period:  Beginning 22  Ending    Progress report will be due (10 Rx or 30 days whichever is less): 80       Recertification will be due (POC Duration  / 90 days whichever is less): 22      Visit # Insurance Allowable Auth Required   In-person 6 BMN []  Yes []  No          Functional Scale: FOTO:     Date assessed:  22       Number of Comorbidities:  [x]0     []1-2    []3+    Latex Allergy:  [x]NO      []YES  Preferred Language for Healthcare:   [x]English       []other:      Pain level:  0-3/10     SUBJECTIVE:  \"doing well\"    OBJECTIVE:   Observation:   Test measurements: NOT FORMALLY MEASURED THIS VISIT 22     RESTRICTIONS/PRECAUTIONS: DOS: 22 s/p R RTC revision with collagen patch:   FWD AAROM to 90 (progress 10 deg per week till 140)  ER AAROM to 20 deg (progress 5 deg per week till 36)  Ok for rhomboid pinch and isometric abd    Exercises/Interventions:     Therapeutic Ex (13545)/NMR re-education (51433) Sets/sec Notes/CUES   Seated Scap Retraction 3x10   YTB      Table slides  Standing table slides with SB driving/computer work. Home Exercise Program:    [x] (44221) Reviewed/Progressed HEP activities related to strengthening, flexibility, endurance, ROM of scapular, scapulothoracic and UE control with self care, reaching, carrying, lifting, house/yardwork, driving/computer work  [] (73252) Reviewed/Progressed HEP activities related to improving balance, coordination, kinesthetic sense, posture, motor skill, proprioception of scapular, scapulothoracic and UE control with self care, reaching, carrying, lifting, house/yardwork, driving/computer work      Manual Treatments:  PROM / STM / Oscillations-Mobs:  G-I, II, III, IV (PA's, Inf., Post.)  [x] (47871) Provided manual therapy to mobilize soft tissue/joints of cervical/CT, scapular GHJ and UE for the purpose of modulating pain, promoting relaxation,  increasing ROM, reducing/eliminating soft tissue swelling/inflammation/restriction, improving soft tissue extensibility and allowing for proper ROM for normal function with self care, reaching, carrying, lifting, house/yardwork, driving/computer work    Modalities:  Cervical CP on shoulder to end    Charges  Timed Code Treatment Minutes: 45'   Total Treatment Minutes: 39'      [] EVAL (LOW) 18413   [] EVAL (MOD) 17262   [] EVAL (HIGH) 73369   [] RE-EVAL     [x] ID(09453) x 1    [] IONTO  [] NMR (95340) x     [] VASO   [x] Manual (25017) x 1     [] Other:  [] TA x      [] Mech Traction (90487)  [] ES(attended) (40532)      [] ES (un) (75354):     GOALS:  Patient stated goal: To get fully recovered from surgery and back to exercising. [] Progressing: [] Met: [] Not Met: [] Adjusted    Therapist goals for Patient:   Short Term Goals: To be achieved in: 2 weeks  1. Independent in HEP and progression per patient tolerance, in order to prevent re-injury. [x] Progressing: [] Met: [] Not Met: [] Adjusted  2.  Patient will have a decrease in pain to facilitate improvement in movement, function, and ADLs as indicated by Functional Deficits. [x] Progressing: [] Met: [] Not Met: [] Adjusted    Long Term Goals: To be achieved in: 12 weeks  1. Disability index score of 46 or more per FOTO to assist with reaching prior level of function. [x] Progressing: [] Met: [] Not Met: [] Adjusted  2. Patient will demonstrate increased AROM to 150 degrees of forward flexion to allow for proper joint functioning as indicated by patients Functional Deficits. [x] Progressing: [] Met: [] Not Met: [] Adjusted  3. Patient will demonstrate an increase in Strength to 4+/5 globally without pain to allow for proper functional mobility as indicated by patients Functional Deficits. [x] Progressing: [] Met: [] Not Met: [] Adjusted  4. Patient will return to all ADL's and workout related functional activities without increased symptoms or restriction. [x] Progressing: [] Met: [] Not Met: [] Adjusted    ASSESSMENT:  Angel Crain continued to tolerate all exercises without any reports of discomfort. She states that exercises help relieve any discomfort or achyeness in the shoulder. She would continue to benefit from skilled physical therapy to maximize her functional outcomes and progress towards goals. Overall Progression Towards Functional goals/ Treatment Progress Update:  [] Patient is progressing as expected towards functional goals listed. [] Progression is slowed due to complexities/Impairments listed. [] Progression has been slowed due to co-morbidities.   [x] Plan just implemented, too soon to assess goals progression <30days   [] Goals require adjustment due to lack of progress  [] Patient is not progressing as expected and requires additional follow up with physician  [] Other    Prognosis for POC: [x] Good [] Fair  [] Poor      Patient requires continued skilled intervention: [x] Yes  [] No    Treatment/Activity Tolerance:  [x] Patient able to complete treatment  [] Patient limited by fatigue  [] Patient limited by pain    [] Patient limited by other medical complications  [] Other:     PLAN: See eval  [x] Continue per plan of care [] Alter current plan (see comments above)  [] Plan of care initiated [] Hold pending MD visit [] Discharge      Electronically signed by:  Herlinda Mosley, PT, MPT     Note: If patient does not return for scheduled/ recommended follow up visits, this note will serve as a discharge from care along with most recent update on progress.

## 2022-07-25 ENCOUNTER — HOSPITAL ENCOUNTER (OUTPATIENT)
Dept: PHYSICAL THERAPY | Age: 67
Setting detail: THERAPIES SERIES
Discharge: HOME | End: 2022-07-25
Payer: MEDICARE

## 2022-07-25 PROCEDURE — 97140 MANUAL THERAPY 1/> REGIONS: CPT | Performed by: PHYSICAL THERAPIST

## 2022-07-25 PROCEDURE — 97110 THERAPEUTIC EXERCISES: CPT | Performed by: PHYSICAL THERAPIST

## 2022-07-26 NOTE — FLOWSHEET NOTE
The [facility] and [address],  Sports Performance and Rehabilitation, [facility], [facility] [address]  Phone:   Fax: [phone]        Date: 2022    Patient Name:  Shannan Au    :  1955  MRN: [id number]  Restrictions/Precautions: None of note   Medical/Treatment Diagnosis Information:  Diagnosis: Z98.890 (ICD-10-CM) - S/P rotator cuff surgery  Treatment Diagnosis: R Shoulder Pain V20.283  Insurance/Certification information:  PT Insurance Information: Medicare, 20%, no limit for visits  Physician Information:  Nu Anton MD  Has the plan of care been signed (Y/N):        [x]  Yes  []  No     Date of Patient follow up with Physician: 8/10/22      Is this a Progress Report:     []  Yes  [x]  No        If Yes:  Date Range for reporting period:  Beginning 22  Ending    Progress report will be due (10 Rx or 30 days whichever is less):        Recertification will be due (POC Duration  / 90 days whichever is less): 22      Visit # Insurance Allowable Auth Required   In-person 6 BMN []  Yes []  No          Functional Scale: FOTO:     Date assessed:  22       Number of Comorbidities:  [x]0     []1-2    []3+    Latex Allergy:  [x]NO      []YES  Preferred Language for Healthcare:   [x]English       []other:      Pain level:  0/10     SUBJECTIVE:  \"just a bit achy. .. nothing real bad. .  walking more for sure\"    OBJECTIVE:   Observation: meeting rom goals   Test measurements: NOT FORMALLY MEASURED THIS VISIT 22     RESTRICTIONS/PRECAUTIONS: DOS: 22 s/p R RTC revision with collagen patch:   FWD AAROM to 90 (progress 10 deg per week till 140)  ER AAROM to 20 deg (progress 5 deg per week till 40)  Ok for rhomboid pinch and isometric abd    Exercises/Interventions:     Therapeutic Ex (15335)/NMR re-education (21001) Sets/sec Notes/CUES   Seated Scap Retraction 3x10   YTB Table slides  Standing table slides with SB 10x3\"  2x20 ea Highest was 115  Flexion + wipers   scap clocks (3-9; 6-12; 1:30-7:30; 10:30-4:30) 2x10 ea Manual resistance for first two setups   Abduction isometrics 5x10\"                                  Pt Ed: POC, HEP, Role of PT, not to push ROM to aggressively early on, safe donning/doffing Manual Intervention ([address] [facility])     stm of shoulder, light joint mobs for comfort 25'                     HEP instruction: Access Code: [email or web address]  URL: [email or web address]  Date: 06/30/2022  Prepared by: Madison Sarah    Exercises  Seated Scapular Retraction - 3-4 x daily - 7 x weekly - 1 sets - 20 reps - 2-3 hold  Seated Shoulder Shrugs - 3-4 x daily - 7 x weekly - 1 sets - 20 reps - 2-3 hold  Standing Horizontal Shoulder Pendulum Supported with Arm Bent - 3-4 x daily - 7 x weekly - 1 sets - 20 reps  Standing Circular Shoulder Pendulum Supported with Arm Bent - 3-4 x daily - 7 x weekly - 1 sets - 20 reps  Standing Shoulder and Trunk Flexion at Table - 3-4 x daily - 7 x weekly - 1 sets - 10 reps - 5 hold    Therapeutic Exercise and NMR EXR  [x] (21258) Provided verbal/tactile cueing for activities related to strengthening, flexibility, endurance, ROM  for improvements in scapular, scapulothoracic and UE control with self care, reaching, carrying, lifting, house/yardwork, driving/computer work.    [] (79157) Provided verbal/tactile cueing for activities related to improving balance, coordination, kinesthetic sense, posture, motor skill, proprioception  to assist with  scapular, scapulothoracic and UE control with self care, reaching, carrying, lifting, house/yardwork, driving/computer work.     Therapeutic Activities:    [x] (24694 or 60738) Provided verbal/tactile cueing for activities related to improving balance, coordination, kinesthetic sense, posture, motor skill, proprioception and motor activation to allow for proper function of scapular, scapulothoracic and UE control with self care, carrying, lifting, driving/computer work. Home Exercise Program:    [x] (44081) Reviewed/Progressed HEP activities related to strengthening, flexibility, endurance, ROM of scapular, scapulothoracic and UE control with self care, reaching, carrying, lifting, house/yardwork, driving/computer work  [] (65647) Reviewed/Progressed HEP activities related to improving balance, coordination, kinesthetic sense, posture, motor skill, proprioception of scapular, scapulothoracic and UE control with self care, reaching, carrying, lifting, house/yardwork, driving/computer work      Manual Treatments:  PROM / STM / Oscillations-Mobs:  G-I, II, III, IV (PA's, Inf., Post.)  [x] (39315) Provided manual therapy to mobilize soft tissue/joints of cervical/CT, scapular GHJ and UE for the purpose of modulating pain, promoting relaxation,  increasing ROM, reducing/eliminating soft tissue swelling/inflammation/restriction, improving soft tissue extensibility and allowing for proper ROM for normal function with self care, reaching, carrying, lifting, house/yardwork, driving/computer work    Modalities:  Cervical CP on shoulder to end    Charges  Timed Code Treatment Minutes: 45'   Total Treatment Minutes: 54'      [] EVAL (LOW) 15838   [] EVAL (MOD) 55540   [] EVAL (HIGH) 15608   [] RE-EVAL     [x] CU(79543) x 2    [] IONTO  [] NMR (42213) x     [] VASO   [x] Manual (11369) x 1     [] Other:  [] TA x      [] Mech Traction (55444)  [] ES(attended) (02547)      [] ES (un) (44900):     GOALS:  Patient stated goal: To get fully recovered from surgery and back to exercising. [] Progressing: [] Met: [] Not Met: [] Adjusted    Therapist goals for Patient:   Short Term Goals: To be achieved in: 2 weeks  1. Independent in HEP and progression per patient tolerance, in order to prevent re-injury. [x] Progressing: [] Met: [] Not Met: [] Adjusted  2.  Patient will have a decrease in pain to facilitate improvement in movement, function, and ADLs as indicated by Functional Deficits. [x] Progressing: [] Met: [] Not Met: [] Adjusted    Long Term Goals: To be achieved in: 12 weeks  1. Disability index score of 46 or more per FOTO to assist with reaching prior level of function. [x] Progressing: [] Met: [] Not Met: [] Adjusted  2. Patient will demonstrate increased AROM to 150 degrees of forward flexion to allow for proper joint functioning as indicated by patients Functional Deficits. [x] Progressing: [] Met: [] Not Met: [] Adjusted  3. Patient will demonstrate an increase in Strength to 4+/5 globally without pain to allow for proper functional mobility as indicated by patients Functional Deficits. [x] Progressing: [] Met: [] Not Met: [] Adjusted  4. Patient will return to all ADL's and workout related functional activities without increased symptoms or restriction. [x] Progressing: [] Met: [] Not Met: [] Adjusted    ASSESSMENT:  corrected patient abduction isometric to focus on keeping shoulder at 0 degrees abduction. Overall Progression Towards Functional goals/ Treatment Progress Update:  [] Patient is progressing as expected towards functional goals listed. [] Progression is slowed due to complexities/Impairments listed. [] Progression has been slowed due to co-morbidities.   [x] Plan just implemented, too soon to assess goals progression <30days   [] Goals require adjustment due to lack of progress  [] Patient is not progressing as expected and requires additional follow up with physician  [] Other    Prognosis for POC: [x] Good [] Fair  [] Poor      Patient requires continued skilled intervention: [x] Yes  [] No    Treatment/Activity Tolerance:  [x] Patient able to complete treatment  [] Patient limited by fatigue  [] Patient limited by pain    [] Patient limited by other medical complications  [] Other:     PLAN: See eval  [x] Continue per plan of care [] Alter current plan (see comments above)  [] Plan of care initiated [] Hold pending MD visit [] Discharge      Electronically signed by:  Jorge Shields PT, MPT     Note: If patient does not return for scheduled/ recommended follow up visits, this note will serve as a discharge from care along with most recent update on progress.

## 2022-07-29 ENCOUNTER — HOSPITAL ENCOUNTER (OUTPATIENT)
Dept: PHYSICAL THERAPY | Age: 67
Setting detail: THERAPIES SERIES
Discharge: HOME | End: 2022-07-29
Payer: MEDICARE

## 2022-07-29 PROCEDURE — 97140 MANUAL THERAPY 1/> REGIONS: CPT | Performed by: PHYSICAL THERAPIST

## 2022-07-29 PROCEDURE — 97110 THERAPEUTIC EXERCISES: CPT | Performed by: PHYSICAL THERAPIST

## 2022-08-01 ENCOUNTER — HOSPITAL ENCOUNTER (OUTPATIENT)
Dept: PHYSICAL THERAPY | Age: 67
Setting detail: THERAPIES SERIES
Discharge: HOME | End: 2022-08-01
Payer: MEDICARE

## 2022-08-01 PROCEDURE — 97140 MANUAL THERAPY 1/> REGIONS: CPT | Performed by: PHYSICAL THERAPIST

## 2022-08-01 PROCEDURE — 97110 THERAPEUTIC EXERCISES: CPT | Performed by: PHYSICAL THERAPIST

## 2022-08-01 NOTE — FLOWSHEET NOTE
The [facility] and [address],  Sports Performance and Rehabilitation, [facility] [address]199 [address]  Phone:   Fax: [phone]        Date: 2022    Patient Name:  Prema Carroll    :  1955  MRN: [id number]  Restrictions/Precautions: None of note   Medical/Treatment Diagnosis Information:  Diagnosis: Z98.890 (ICD-10-CM) - S/P rotator cuff surgery  Treatment Diagnosis: R Shoulder Pain F59.819  Insurance/Certification information:  PT Insurance Information: Medicare, 20%, no limit for visits  Physician Information:  Dilan Mcgrath MD  Has the plan of care been signed (Y/N):        [x]  Yes  []  No     Date of Patient follow up with Physician: 8/10/22      Is this a Progress Report:     []  Yes  [x]  No        If Yes:  Date Range for reporting period:  Beginning 22  Ending    Progress report will be due (10 Rx or 30 days whichever is less): 24       Recertification will be due (POC Duration  / 90 days whichever is less): 22      Visit # Insurance Allowable Auth Required   In-person 7 BMN []  Yes []  No          Functional Scale: FOTO:     Date assessed:  22       Number of Comorbidities:  [x]0     []1-2    []3+    Latex Allergy:  [x]NO      []YES  Preferred Language for Healthcare:   [x]English       []other:      Pain level:  10     SUBJECTIVE:  \"doing better. Constance Chime Valencia Chime \"    OBJECTIVE:   Observation: meeting rom goals   Test measurements: NOT FORMALLY MEASURED THIS VISIT 22     RESTRICTIONS/PRECAUTIONS: DOS: 22 s/p R RTC revision with collagen patch:   FWD AAROM to 90 (progress 10 deg per week till 140)  ER AAROM to 20 deg (progress 5 deg per week till 40)  Ok for rhomboid pinch and isometric abd    Exercises/Interventions:     Therapeutic Ex (65598)/NMR re-education (41244) Sets/sec Notes/CUES   Seated Scap Retraction 3x10   YTB      Table slides  Standing table slides with SB 10x3\"  2x20 ea Highest was 115  Flexion + wipers   scap clocks (3-9; 6-12; 1:30-7:30; 10:30-4:30) 2x10 ea Manual resistance for first two setups   Abduction isometrics 5x10\"                                  Pt Ed: POC, HEP, Role of PT, not to push ROM to aggressively early on, safe donning/doffing Manual Intervention ([phone])     stm of shoulder, light joint mobs for comfort 25'                     HEP instruction: Access Code: [email or web address]  URL: [email or web address]  Date: 06/30/2022  Prepared by: Rc Rakes    Exercises  Seated Scapular Retraction - 3-4 x daily - 7 x weekly - 1 sets - 20 reps - 2-3 hold  Seated Shoulder Shrugs - 3-4 x daily - 7 x weekly - 1 sets - 20 reps - 2-3 hold  Standing Horizontal Shoulder Pendulum Supported with Arm Bent - 3-4 x daily - 7 x weekly - 1 sets - 20 reps  Standing Circular Shoulder Pendulum Supported with Arm Bent - 3-4 x daily - 7 x weekly - 1 sets - 20 reps  Standing Shoulder and Trunk Flexion at Table - 3-4 x daily - 7 x weekly - 1 sets - 10 reps - 5 hold    Therapeutic Exercise and NMR EXR  [x] (63257) Provided verbal/tactile cueing for activities related to strengthening, flexibility, endurance, ROM  for improvements in scapular, scapulothoracic and UE control with self care, reaching, carrying, lifting, house/yardwork, driving/computer work.    [] (27048) Provided verbal/tactile cueing for activities related to improving balance, coordination, kinesthetic sense, posture, motor skill, proprioception  to assist with  scapular, scapulothoracic and UE control with self care, reaching, carrying, lifting, house/yardwork, driving/computer work.     Therapeutic Activities:    [x] (41488 or 02127) Provided verbal/tactile cueing for activities related to improving balance, coordination, kinesthetic sense, posture, motor skill, proprioception and motor activation to allow for proper function of scapular, scapulothoracic and UE control with self care, carrying, lifting, driving/computer work. Home Exercise Program:    [x] (42003) Reviewed/Progressed HEP activities related to strengthening, flexibility, endurance, ROM of scapular, scapulothoracic and UE control with self care, reaching, carrying, lifting, house/yardwork, driving/computer work  [] (47024) Reviewed/Progressed HEP activities related to improving balance, coordination, kinesthetic sense, posture, motor skill, proprioception of scapular, scapulothoracic and UE control with self care, reaching, carrying, lifting, house/yardwork, driving/computer work      Manual Treatments:  PROM / STM / Oscillations-Mobs:  G-I, II, III, IV (PA's, Inf., Post.)  [x] (77136) Provided manual therapy to mobilize soft tissue/joints of cervical/CT, scapular GHJ and UE for the purpose of modulating pain, promoting relaxation,  increasing ROM, reducing/eliminating soft tissue swelling/inflammation/restriction, improving soft tissue extensibility and allowing for proper ROM for normal function with self care, reaching, carrying, lifting, house/yardwork, driving/computer work    Modalities:  Cervical CP on shoulder to end    Charges  Timed Code Treatment Minutes: 43'   Total Treatment Minutes: 37'     [] EVAL (LOW) 27814   [] EVAL (MOD) 75823   [] EVAL (HIGH) 55258   [] RE-EVAL     [x] YD(71326) x 1    [] IONTO  [] NMR (51094) x     [] VASO   [x] Manual (54961) x 2    [] Other:  [] TA x      [] Mech Traction (11492)  [] ES(attended) (09177)      [] ES (un) (73360):     GOALS:  Patient stated goal: To get fully recovered from surgery and back to exercising. [] Progressing: [] Met: [] Not Met: [] Adjusted    Therapist goals for Patient:   Short Term Goals: To be achieved in: 2 weeks  1. Independent in HEP and progression per patient tolerance, in order to prevent re-injury. [x] Progressing: [] Met: [] Not Met: [] Adjusted  2.  Patient will have a decrease in pain to facilitate improvement in movement, function, and ADLs as indicated by Functional Deficits. [x] Progressing: [] Met: [] Not Met: [] Adjusted    Long Term Goals: To be achieved in: 12 weeks  1. Disability index score of 46 or more per FOTO to assist with reaching prior level of function. [x] Progressing: [] Met: [] Not Met: [] Adjusted  2. Patient will demonstrate increased AROM to 150 degrees of forward flexion to allow for proper joint functioning as indicated by patients Functional Deficits. [x] Progressing: [] Met: [] Not Met: [] Adjusted  3. Patient will demonstrate an increase in Strength to 4+/5 globally without pain to allow for proper functional mobility as indicated by patients Functional Deficits. [x] Progressing: [] Met: [] Not Met: [] Adjusted  4. Patient will return to all ADL's and workout related functional activities without increased symptoms or restriction. [x] Progressing: [] Met: [] Not Met: [] Adjusted    ASSESSMENT:  corrected patient abduction isometric to focus on keeping shoulder at 0 degrees abduction. Overall Progression Towards Functional goals/ Treatment Progress Update:  [] Patient is progressing as expected towards functional goals listed. [] Progression is slowed due to complexities/Impairments listed. [] Progression has been slowed due to co-morbidities.   [x] Plan just implemented, too soon to assess goals progression <30days   [] Goals require adjustment due to lack of progress  [] Patient is not progressing as expected and requires additional follow up with physician  [] Other    Prognosis for POC: [x] Good [] Fair  [] Poor      Patient requires continued skilled intervention: [x] Yes  [] No    Treatment/Activity Tolerance:  [x] Patient able to complete treatment  [] Patient limited by fatigue  [] Patient limited by pain    [] Patient limited by other medical complications  [] Other:     PLAN: See eval  [x] Continue per plan of care [] Alter current plan (see comments above)  [] Plan of care initiated [] Hold pending MD visit [] Discharge      Electronically signed by:  Herlinda Mosley, PT, MPT     Note: If patient does not return for scheduled/ recommended follow up visits, this note will serve as a discharge from care along with most recent update on progress.

## 2022-08-04 ENCOUNTER — HOSPITAL ENCOUNTER (OUTPATIENT)
Dept: PHYSICAL THERAPY | Age: 67
Setting detail: THERAPIES SERIES
Discharge: HOME | End: 2022-08-04
Payer: MEDICARE

## 2022-08-04 PROCEDURE — 97140 MANUAL THERAPY 1/> REGIONS: CPT | Performed by: PHYSICAL THERAPIST

## 2022-08-04 PROCEDURE — 97110 THERAPEUTIC EXERCISES: CPT | Performed by: PHYSICAL THERAPIST

## 2022-08-04 NOTE — FLOWSHEET NOTE
[facility] and [address],  Sports Performance and Rehabilitation, [facility] [address]                  [address] [facility],5Th Floor        Jose Antonio Rodriguez  Phone:   Fax: [phone]        Date: 2022    Patient Name:  Tunde Escalera    :  1955  MRN: [id number]  Restrictions/Precautions: None of note   Medical/Treatment Diagnosis Information:  Diagnosis: Z98.890 (ICD-10-CM) - S/P rotator cuff surgery  Treatment Diagnosis: R Shoulder Pain S45.746  Insurance/Certification information:  PT Insurance Information: Medicare, 20%, no limit for visits  Physician Information:  Jono Sims MD  Has the plan of care been signed (Y/N):        []  Yes  [x]  No     Date of Patient follow up with Physician: 8/10/22      Is this a Progress Report:     []  Yes  [x]  No        If Yes:  Date Range for reporting period:  Beginning 22  Ending    Progress report will be due (10 Rx or 30 days whichever is less):        Recertification will be due (POC Duration  / 90 days whichever is less): 22      Visit # Insurance Allowable Auth Required   In-person 8 BMN []  Yes []  No          Functional Scale: FOTO:     Date assessed:  22       Number of Comorbidities:  [x]0     []1-2    []3+    Latex Allergy:  [x]NO      []YES  Preferred Language for Healthcare:   [x]English       []other:      Pain level:  10     SUBJECTIVE:  \"coming along\"    OBJECTIVE:   Observation: meeting rom goals   Test measurements: NOT FORMALLY MEASURED THIS VISIT 22     RESTRICTIONS/PRECAUTIONS: DOS: 22 s/p R RTC revision with collagen patch:   FWD AAROM to 90 (progress 10 deg per week till 140)  ER AAROM to 20 deg (progress 5 deg per week till 36)  Ok for rhomboid pinch and isometric abd    Exercises/Interventions:     Therapeutic Ex (57203)/NMR re-education (30267) Sets/sec Notes/CUES   Seated Scap Retraction 3x10   YTB      Table slides  Standing table slides with SB 10x3\"  2x20 ea Highest was 115  Flexion + wipers   scap clocks (3-9; 6-12; 1:30-7:30; 10:30-4:30) 2x10 ea Manual resistance for first two setups   Abduction isometrics 5x10\"                                  Pt Ed: POC, HEP, Role of PT, not to push ROM to aggressively early on, safe donning/doffing Manual Intervention ([phone])     stm of shoulder, light joint mobs for comfort 25'                     HEP instruction: Access Code: [email or web address]  URL: [email or web address]  Date: 06/30/2022  Prepared by: Afton Rakes    Exercises  Seated Scapular Retraction - 3-4 x daily - 7 x weekly - 1 sets - 20 reps - 2-3 hold  Seated Shoulder Shrugs - 3-4 x daily - 7 x weekly - 1 sets - 20 reps - 2-3 hold  Standing Horizontal Shoulder Pendulum Supported with Arm Bent - 3-4 x daily - 7 x weekly - 1 sets - 20 reps  Standing Circular Shoulder Pendulum Supported with Arm Bent - 3-4 x daily - 7 x weekly - 1 sets - 20 reps  Standing Shoulder and Trunk Flexion at Table - 3-4 x daily - 7 x weekly - 1 sets - 10 reps - 5 hold    Therapeutic Exercise and NMR EXR  [x] (56078) Provided verbal/tactile cueing for activities related to strengthening, flexibility, endurance, ROM  for improvements in scapular, scapulothoracic and UE control with self care, reaching, carrying, lifting, house/yardwork, driving/computer work.    [] (45055) Provided verbal/tactile cueing for activities related to improving balance, coordination, kinesthetic sense, posture, motor skill, proprioception  to assist with  scapular, scapulothoracic and UE control with self care, reaching, carrying, lifting, house/yardwork, driving/computer work.     Therapeutic Activities:    [x] (96619 or 32921) Provided verbal/tactile cueing for activities related to improving balance, coordination, kinesthetic sense, posture, motor skill, proprioception and motor activation to allow for proper function of scapular, scapulothoracic and UE control with self care, carrying, lifting, Functional Deficits. [x] Progressing: [] Met: [] Not Met: [] Adjusted    Long Term Goals: To be achieved in: 12 weeks  1. Disability index score of 46 or more per FOTO to assist with reaching prior level of function. [x] Progressing: [] Met: [] Not Met: [] Adjusted  2. Patient will demonstrate increased AROM to 150 degrees of forward flexion to allow for proper joint functioning as indicated by patients Functional Deficits. [x] Progressing: [] Met: [] Not Met: [] Adjusted  3. Patient will demonstrate an increase in Strength to 4+/5 globally without pain to allow for proper functional mobility as indicated by patients Functional Deficits. [x] Progressing: [] Met: [] Not Met: [] Adjusted  4. Patient will return to all ADL's and workout related functional activities without increased symptoms or restriction. [x] Progressing: [] Met: [] Not Met: [] Adjusted    ASSESSMENT:  corrected patient abduction isometric to focus on keeping shoulder at 0 degrees abduction. Overall Progression Towards Functional goals/ Treatment Progress Update:  [] Patient is progressing as expected towards functional goals listed. [] Progression is slowed due to complexities/Impairments listed. [] Progression has been slowed due to co-morbidities.   [x] Plan just implemented, too soon to assess goals progression <30days   [] Goals require adjustment due to lack of progress  [] Patient is not progressing as expected and requires additional follow up with physician  [] Other    Prognosis for POC: [x] Good [] Fair  [] Poor      Patient requires continued skilled intervention: [x] Yes  [] No    Treatment/Activity Tolerance:  [x] Patient able to complete treatment  [] Patient limited by fatigue  [] Patient limited by pain    [] Patient limited by other medical complications  [] Other:     PLAN: See eval  [x] Continue per plan of care [] Alter current plan (see comments above)  [] Plan of care initiated [] Hold pending MD visit [] Discharge      Electronically signed by:  Sara Wade PT, MPT     Note: If patient does not return for scheduled/ recommended follow up visits, this note will serve as a discharge from care along with most recent update on progress.

## 2022-08-07 NOTE — FLOWSHEET NOTE
[facility] and [address],  Sports Performance and Rehabilitation, [facility] [address]                  793 [facility],5[address] Floor        Jose Antonio Rodriguez  Phone:   Fax: [phone]        Date: 2022    Patient Name:  Jeanine Hernandez    :  1955  MRN: [id number]  Restrictions/Precautions: None of note   Medical/Treatment Diagnosis Information:  Diagnosis: Z98.890 (ICD-10-CM) - S/P rotator cuff surgery  Treatment Diagnosis: R Shoulder Pain C05.871  Insurance/Certification information:  PT Insurance Information: Medicare, 20%, no limit for visits  Physician Information:  Beryle Hau, MD  Has the plan of care been signed (Y/N):        []  Yes  [x]  No     Date of Patient follow up with Physician: 8/10/22      Is this a Progress Report:     []  Yes  [x]  No        If Yes:  Date Range for reporting period:  Beginning 22  Ending    Progress report will be due (10 Rx or 30 days whichever is less): 12       Recertification will be due (POC Duration  / 90 days whichever is less): 22      Visit # Insurance Allowable Auth Required   In-person 8 BMN []  Yes []  No          Functional Scale: FOTO:     Date assessed:  22       Number of Comorbidities:  [x]0     []1-2    []3+    Latex Allergy:  [x]NO      []YES  Preferred Language for Healthcare:   [x]English       []other:      Pain level:  nr/10     SUBJECTIVE:  \"I am feeling ok\"    OBJECTIVE:   Observation: meeting rom goals   Test measurements: NOT FORMALLY MEASURED THIS VISIT 22     RESTRICTIONS/PRECAUTIONS: DOS: 22 s/p R RTC revision with collagen patch:   FWD AAROM to 90 (progress 10 deg per week till 140)  ER AAROM to 20 deg (progress 5 deg per week till 36)  Ok for rhomboid pinch and isometric abd    Exercises/Interventions:     Therapeutic Ex (04360)/NMR re-education (23774) Sets/sec Notes/CUES   Seated Scap Retraction 3x10   YTB      Table slides  Standing table slides with SB 10x3\"  2x20 ea Highest was 115  Flexion + wipers   scap clocks (3-9; 6-12; 1:30-7:30; 10:30-4:30) 2x10 ea Manual resistance for first two setups   Abduction isometrics 5x10\"                                  Pt Ed: POC, HEP, Role of PT, not to push ROM to aggressively early on, safe donning/doffing Manual Intervention ([id number])     stm of shoulder, light joint mobs for comfort 25'                     HEP instruction: Access Code: [email or web address]  URL: [email or web address]  Date: 06/30/2022  Prepared by: Michael Hills    Exercises  Seated Scapular Retraction - 3-4 x daily - 7 x weekly - 1 sets - 20 reps - 2-3 hold  Seated Shoulder Shrugs - 3-4 x daily - 7 x weekly - 1 sets - 20 reps - 2-3 hold  Standing Horizontal Shoulder Pendulum Supported with Arm Bent - 3-4 x daily - 7 x weekly - 1 sets - 20 reps  Standing Circular Shoulder Pendulum Supported with Arm Bent - 3-4 x daily - 7 x weekly - 1 sets - 20 reps  Standing Shoulder and Trunk Flexion at Table - 3-4 x daily - 7 x weekly - 1 sets - 10 reps - 5 hold    Therapeutic Exercise and NMR EXR  [x] (40750) Provided verbal/tactile cueing for activities related to strengthening, flexibility, endurance, ROM  for improvements in scapular, scapulothoracic and UE control with self care, reaching, carrying, lifting, house/yardwork, driving/computer work.    [] (32079) Provided verbal/tactile cueing for activities related to improving balance, coordination, kinesthetic sense, posture, motor skill, proprioception  to assist with  scapular, scapulothoracic and UE control with self care, reaching, carrying, lifting, house/yardwork, driving/computer work.     Therapeutic Activities:    [x] (06660 or 30329) Provided verbal/tactile cueing for activities related to improving balance, coordination, kinesthetic sense, posture, motor skill, proprioception and motor activation to allow for proper function of scapular, scapulothoracic and UE control with self care, carrying, lifting, driving/computer work. Home Exercise Program:    [x] (95338) Reviewed/Progressed HEP activities related to strengthening, flexibility, endurance, ROM of scapular, scapulothoracic and UE control with self care, reaching, carrying, lifting, house/yardwork, driving/computer work  [] (37266) Reviewed/Progressed HEP activities related to improving balance, coordination, kinesthetic sense, posture, motor skill, proprioception of scapular, scapulothoracic and UE control with self care, reaching, carrying, lifting, house/yardwork, driving/computer work      Manual Treatments:  PROM / STM / Oscillations-Mobs:  G-I, II, III, IV (PA's, Inf., Post.)  [x] (30687) Provided manual therapy to mobilize soft tissue/joints of cervical/CT, scapular GHJ and UE for the purpose of modulating pain, promoting relaxation,  increasing ROM, reducing/eliminating soft tissue swelling/inflammation/restriction, improving soft tissue extensibility and allowing for proper ROM for normal function with self care, reaching, carrying, lifting, house/yardwork, driving/computer work    Modalities:  Cervical CP on shoulder to end    Charges  Timed Code Treatment Minutes: 43'   Total Treatment Minutes: 37'     [] EVAL (LOW) 20358   [] EVAL (MOD) 89605   [] EVAL (HIGH) 15409   [] RE-EVAL     [x] KD(39489) x 1    [] IONTO  [] NMR (07627) x     [] VASO   [x] Manual (54718) x 2    [] Other:  [] TA x      [] Mech Traction (48770)  [] ES(attended) (27020)      [] ES (un) (86605):     GOALS:  Patient stated goal: To get fully recovered from surgery and back to exercising. [] Progressing: [] Met: [] Not Met: [] Adjusted    Therapist goals for Patient:   Short Term Goals: To be achieved in: 2 weeks  1. Independent in HEP and progression per patient tolerance, in order to prevent re-injury. [x] Progressing: [] Met: [] Not Met: [] Adjusted  2.  Patient will have a decrease in pain to facilitate improvement in movement, function, and ADLs as indicated by Functional Deficits. [x] Progressing: [] Met: [] Not Met: [] Adjusted    Long Term Goals: To be achieved in: 12 weeks  1. Disability index score of 46 or more per FOTO to assist with reaching prior level of function. [x] Progressing: [] Met: [] Not Met: [] Adjusted  2. Patient will demonstrate increased AROM to 150 degrees of forward flexion to allow for proper joint functioning as indicated by patients Functional Deficits. [x] Progressing: [] Met: [] Not Met: [] Adjusted  3. Patient will demonstrate an increase in Strength to 4+/5 globally without pain to allow for proper functional mobility as indicated by patients Functional Deficits. [x] Progressing: [] Met: [] Not Met: [] Adjusted  4. Patient will return to all ADL's and workout related functional activities without increased symptoms or restriction. [x] Progressing: [] Met: [] Not Met: [] Adjusted    ASSESSMENT:  corrected patient abduction isometric to focus on keeping shoulder at 0 degrees abduction. Overall Progression Towards Functional goals/ Treatment Progress Update:  [] Patient is progressing as expected towards functional goals listed. [] Progression is slowed due to complexities/Impairments listed. [] Progression has been slowed due to co-morbidities.   [x] Plan just implemented, too soon to assess goals progression <30days   [] Goals require adjustment due to lack of progress  [] Patient is not progressing as expected and requires additional follow up with physician  [] Other    Prognosis for POC: [x] Good [] Fair  [] Poor      Patient requires continued skilled intervention: [x] Yes  [] No    Treatment/Activity Tolerance:  [x] Patient able to complete treatment  [] Patient limited by fatigue  [] Patient limited by pain    [] Patient limited by other medical complications  [] Other:     PLAN: See eval  [x] Continue per plan of care [] Alter current plan (see comments above)  [] Plan of care initiated [] Hold pending MD visit []

## 2022-08-08 ENCOUNTER — HOSPITAL ENCOUNTER (OUTPATIENT)
Dept: PHYSICAL THERAPY | Age: 67
Setting detail: THERAPIES SERIES
Discharge: HOME | End: 2022-08-08
Payer: MEDICARE

## 2022-08-08 PROCEDURE — 97110 THERAPEUTIC EXERCISES: CPT | Performed by: PHYSICAL THERAPIST

## 2022-08-08 PROCEDURE — 97140 MANUAL THERAPY 1/> REGIONS: CPT | Performed by: PHYSICAL THERAPIST

## 2022-08-09 NOTE — FLOWSHEET NOTE
scap clocks (3-9; 6-12; 1:30-7:30; 10:30-4:30) 2x10 ea Manual resistance for first two setups   Abduction isometrics 5x10\"                                  Pt Ed: POC, HEP, Role of PT, not to push ROM to aggressively early on, safe donning/doffing Manual Intervention ([id number])     stm of shoulder, light joint mobs for comfort 25'                     HEP instruction: Access Code: [email or web address]  URL: [email or web address]  Date: 06/30/2022  Prepared by: Cassie Hoff    Exercises  Seated Scapular Retraction - 3-4 x daily - 7 x weekly - 1 sets - 20 reps - 2-3 hold  Seated Shoulder Shrugs - 3-4 x daily - 7 x weekly - 1 sets - 20 reps - 2-3 hold  Standing Horizontal Shoulder Pendulum Supported with Arm Bent - 3-4 x daily - 7 x weekly - 1 sets - 20 reps  Standing Circular Shoulder Pendulum Supported with Arm Bent - 3-4 x daily - 7 x weekly - 1 sets - 20 reps  Standing Shoulder and Trunk Flexion at Table - 3-4 x daily - 7 x weekly - 1 sets - 10 reps - 5 hold    Therapeutic Exercise and NMR EXR  [x] (01083) Provided verbal/tactile cueing for activities related to strengthening, flexibility, endurance, ROM  for improvements in scapular, scapulothoracic and UE control with self care, reaching, carrying, lifting, house/yardwork, driving/computer work.    [] (69556) Provided verbal/tactile cueing for activities related to improving balance, coordination, kinesthetic sense, posture, motor skill, proprioception  to assist with  scapular, scapulothoracic and UE control with self care, reaching, carrying, lifting, house/yardwork, driving/computer work. Therapeutic Activities:    [x] (82411 or 07866) Provided verbal/tactile cueing for activities related to improving balance, coordination, kinesthetic sense, posture, motor skill, proprioception and motor activation to allow for proper function of scapular, scapulothoracic and UE control with self care, carrying, lifting, driving/computer work.      Home Exercise Program:    [x] Adjusted    Long Term Goals: To be achieved in: 12 weeks  1. Disability index score of 46 or more per FOTO to assist with reaching prior level of function. [x] Progressing: [] Met: [] Not Met: [] Adjusted  2. Patient will demonstrate increased AROM to 150 degrees of forward flexion to allow for proper joint functioning as indicated by patients Functional Deficits. [x] Progressing: [] Met: [] Not Met: [] Adjusted  3. Patient will demonstrate an increase in Strength to 4+/5 globally without pain to allow for proper functional mobility as indicated by patients Functional Deficits. [x] Progressing: [] Met: [] Not Met: [] Adjusted  4. Patient will return to all ADL's and workout related functional activities without increased symptoms or restriction. [x] Progressing: [] Met: [] Not Met: [] Adjusted    ASSESSMENT:  corrected patient abduction isometric to focus on keeping shoulder at 0 degrees abduction. Overall Progression Towards Functional goals/ Treatment Progress Update:  [] Patient is progressing as expected towards functional goals listed. [] Progression is slowed due to complexities/Impairments listed. [] Progression has been slowed due to co-morbidities.   [x] Plan just implemented, too soon to assess goals progression <30days   [] Goals require adjustment due to lack of progress  [] Patient is not progressing as expected and requires additional follow up with physician  [] Other    Prognosis for POC: [x] Good [] Fair  [] Poor      Patient requires continued skilled intervention: [x] Yes  [] No    Treatment/Activity Tolerance:  [x] Patient able to complete treatment  [] Patient limited by fatigue  [] Patient limited by pain    [] Patient limited by other medical complications  [] Other:     PLAN: See eval  [x] Continue per plan of care [] Alter current plan (see comments above)  [] Plan of care initiated [] Hold pending MD visit [] Discharge      Electronically signed by:  Wei George PT, MPT     Note: If patient does not return for scheduled/ recommended follow up visits, this note will serve as a discharge from care along with most recent update on progress.

## 2022-08-10 ENCOUNTER — OFFICE VISIT (OUTPATIENT)
Dept: ORTHOPEDIC SURGERY | Age: 67
End: 2022-08-10

## 2022-08-10 VITALS — WEIGHT: 110 LBS | HEIGHT: 63 IN | BODY MASS INDEX: 19.49 KG/M2

## 2022-08-10 DIAGNOSIS — Z98.890 S/P ROTATOR CUFF SURGERY: Primary | ICD-10-CM

## 2022-08-10 PROCEDURE — 99024 POSTOP FOLLOW-UP VISIT: CPT | Performed by: ORTHOPAEDIC SURGERY

## 2022-08-10 NOTE — PROGRESS NOTES
History of Present Illness:  Edward Dhaliwal is a pleasant 69 y.o. female who presents for a post operative visit. The patient underwent a right revision rotator cuff repair with collagen patch augmentation on 6/13/2022. She is 8 week postop. She has been compliant with wearing the UltraSling brace at all times. She has continued in physical therapy at the [facility] primarily working with Lalit Melara. She states her pain is very minimal and would like to make sure she is doing ok at this time in her recovery. She is planning to go on a trip for two trips starting this Saturday. She has a PT visit tomorrow. She denies fevers, chills, numbness, tingling, and shortness of breath. Medical History:  Patient's medications, allergies, past medical, surgical, social and family histories were reviewed and updated as appropriate. No notes on file    Review of Systems  A 14 point review of systems was completed by the patient and is available in the media section of the scanned medical record and was reviewed on 8/10/2022. Vital Signs: There were no vitals filed for this visit. General/Appearance: Alert and oriented and in no apparent distress. Skin:  There are no skin lesions, cellulitis, or extreme edema. The patient has warm and well-perfused Bilateral upper extremities with brisk capillary refill. Right Shoulder Exam:    Inspection: Shoulder incision portals are clean, dry and intact and well approximated. The Prineo dressing is still in place. Mild ecchymosis and swelling are present as can be expected.  There is no erythema, drainage or other signs of infection    Palpation:  No crepitus to gentle motion    Active Assisted Range of Motion: forward elevation to 100, abduction to 40, external rotation to 30 and Internal rotation in the back is to L4  Passive Range of Motion:  Tolerates gentle passive motion well and FE to 140 with soft end point and abduction to 120    Strength:  External rotation with resistance is to -4/5    Special Tests:  negative external lag. Neurovascular: Sensation to light touch is intact, no motor deficits, palpable radial pulses 2+    Radiology:     No new XR obtained at this time. Assessment :  Ms. Shawanda Hatch is a pleasant 69 y.o. patient who underwent a right revision rotator cuff repair with collagen patch augmentation on 6/13/2022 She is 8 week postop and right on track. Impression:  Encounter Diagnosis   Name Primary? S/P rotator cuff surgery Yes         Office Procedures:  Orders Placed This Encounter   Procedures    [facility] Physical Therapy [facility]     Referral Priority:   Routine     Referral Type:   Eval and Treat     Referral Reason:   Specialty Services Required     Requested Specialty:   Physical Therapist     Number of Visits Requested:   1         Treatment Plan:    Overall Shawanda Hatch is doing well. She may discontinue the sling completely. She continue in physical therapy at [facility] once per week for now. She has an upcoming vacation and she should continue her home exercise program.  We will renew the PT order today. All of her questions were fully answered today. We would like to see Shawanda Hatch back in 4-5 weeks for follow-up visit. 8/10/2022  10:21 AM      Alena Pickens PA-C  Orthopaedic Sports Medicine Physician Assistant    During this examination, I, Alena Pickens PA-C, functioned as a scribe for Dr. Justyn Muhammad. This dictation was performed with a verbal recognition program (DRAGON) and it was checked for errors. It is possible that there are still dictated errors within this office note. If so, please bring any errors to my attention for an addendum.   All efforts were made to ensure that this office note is accurate.  ____________________  I, Dr. Justyn Muhammad, personally performed the services described in this documentation as described by Alena Pickens PA-C in my presence, and it is both accurate and complete. Corrie Shaver MD, PhD  8/10/2022

## 2022-08-11 ENCOUNTER — HOSPITAL ENCOUNTER (OUTPATIENT)
Dept: PHYSICAL THERAPY | Age: 67
Setting detail: THERAPIES SERIES
Discharge: HOME | End: 2022-08-11
Payer: MEDICARE

## 2022-08-11 PROCEDURE — 97110 THERAPEUTIC EXERCISES: CPT | Performed by: PHYSICAL THERAPIST

## 2022-08-11 PROCEDURE — 97140 MANUAL THERAPY 1/> REGIONS: CPT | Performed by: PHYSICAL THERAPIST

## 2022-08-11 PROCEDURE — 97112 NEUROMUSCULAR REEDUCATION: CPT | Performed by: PHYSICAL THERAPIST

## 2022-08-13 NOTE — FLOWSHEET NOTE
The [facility] and [address],  Sports Performance and Rehabilitation, [facility] [address]                  793 [facility],5Th Floor        Jose Antonio Rodriguez  Phone:   Fax: [phone]        Date: 8/10/2022    Patient Name:  Thony Yee    :  1955  MRN: [id number]  Restrictions/Precautions: None of note   Medical/Treatment Diagnosis Information:  Diagnosis: Z98.890 (ICD-10-CM) - S/P rotator cuff surgery  Treatment Diagnosis: R Shoulder Pain A56.860  Insurance/Certification information:  PT Insurance Information: Medicare, 20%, no limit for visits  Physician Information:  Juliana Wan MD  Has the plan of care been signed (Y/N):        []  Yes  [x]  No     Date of Patient follow up with Physician: 8/10/22      Is this a Progress Report:     []  Yes  [x]  No        If Yes:  Date Range for reporting period:  Beginning 22  Ending    Progress report will be due (10 Rx or 30 days whichever is less):        Recertification will be due (POC Duration  / 90 days whichever is less): 22      Visit # Insurance Allowable Auth Required   In-person 10 BMN []  Yes []  No          Functional Scale: FOTO:     Date assessed:  22       Number of Comorbidities:  [x]0     []1-2    []3+    Latex Allergy:  [x]NO      []YES  Preferred Language for Healthcare:   [x]English       []other:      Pain level:  nr/10     SUBJECTIVE:  \"doing OK\"    OBJECTIVE:   Observation: meeting rom goals   Test measurements:   RESTRICTIONS/PRECAUTIONS: DOS: 22 s/p R RTC revision with collagen patch:   FWD AAROM to 90 (progress 10 deg per week till 140)  ER AAROM to 20 deg (progress 5 deg per week till 36)  Ok for rhomboid pinch and isometric abd    Exercises/Interventions:     Therapeutic Ex (88581)/NMR re-education (40799) Sets/sec Notes/CUES   Seated Scap Retraction 3x10   YTB      Table slides  Standing table slides with SB 10x3\"  2x20 ea Highest was 115  Flexion + wipers scap clocks (3-9; 6-12; 1:30-7:30; 10:30-4:30) 2x10 ea Manual resistance for first two setups   Abduction isometrics 5x10\"         Bicep curls  30x     Supine cane: overhead, er @ 40 6x, 4x    Supine AA raises 20    Sidelying er 20x    Wall walk  3x    Pt Ed: POC, HEP, Role of PT, not to push ROM to aggressively early on, safe donning/doffing Manual Intervention ([id number])     stm of shoulder, light joint mobs for comfort 15'                     [facility]    -   8[facility]    Shoulder and Trunk Flexion at Table - 3-4 x daily - 7 x weekly - 1 sets - 10 reps - 5 hold    Therapeutic Exercise and NMR EXR  [x] (29083) Provided verbal/tactile cueing for activities related to strengthening, flexibility, endurance, ROM  for improvements in scapular, scapulothoracic and UE control with self care, reaching, carrying, lifting, house/yardwork, driving/computer work.    [] (22743) Provided verbal/tactile cueing for activities related to improving balance, coordination, kinesthetic sense, posture, motor skill, proprioception  to assist with  scapular, scapulothoracic and UE control with self care, reaching, carrying, lifting, house/yardwork, driving/computer work. Therapeutic Activities:    [x] (95418 or 15475) Provided verbal/tactile cueing for activities related to improving balance, coordination, kinesthetic sense, posture, motor skill, proprioception and motor activation to allow for proper function of scapular, scapulothoracic and UE control with self care, carrying, lifting, driving/computer work.      Home Exercise Program:    [x] (61852) Reviewed/Progressed HEP activities related to strengthening, flexibility, endurance, ROM of scapular, scapulothoracic and UE control with self care, reaching, carrying, lifting, house/yardwork, driving/computer work  [] (06228) Reviewed/Progressed HEP activities related to improving balance, coordination, kinesthetic sense, posture, motor skill, proprioception of scapular, scapulothoracic and UE control with self care, reaching, carrying, lifting, house/yardwork, driving/computer work      Manual Treatments:  PROM / STM / Oscillations-Mobs:  G-I, II, III, IV (PA's, Inf., Post.)  [x] (56303) Provided manual therapy to mobilize soft tissue/joints of cervical/CT, scapular GHJ and UE for the purpose of modulating pain, promoting relaxation,  increasing ROM, reducing/eliminating soft tissue swelling/inflammation/restriction, improving soft tissue extensibility and allowing for proper ROM for normal function with self care, reaching, carrying, lifting, house/yardwork, driving/computer work    Modalities:  Cervical CP on shoulder to end    Charges  Timed Code Treatment Minutes: 37'   Total Treatment Minutes: 37'     [] EVAL (LOW) 42281   [] EVAL (MOD) 48075   [] EVAL (HIGH) 66060   [] RE-EVAL     [x] JA(99018) x 1    [] IONTO  [x] NMR (57062) x 1    [] VASO   [x] Manual (52380) x 1   [] Other:  [] TA x      [] Mech Traction (78707)  [] ES(attended) (17950)      [] ES (un) (65599):     GOALS:  Patient stated goal: To get fully recovered from surgery and back to exercising. [] Progressing: [] Met: [] Not Met: [] Adjusted    Therapist goals for Patient:   Short Term Goals: To be achieved in: 2 weeks  1. Independent in HEP and progression per patient tolerance, in order to prevent re-injury. [x] Progressing: [] Met: [] Not Met: [] Adjusted  2. Patient will have a decrease in pain to facilitate improvement in movement, function, and ADLs as indicated by Functional Deficits. [x] Progressing: [] Met: [] Not Met: [] Adjusted    Long Term Goals: To be achieved in: 12 weeks  1. Disability index score of 46 or more per FOTO to assist with reaching prior level of function. [x] Progressing: [] Met: [] Not Met: [] Adjusted  2. Patient will demonstrate increased AROM to 150 degrees of forward flexion to allow for proper joint functioning as indicated by patients Functional Deficits.    [x] Progressing: [] Met: [] Not Met: [] Adjusted  3. Patient will demonstrate an increase in Strength to 4+/5 globally without pain to allow for proper functional mobility as indicated by patients Functional Deficits. [x] Progressing: [] Met: [] Not Met: [] Adjusted  4. Patient will return to all ADL's and workout related functional activities without increased symptoms or restriction. [x] Progressing: [] Met: [] Not Met: [] Adjusted    ASSESSMENT:  corrected patient abduction isometric to focus on keeping shoulder at 0 degrees abduction. Overall Progression Towards Functional goals/ Treatment Progress Update:  [] Patient is progressing as expected towards functional goals listed. [] Progression is slowed due to complexities/Impairments listed. [] Progression has been slowed due to co-morbidities. [x] Plan just implemented, too soon to assess goals progression <30days   [] Goals require adjustment due to lack of progress  [] Patient is not progressing as expected and requires additional follow up with physician  [] Other    Prognosis for POC: [x] Good [] Fair  [] Poor      Patient requires continued skilled intervention: [x] Yes  [] No    Treatment/Activity Tolerance:  [x] Patient able to complete treatment  [] Patient limited by fatigue  [] Patient limited by pain    [] Patient limited by other medical complications  [] Other:     PLAN: See eval  [x] Continue per plan of care [] Alter current plan (see comments above)  [] Plan of care initiated [] Hold pending MD visit [] Discharge      Electronically signed by:  Radha Parker PT, MPT     Note: If patient does not return for scheduled/ recommended follow up visits, this note will serve as a discharge from care along with most recent update on progress.

## 2022-08-29 ENCOUNTER — HOSPITAL ENCOUNTER (OUTPATIENT)
Dept: PHYSICAL THERAPY | Age: 67
Setting detail: THERAPIES SERIES
Discharge: HOME | End: 2022-08-29
Payer: MEDICARE

## 2022-08-29 PROCEDURE — 97110 THERAPEUTIC EXERCISES: CPT | Performed by: PHYSICAL THERAPIST

## 2022-08-29 PROCEDURE — 97140 MANUAL THERAPY 1/> REGIONS: CPT | Performed by: PHYSICAL THERAPIST

## 2022-08-29 PROCEDURE — 97112 NEUROMUSCULAR REEDUCATION: CPT | Performed by: PHYSICAL THERAPIST

## 2022-08-30 NOTE — FLOWSHEET NOTE
The [facility] and [address],  Sports Performance and Rehabilitation, [facility] [address]                  793 [facility],5Th Floor        [place], [address]  Phone:   Fax: [phone]        Date: 2022    Patient Name:  Teresa Tariq    :  1955  MRN: [id number]  Restrictions/Precautions: None of note   Medical/Treatment Diagnosis Information:  Diagnosis: Z98.890 (ICD-10-CM) - S/P rotator cuff surgery  Treatment Diagnosis: R Shoulder Pain R36.769  Insurance/Certification information:  PT Insurance Information: Medicare, 20%, no limit for visits  Physician Information:  Rico Toscano MD  Has the plan of care been signed (Y/N):        []  Yes  [x]  No     Date of Patient follow up with Physician: 8/10/22      Is this a Progress Report:     []  Yes  [x]  No        If Yes:  Date Range for reporting period:  Beginning 22  Ending    Progress report will be due (10 Rx or 30 days whichever is less):        Recertification will be due (POC Duration  / 90 days whichever is less): 22      Visit # Insurance Allowable Auth Required   In-person 11 BMN []  Yes []  No          Functional Scale: FOTO:     Date assessed:  22       Number of Comorbidities:  [x]0     []1-2    []3+    Latex Allergy:  [x]NO      []YES  Preferred Language for Healthcare:   [x]English       []other:      Pain level:  nr/10     SUBJECTIVE:  \"doing fine\"    OBJECTIVE:   Observation:   safe zone   Test measurements:   RESTRICTIONS/PRECAUTIONS: DOS: 22 s/p R RTC revision with collagen patch:   FWD AAROM to 90 (progress 10 deg per week till 140)  ER AAROM to 20 deg (progress 5 deg per week till 36)  Ok for rhomboid pinch and isometric abd    Exercises/Interventions:     Therapeutic Ex (28736)/NMR re-education (47858) Sets/sec Notes/CUES   Seated Scap Retraction 3x10   YTB      Table slides  Standing table slides with SB 10x3\"  2x20 ea Highest was 115  Flexion + wipers scap clocks (3-9; 6-12; 1:30-7:30; 10:30-4:30) 2x10 ea Manual resistance for first two setups   Abduction isometrics 5x10\"         Bicep curls  30x     Supine cane: overhead, er @ 40 6x, 4x    Supine AA raises 20    Sidelying er 20x    Wall walk  3x    Pt Ed: POC, HEP, Role of PT, not to push ROM to aggressively early on, safe donning/doffing Manual Intervention ([id number])     stm of shoulder, light joint mobs for comfort 15'                     Access Code [facility]    -   8/11    Shoulder and Trunk Flexion at Table - 3-4 x daily - 7 x weekly - 1 sets - 10 reps - 5 hold    Therapeutic Exercise and NMR EXR  [x] (95037) Provided verbal/tactile cueing for activities related to strengthening, flexibility, endurance, ROM  for improvements in scapular, scapulothoracic and UE control with self care, reaching, carrying, lifting, house/yardwork, driving/computer work.    [] (22039) Provided verbal/tactile cueing for activities related to improving balance, coordination, kinesthetic sense, posture, motor skill, proprioception  to assist with  scapular, scapulothoracic and UE control with self care, reaching, carrying, lifting, house/yardwork, driving/computer work. Therapeutic Activities:    [x] (75787 or 23293) Provided verbal/tactile cueing for activities related to improving balance, coordination, kinesthetic sense, posture, motor skill, proprioception and motor activation to allow for proper function of scapular, scapulothoracic and UE control with self care, carrying, lifting, driving/computer work.      Home Exercise Program:    [x] (19057) Reviewed/Progressed HEP activities related to strengthening, flexibility, endurance, ROM of scapular, scapulothoracic and UE control with self care, reaching, carrying, lifting, house/yardwork, driving/computer work  [] (94792) Reviewed/Progressed HEP activities related to improving balance, coordination, kinesthetic sense, posture, motor skill, proprioception of scapular, scapulothoracic and UE control with self care, reaching, carrying, lifting, house/yardwork, driving/computer work      Manual Treatments:  PROM / STM / Oscillations-Mobs:  G-I, II, III, IV (PA's, Inf., Post.)  [x] (14723) Provided manual therapy to mobilize soft tissue/joints of cervical/CT, scapular GHJ and UE for the purpose of modulating pain, promoting relaxation,  increasing ROM, reducing/eliminating soft tissue swelling/inflammation/restriction, improving soft tissue extensibility and allowing for proper ROM for normal function with self care, reaching, carrying, lifting, house/yardwork, driving/computer work    Modalities:  Cervical CP on shoulder to end    Charges  Timed Code Treatment Minutes: 37'   Total Treatment Minutes: 37'     [] EVAL (LOW) 97213   [] EVAL (MOD) 37836   [] EVAL (HIGH) 81780   [] RE-EVAL     [x] PQ(69994) x 1    [] IONTO  [x] NMR (48748) x 1    [] VASO   [x] Manual (88952) x 1   [] Other:  [] TA x      [] Mech Traction (85553)  [] ES(attended) (65375)      [] ES (un) (71301):     GOALS:  Patient stated goal: To get fully recovered from surgery and back to exercising. [] Progressing: [] Met: [] Not Met: [] Adjusted    Therapist goals for Patient:   Short Term Goals: To be achieved in: 2 weeks  1. Independent in HEP and progression per patient tolerance, in order to prevent re-injury. [x] Progressing: [] Met: [] Not Met: [] Adjusted  2. Patient will have a decrease in pain to facilitate improvement in movement, function, and ADLs as indicated by Functional Deficits. [x] Progressing: [] Met: [] Not Met: [] Adjusted    Long Term Goals: To be achieved in: 12 weeks  1. Disability index score of 46 or more per FOTO to assist with reaching prior level of function. [x] Progressing: [] Met: [] Not Met: [] Adjusted  2. Patient will demonstrate increased AROM to 150 degrees of forward flexion to allow for proper joint functioning as indicated by patients Functional Deficits.    [x] Progressing: [] Met: [] Not Met: [] Adjusted  3. Patient will demonstrate an increase in Strength to 4+/5 globally without pain to allow for proper functional mobility as indicated by patients Functional Deficits. [x] Progressing: [] Met: [] Not Met: [] Adjusted  4. Patient will return to all ADL's and workout related functional activities without increased symptoms or restriction. [x] Progressing: [] Met: [] Not Met: [] Adjusted    ASSESSMENT:  corrected patient abduction isometric to focus on keeping shoulder at 0 degrees abduction. Overall Progression Towards Functional goals/ Treatment Progress Update:  [] Patient is progressing as expected towards functional goals listed. [] Progression is slowed due to complexities/Impairments listed. [] Progression has been slowed due to co-morbidities. [x] Plan just implemented, too soon to assess goals progression <30days   [] Goals require adjustment due to lack of progress  [] Patient is not progressing as expected and requires additional follow up with physician  [] Other    Prognosis for POC: [x] Good [] Fair  [] Poor      Patient requires continued skilled intervention: [x] Yes  [] No    Treatment/Activity Tolerance:  [x] Patient able to complete treatment  [] Patient limited by fatigue  [] Patient limited by pain    [] Patient limited by other medical complications  [] Other:     PLAN: See eval  [x] Continue per plan of care [] Alter current plan (see comments above)  [] Plan of care initiated [] Hold pending MD visit [] Discharge      Electronically signed by:  Maricel Nj PT, MPT     Note: If patient does not return for scheduled/ recommended follow up visits, this note will serve as a discharge from care along with most recent update on progress.

## 2022-09-01 ENCOUNTER — HOSPITAL ENCOUNTER (OUTPATIENT)
Dept: PHYSICAL THERAPY | Age: 67
Setting detail: THERAPIES SERIES
Discharge: HOME | End: 2022-09-01
Payer: MEDICARE

## 2022-09-01 PROCEDURE — G0283 ELEC STIM OTHER THAN WOUND: HCPCS | Performed by: PHYSICAL THERAPIST

## 2022-09-01 PROCEDURE — 97110 THERAPEUTIC EXERCISES: CPT | Performed by: PHYSICAL THERAPIST

## 2022-09-01 PROCEDURE — 97140 MANUAL THERAPY 1/> REGIONS: CPT | Performed by: PHYSICAL THERAPIST

## 2022-09-03 NOTE — FLOWSHEET NOTE
[facility] and [address],  Sports Performance and Rehabilitation, [facility] [address]                  793 [facility],5Th Floor        [place], [address]  Phone:   Fax: [phone]        Date: 2022    Patient Name:  Stefania Ryagoza    :  1955  MRN: [id number]  Restrictions/Precautions: None of note   Medical/Treatment Diagnosis Information:  Diagnosis: Z98.890 (ICD-10-CM) - S/P rotator cuff surgery  Treatment Diagnosis: R Shoulder Pain N22.081  Insurance/Certification information:  PT Insurance Information: Medicare, 20%, no limit for visits  Physician Information:  Taty Tran MD  Has the plan of care been signed (Y/N):        []  Yes  [x]  No     Date of Patient follow up with Physician: 8/10/22      Is this a Progress Report:     []  Yes  [x]  No        If Yes:  Date Range for reporting period:  Beginning 22  Ending    Progress report will be due (10 Rx or 30 days whichever is less):        Recertification will be due (POC Duration  / 90 days whichever is less): 22      Visit # Insurance Allowable Auth Required   In-person 12 BMN []  Yes []  No          Functional Scale: FOTO:     Date assessed:  22       Number of Comorbidities:  [x]0     []1-2    []3+    Latex Allergy:  [x]NO      []YES  Preferred Language for Healthcare:   [x]English       []other:      Pain level:  nr/10     SUBJECTIVE:  \"I made myself sore while biking\"    OBJECTIVE:   Observation:  mild global tenderness   Test measurements:   RESTRICTIONS/PRECAUTIONS: DOS: 22 s/p R RTC revision with collagen patch:   FWD AAROM to 90 (progress 10 deg per week till 140)  ER AAROM to 20 deg (progress 5 deg per week till 36)  Ok for rhomboid pinch and isometric abd    Exercises/Interventions:     Therapeutic Ex (00574)/NMR re-education (87256) Sets/sec Notes/CUES   Seated Scap Retraction 3x10   YTB      Table slides  Standing table slides with SB Highest was 115  Flexion + wipers   scap clocks (3-9; 6-12; 1:30-7:30; 10:30-4:30) Manual resistance for first two setups   Abduction isometrics        Bicep curls     Supine cane: overhead, er @ 40    Supine AA raises    Sidelying er    Wall walk     Pt Ed: POC, HEP, Role of PT, not to push ROM to aggressively early on, safe donning/doffing Manual Intervention ([id number])     stm of shoulder, light joint mobs for comfort 25'                     Access Code [facility]    -   8/11    Shoulder and Trunk Flexion at Table - 3-4 x daily - 7 x weekly - 1 sets - 10 reps - 5 hold    Therapeutic Exercise and NMR EXR  [x] (76501) Provided verbal/tactile cueing for activities related to strengthening, flexibility, endurance, ROM  for improvements in scapular, scapulothoracic and UE control with self care, reaching, carrying, lifting, house/yardwork, driving/computer work.    [] (56605) Provided verbal/tactile cueing for activities related to improving balance, coordination, kinesthetic sense, posture, motor skill, proprioception  to assist with  scapular, scapulothoracic and UE control with self care, reaching, carrying, lifting, house/yardwork, driving/computer work. Therapeutic Activities:    [x] (35301 or 25407) Provided verbal/tactile cueing for activities related to improving balance, coordination, kinesthetic sense, posture, motor skill, proprioception and motor activation to allow for proper function of scapular, scapulothoracic and UE control with self care, carrying, lifting, driving/computer work.      Home Exercise Program:    [x] (32216) Reviewed/Progressed HEP activities related to strengthening, flexibility, endurance, ROM of scapular, scapulothoracic and UE control with self care, reaching, carrying, lifting, house/yardwork, driving/computer work  [] (78938) Reviewed/Progressed HEP activities related to improving balance, coordination, kinesthetic sense, posture, motor skill, proprioception of scapular, scapulothoracic and UE control with self care, reaching, carrying, lifting, house/yardwork, driving/computer work      Manual Treatments:  PROM / STM / Oscillations-Mobs:  G-I, II, III, IV (PA's, Inf., Post.)  [x] (83517) Provided manual therapy to mobilize soft tissue/joints of cervical/CT, scapular GHJ and UE for the purpose of modulating pain, promoting relaxation,  increasing ROM, reducing/eliminating soft tissue swelling/inflammation/restriction, improving soft tissue extensibility and allowing for proper ROM for normal function with self care, reaching, carrying, lifting, house/yardwork, driving/computer work    Modalities:  Cervical CP on shoulder to end    Charges  Timed Code Treatment Minutes: 35'   Total Treatment Minutes: 48'     [] EVAL (LOW) 26317   [] EVAL (MOD) 42050   [] EVAL (HIGH) 95200   [] RE-EVAL     [] UU(79505) x 1    [] IONTO  [] NMR (71836) x 1    [] VASO   [x] Manual (32095) x 2  [] Other:  [] TA x      [] Mech Traction (65253)  [] ES(attended) (40800)      [x] ES (un) (78422):     GOALS:  Patient stated goal: To get fully recovered from surgery and back to exercising. [] Progressing: [] Met: [] Not Met: [] Adjusted    Therapist goals for Patient:   Short Term Goals: To be achieved in: 2 weeks  1. Independent in HEP and progression per patient tolerance, in order to prevent re-injury. [x] Progressing: [] Met: [] Not Met: [] Adjusted  2. Patient will have a decrease in pain to facilitate improvement in movement, function, and ADLs as indicated by Functional Deficits. [x] Progressing: [] Met: [] Not Met: [] Adjusted    Long Term Goals: To be achieved in: 12 weeks  1. Disability index score of 46 or more per FOTO to assist with reaching prior level of function. [x] Progressing: [] Met: [] Not Met: [] Adjusted  2. Patient will demonstrate increased AROM to 150 degrees of forward flexion to allow for proper joint functioning as indicated by patients Functional Deficits.    [x] Progressing: [] Met: [] Not Met: [] Adjusted  3. Patient will demonstrate an increase in Strength to 4+/5 globally without pain to allow for proper functional mobility as indicated by patients Functional Deficits. [x] Progressing: [] Met: [] Not Met: [] Adjusted  4. Patient will return to all ADL's and workout related functional activities without increased symptoms or restriction. [x] Progressing: [] Met: [] Not Met: [] Adjusted    ASSESSMENT:  corrected patient abduction isometric to focus on keeping shoulder at 0 degrees abduction. Overall Progression Towards Functional goals/ Treatment Progress Update:  [] Patient is progressing as expected towards functional goals listed. [] Progression is slowed due to complexities/Impairments listed. [] Progression has been slowed due to co-morbidities. [x] Plan just implemented, too soon to assess goals progression <30days   [] Goals require adjustment due to lack of progress  [] Patient is not progressing as expected and requires additional follow up with physician  [] Other    Prognosis for POC: [x] Good [] Fair  [] Poor      Patient requires continued skilled intervention: [x] Yes  [] No    Treatment/Activity Tolerance:  [x] Patient able to complete treatment  [] Patient limited by fatigue  [] Patient limited by pain    [] Patient limited by other medical complications  [] Other:     PLAN: See eval  [x] Continue per plan of care [] Alter current plan (see comments above)  [] Plan of care initiated [] Hold pending MD visit [] Discharge      Electronically signed by:  Saul An PT, MPT     Note: If patient does not return for scheduled/ recommended follow up visits, this note will serve as a discharge from care along with most recent update on progress.

## 2022-09-04 NOTE — PROGRESS NOTES
Talia Mirza                                                      Physical Therapy Re-Certification Plan of Care    Dear Dr Kobe Barber ,    We had the pleasure of treating the following patient for physical therapy services at [address] [place] [address]. A summary of our findings can be found in the updated assessment below. This includes our plan of care. If you have any questions or concerns regarding these findings, please do not hesitate to contact me at the office phone number checked above. Thank you for the referral.       Physician Signature:_______________________________Date:__________________  By signing above (or electronic signature), therapists plan is approved by physician      Patient: Jimy Dhillon   : 1955   MRN: [id number]  Referring Physician:        Evaluation Date: 2022     Medical Diagnosis Information:    Diagnosis: Z98.890 (ICD-10-CM) - S/P rotator cuff surgery  Treatment Diagnosis: R Shoulder Pain M25.511                                            Insurance information:       Date Range:  3/8 -       Total visits:  10      G-Codes: (if applicable)       SUBJECTIVE: \"I am feeling pretty good at this point\"      Pain Scale: /10  :     OBJECTIVE:   Impairments:    ROM:  decreased passive by 15%      Strength/Neuromuscular control:  4-/5     MEDICARE CAP EXCEPTION DOCUMENTATION  I certify that this patient meets one of the below criteria necessary for becoming an exception to the Medicare cap on therapy services:  []  The patient has a condition that has a direct and significant impact on the need for therapy. (Significantly impacts the rate of recovery)  [x]  The patient has a complexity that has a direct and significant impact on the need for therapy.   (Significantly impacts the rate of recovery and is associated with a primary condition.)       []  The patient has associated variables that influence the amount of treatment to include:  Social support, self-efficacy/motivation, prognosis, time since onset/acuity. []  The patient has generalized musculoskeletal conditions or a condition affecting multiple sites that will have a direct impact on the rate of recovery. []  The patient had a prior episode of outpatient therapy during this calendar year for a different condition. []  The patient has a mental or cognitive disorder in addition to the condition being treated that will have a direct and significant impact on the rate of recovery. ASSESSMENT:    Response to Treatment:   - Patient is responding well to treatment and improvement is noted with regards  to goals    Updated Functional deficiencies/Impairments   The patient demonstrated at least 41% but less than 43% impairment, limitation or restriction in:   - carrying, moving and handling objects.  - Decreased upper extremity functional strength and neuromuscular control - Reduced overall functional level with carrying /lifting  - Noted GHJ joint hypomobility- Reduced overall functional level with carrying /lifting   - Noted cervicothoracic joint hypomobility- Reduced overall functional level with carrying /lifting   - Pain/difficulty with driving and/or computer work- Reduced overall functional level   - Pain/difficulty associated with self care tasks- Reduced overall functional level and ADL status  - Pain/difficulty with lifting/reaching/carrying - Reduced overall functional level with carrying and lifting  - Pain/difficulty with household work- Reduced ADL status  - Unable to perform sport/recreational activity due to pain and dysfunction    Updated Classification:  - signs/symptoms consistent with post-surgical status including decreased ROM, strength and function. - impaired joint mobility, motor function, muscle performance and range of motion associated with:  - bony or soft tissue surgical procedure. (Pattern 4I)  - ligament or other connective tissue dysfunction.  (Pattern 4D)  - localized inflammation. (Pattern 4E)      Prognosis/Rehab Potential:       - Good     Factors affecting rehab potential:  - associated co-morbidities    Tolerance of evaluation/treatment:     - Good     New/Updated Goals (if applicable):  [x] No change to goals established upon initial eval/last progress note:  New Goals:    GOALS:     Plan of Care:  [x] Continue Current Therapy Intervention    Frequency/Duration:  2 days per week for 12 Weeks:  Interventions:  1. Therapeutic exercise including: strength training, ROM, NMR and proprioception for the scapula, core and Upper extremity  2. Manual therapy as indicated including Dry Needling/IASTM, STM, PROM, Gr I-IV mobilizations, spinal mobilization/manipulation. 3. Modalities as needed including: thermal agents, E-stim, US, iontophoresis as indicated. 4. Patient education on joint protection, activity modification, progression of HEP.         Electronically signed by:  Alicia Gutierrez PT, MPT

## 2022-09-06 ENCOUNTER — HOSPITAL ENCOUNTER (OUTPATIENT)
Dept: PHYSICAL THERAPY | Age: 67
Setting detail: THERAPIES SERIES
Discharge: HOME | End: 2022-09-06
Payer: MEDICARE

## 2022-09-06 PROCEDURE — 97140 MANUAL THERAPY 1/> REGIONS: CPT

## 2022-09-06 NOTE — FLOWSHEET NOTE
[facility] and [address],  Sports Performance and Rehabilitation, [facility] [address] 12[address]                  [address] [facility],5[address] Floor        Jose Antonio Rodriguez  Phone:   Fax: [phone]        Date: 22     Patient Name:  Dixie Doll    :  1955  MRN: [id number]  Restrictions/Precautions: None of note   Medical/Treatment Diagnosis Information:  Diagnosis: Z98.890 (ICD-10-CM) - S/P rotator cuff surgery  Treatment Diagnosis: R Shoulder Pain P96.621  Insurance/Certification information:  PT Insurance Information: Medicare, 20%, no limit for visits  Physician Information:  Radha Arevalo MD  Has the plan of care been signed (Y/N):        []  Yes  [x]  No     Date of Patient follow up with Physician:       Is this a Progress Report:     []  Yes  [x]  No        If Yes:  Date Range for reporting period:  Beginning 22  Ending    Progress report will be due (10 Rx or 30 days whichever is less): 3/06/96       Recertification will be due (POC Duration  / 90 days whichever is less): 22      Visit # Insurance Allowable Auth Required   In-person 13 BMN []  Yes []  No          Functional Scale: FOTO:     Date assessed:  22       Number of Comorbidities:  [x]0     []1-2    []3+    Latex Allergy:  [x]NO      []YES  Preferred Language for Healthcare:   [x]English       []other:      Pain level:  , pain elicited with R shld IR with elbow flexion     SUBJECTIVE:  Patient very concerned today secondary to continued pain at R bicep area with certain motions since last week when riding her spin bike.      OBJECTIVE:   Observation:  mild global tenderness   Test measurements:   RESTRICTIONS/PRECAUTIONS: DOS: 22 s/p R RTC revision with collagen patch:   FWD AAROM to 90 (progress 10 deg per week till 140)  ER AAROM to 20 deg (progress 5 deg per week till 40)  Ok for rhomboid pinch and isometric abd    Exercises/Interventions:     Therapeutic Ex (13077)/NMR re-education (34131) Sets/sec Notes/CUES   Seated Scap Retraction    Table slides  Standing table slides with SB Highest was 115  Flexion + wipers   scap clocks (3-9; 6-12; 1:30-7:30; 10:30-4:30) Manual resistance for first two setups   Abduction isometrics    Std ball rolls 2 x 10 Blue SB on table   Bicep curls     Supine cane: overhead, er @ 40    Supine AA raises    Sidelying er    Wall walk     Pt Ed: POC, HEP, Role of PT, not to push ROM to aggressively early on, safe donning/doffing Manual Intervention ([phone])     stm of shoulder, light joint mobs for comfort 25' Deep prep at R bicep                    Access Code [facility]    -   8/11    Shoulder and Trunk Flexion at Table - 3-4 x daily - 7 x weekly - 1 sets - 10 reps - 5 hold    Therapeutic Exercise and NMR EXR  [x] (91331) Provided verbal/tactile cueing for activities related to strengthening, flexibility, endurance, ROM  for improvements in scapular, scapulothoracic and UE control with self care, reaching, carrying, lifting, house/yardwork, driving/computer work.    [] (06658) Provided verbal/tactile cueing for activities related to improving balance, coordination, kinesthetic sense, posture, motor skill, proprioception  to assist with  scapular, scapulothoracic and UE control with self care, reaching, carrying, lifting, house/yardwork, driving/computer work. Therapeutic Activities:    [x] (67583 or 62241) Provided verbal/tactile cueing for activities related to improving balance, coordination, kinesthetic sense, posture, motor skill, proprioception and motor activation to allow for proper function of scapular, scapulothoracic and UE control with self care, carrying, lifting, driving/computer work.      Home Exercise Program:    [x] (03186) Reviewed/Progressed HEP activities related to strengthening, flexibility, endurance, ROM of scapular, scapulothoracic and UE control with self care, reaching, carrying, lifting, house/yardwork, driving/computer work  [] (52765) Reviewed/Progressed HEP activities related to improving balance, coordination, kinesthetic sense, posture, motor skill, proprioception of scapular, scapulothoracic and UE control with self care, reaching, carrying, lifting, house/yardwork, driving/computer work      Manual Treatments:  PROM / STM / Oscillations-Mobs:  G-I, II, III, IV (PA's, Inf., Post.)  [x] (44762) Provided manual therapy to mobilize soft tissue/joints of cervical/CT, scapular GHJ and UE for the purpose of modulating pain, promoting relaxation,  increasing ROM, reducing/eliminating soft tissue swelling/inflammation/restriction, improving soft tissue extensibility and allowing for proper ROM for normal function with self care, reaching, carrying, lifting, house/yardwork, driving/computer work    Modalities:  Ice x 10 min    Charges  Timed Code Treatment Minutes: 45'   Total Treatment Minutes: 48'     [] EVAL (LOW) 49930   [] EVAL (MOD) 47839   [] EVAL (HIGH) 76501   [] RE-EVAL     [] YN(16796) x 1    [] IONTO  [] NMR (77218) x 1    [] VASO   [x] Manual (98133) x 2  [] Other:  [] TA x      [] Mech Traction (05495)  [] ES(attended) (97846)      [] ES (un) (98118):     GOALS:  Patient stated goal: To get fully recovered from surgery and back to exercising. [] Progressing: [] Met: [] Not Met: [] Adjusted    Therapist goals for Patient:   Short Term Goals: To be achieved in: 2 weeks  1. Independent in HEP and progression per patient tolerance, in order to prevent re-injury. [x] Progressing: [] Met: [] Not Met: [] Adjusted  2. Patient will have a decrease in pain to facilitate improvement in movement, function, and ADLs as indicated by Functional Deficits. [x] Progressing: [] Met: [] Not Met: [] Adjusted    Long Term Goals: To be achieved in: 12 weeks  1. Disability index score of 46 or more per FOTO to assist with reaching prior level of function. [x] Progressing: [] Met: [] Not Met: [] Adjusted  2.  Patient will demonstrate increased AROM to 150 degrees of forward flexion to allow for proper joint functioning as indicated by patients Functional Deficits. [x] Progressing: [] Met: [] Not Met: [] Adjusted  3. Patient will demonstrate an increase in Strength to 4+/5 globally without pain to allow for proper functional mobility as indicated by patients Functional Deficits. [x] Progressing: [] Met: [] Not Met: [] Adjusted  4. Patient will return to all ADL's and workout related functional activities without increased symptoms or restriction. [x] Progressing: [] Met: [] Not Met: [] Adjusted    ASSESSMENT:  corrected patient abduction isometric to focus on keeping shoulder at 0 degrees abduction. Overall Progression Towards Functional goals/ Treatment Progress Update:  [] Patient is progressing as expected towards functional goals listed. [] Progression is slowed due to complexities/Impairments listed. [] Progression has been slowed due to co-morbidities. [x] Plan just implemented, too soon to assess goals progression <30days   [] Goals require adjustment due to lack of progress  [] Patient is not progressing as expected and requires additional follow up with physician  [] Other    Prognosis for POC: [x] Good [] Fair  [] Poor      Patient requires continued skilled intervention: [x] Yes  [] No    Treatment/Activity Tolerance:  [x] Patient able to complete treatment  [] Patient limited by fatigue  [] Patient limited by pain    [] Patient limited by other medical complications  [] Other: Patient did well with PT manual therapy and SB rollout on table. Discussed with patient regarding her symptoms; presents as a muscle strain and without a concerning mechanism of injury unlikely she injured her repair. Advised patient to continue with original HEP and to continue to use her R arm as a appropriate avoiding pain. PROM and joint mobility felt WNL. No real pain with STM at bicep.   Advised patient to call PT or doctor if continued concern (seeing  Next week). Patient would continue to benefit from continued PT to improve R RTC and periscapular strength, ROM, and functional ability. PLAN: See eval  [x] Continue per plan of care [] Alter current plan (see comments above)  [] Plan of care initiated [] Hold pending MD visit [] Discharge      Electronically signed by:  Svetlana De Luna PT, MPT     Note: If patient does not return for scheduled/ recommended follow up visits, this note will serve as a discharge from care along with most recent update on progress.

## 2022-09-09 ENCOUNTER — HOSPITAL ENCOUNTER (OUTPATIENT)
Dept: PHYSICAL THERAPY | Age: 67
Setting detail: THERAPIES SERIES
Discharge: HOME | End: 2022-09-09
Payer: MEDICARE

## 2022-09-09 PROCEDURE — 97140 MANUAL THERAPY 1/> REGIONS: CPT | Performed by: PHYSICAL THERAPIST

## 2022-09-12 ENCOUNTER — HOSPITAL ENCOUNTER (OUTPATIENT)
Dept: PHYSICAL THERAPY | Age: 67
Setting detail: THERAPIES SERIES
Discharge: HOME | End: 2022-09-12
Payer: MEDICARE

## 2022-09-12 PROCEDURE — G0283 ELEC STIM OTHER THAN WOUND: HCPCS | Performed by: PHYSICAL THERAPIST

## 2022-09-12 PROCEDURE — 97140 MANUAL THERAPY 1/> REGIONS: CPT | Performed by: PHYSICAL THERAPIST

## 2022-09-12 NOTE — FLOWSHEET NOTE
[facility] and [address],  Sports Performance and Rehabilitation, [facility] [address]                  [address] [facility],5[address]        Jose Antonio Rodriguez [place]  Phone:   Fax: [phone]        Date: 2022    Patient Name:  Harjinder Valdez    :  1955  MRN: [id number]  Restrictions/Precautions: None of note   Medical/Treatment Diagnosis Information:  Diagnosis: Z98.890 (ICD-10-CM) - S/P rotator cuff surgery  Treatment Diagnosis: R Shoulder Pain W63.573  Insurance/Certification information:  PT Insurance Information: Medicare, 20%, no limit for visits  Physician Information:  Estela Landrum MD  Has the plan of care been signed (Y/N):        []  Yes  [x]  No     Date of Patient follow up with Physician:       Is this a Progress Report:     []  Yes  [x]  No        If Yes:  Date Range for reporting period:  Beginning 22  Ending    Progress report will be due (10 Rx or 30 days whichever is less): 06       Recertification will be due (POC Duration  / 90 days whichever is less): 22      Visit # Insurance Allowable Auth Required   In-person 14 BMN []  Yes []  No          Functional Scale: FOTO:     Date assessed:  22       Number of Comorbidities:  [x]0     []1-2    []3+    Latex Allergy:  [x]NO      []YES  Preferred Language for Healthcare:   [x]English       []other:      Pain level:  nr, pain elicited with R shld IR with elbow flexion     SUBJECTIVE:  \"just still sore\"    OBJECTIVE:   Observation:  mild global tenderness   Test measurements:   RESTRICTIONS/PRECAUTIONS: DOS: 22 s/p R RTC revision with collagen patch:   FWD AAROM to 90 (progress 10 deg per week till 140)  ER AAROM to 20 deg (progress 5 deg per week till 36)  Ok for rhomboid pinch and isometric abd    Exercises/Interventions:     Therapeutic Ex (58209)/NMR re-education (12193) Sets/sec Notes/CUES   Seated Scap Retraction    Table slides  Standing table slides with SB Highest was 115  Flexion + wipers   scap clocks (3-9; 6-12; 1:30-7:30; 10:30-4:30) Manual resistance for first two setups   Abduction isometrics    Std ball rolls 2 x 10 Blue SB on table   Bicep curls     Supine cane: overhead, er @ 40    Supine AA raises    Sidelying er    Wall walk     Pt Ed: POC, HEP, Role of PT, not to push ROM to aggressively early on, safe donning/doffing Manual Intervention ([address] [facility])     stm of shoulder, light joint mobs for comfort 25' Deep prep at R bicep                    Access Code [facility]    -   8/11    Shoulder and Trunk Flexion at Table - 3-4 x daily - 7 x weekly - 1 sets - 10 reps - 5 hold    Therapeutic Exercise and NMR EXR  [x] (19924) Provided verbal/tactile cueing for activities related to strengthening, flexibility, endurance, ROM  for improvements in scapular, scapulothoracic and UE control with self care, reaching, carrying, lifting, house/yardwork, driving/computer work.    [] (88023) Provided verbal/tactile cueing for activities related to improving balance, coordination, kinesthetic sense, posture, motor skill, proprioception  to assist with  scapular, scapulothoracic and UE control with self care, reaching, carrying, lifting, house/yardwork, driving/computer work. Therapeutic Activities:    [x] (20532 or 15840) Provided verbal/tactile cueing for activities related to improving balance, coordination, kinesthetic sense, posture, motor skill, proprioception and motor activation to allow for proper function of scapular, scapulothoracic and UE control with self care, carrying, lifting, driving/computer work.      Home Exercise Program:    [x] (17156) Reviewed/Progressed HEP activities related to strengthening, flexibility, endurance, ROM of scapular, scapulothoracic and UE control with self care, reaching, carrying, lifting, house/yardwork, driving/computer work  [] (67460) Reviewed/Progressed HEP activities related to improving balance, coordination, kinesthetic sense, posture, motor skill, proprioception of scapular, scapulothoracic and UE control with self care, reaching, carrying, lifting, house/yardwork, driving/computer work      Manual Treatments:  PROM / STM / Oscillations-Mobs:  G-I, II, III, IV (PA's, Inf., Post.)  [x] (01722) Provided manual therapy to mobilize soft tissue/joints of cervical/CT, scapular GHJ and UE for the purpose of modulating pain, promoting relaxation,  increasing ROM, reducing/eliminating soft tissue swelling/inflammation/restriction, improving soft tissue extensibility and allowing for proper ROM for normal function with self care, reaching, carrying, lifting, house/yardwork, driving/computer work    Modalities:  Ice x 10 min    Charges  Timed Code Treatment Minutes: 35'   Total Treatment Minutes: 35'     [] EVAL (LOW) 92147   [] EVAL (MOD) 00431   [] EVAL (HIGH) 33224   [] RE-EVAL     [] VB(18105) x 1    [] IONTO  [] NMR (48794) x 1    [] VASO   [x] Manual (54459) x 2  [] Other:  [] TA x      [] Mech Traction (24801)  [] ES(attended) (88094)      [] ES (un) (03776):     GOALS:  Patient stated goal: To get fully recovered from surgery and back to exercising. [] Progressing: [] Met: [] Not Met: [] Adjusted    Therapist goals for Patient:   Short Term Goals: To be achieved in: 2 weeks  1. Independent in HEP and progression per patient tolerance, in order to prevent re-injury. [x] Progressing: [] Met: [] Not Met: [] Adjusted  2. Patient will have a decrease in pain to facilitate improvement in movement, function, and ADLs as indicated by Functional Deficits. [x] Progressing: [] Met: [] Not Met: [] Adjusted    Long Term Goals: To be achieved in: 12 weeks  1. Disability index score of 46 or more per FOTO to assist with reaching prior level of function. [x] Progressing: [] Met: [] Not Met: [] Adjusted  2.  Patient will demonstrate increased AROM to 150 degrees of forward flexion to allow for proper joint functioning as indicated by patients Functional Deficits. [x] Progressing: [] Met: [] Not Met: [] Adjusted  3. Patient will demonstrate an increase in Strength to 4+/5 globally without pain to allow for proper functional mobility as indicated by patients Functional Deficits. [x] Progressing: [] Met: [] Not Met: [] Adjusted  4. Patient will return to all ADL's and workout related functional activities without increased symptoms or restriction. [x] Progressing: [] Met: [] Not Met: [] Adjusted    ASSESSMENT:  corrected patient abduction isometric to focus on keeping shoulder at 0 degrees abduction. Overall Progression Towards Functional goals/ Treatment Progress Update:  [] Patient is progressing as expected towards functional goals listed. [] Progression is slowed due to complexities/Impairments listed. [] Progression has been slowed due to co-morbidities. [x] Plan just implemented, too soon to assess goals progression <30days   [] Goals require adjustment due to lack of progress  [] Patient is not progressing as expected and requires additional follow up with physician  [] Other    Prognosis for POC: [x] Good [] Fair  [] Poor      Patient requires continued skilled intervention: [x] Yes  [] No    Treatment/Activity Tolerance:  [x] Patient able to complete treatment  [] Patient limited by fatigue  [] Patient limited by pain    [] Patient limited by other medical complications  [] Other: Patient did well with PT manual therapy and SB rollout on table. Discussed with patient regarding her symptoms; presents as a muscle strain and without a concerning mechanism of injury unlikely she injured her repair. Advised patient to continue with original HEP and to continue to use her R arm as a appropriate avoiding pain. PROM and joint mobility felt WNL. No real pain with STM at bicep. Advised patient to call PT or doctor if continued concern (seeing  Next week).   Patient would continue to benefit from continued PT to improve R RTC and periscapular strength, ROM, and functional ability. PLAN: See eval  [x] Continue per plan of care [] Alter current plan (see comments above)  [] Plan of care initiated [] Hold pending MD visit [] Discharge      Electronically signed by:  Saul An, PT, MPT     Note: If patient does not return for scheduled/ recommended follow up visits, this note will serve as a discharge from care along with most recent update on progress.

## 2022-09-13 ENCOUNTER — HOSPITAL ENCOUNTER (OUTPATIENT)
Dept: PHYSICAL THERAPY | Age: 67
Setting detail: THERAPIES SERIES
End: 2022-09-13
Payer: MEDICARE

## 2022-09-13 ENCOUNTER — OFFICE VISIT (OUTPATIENT)
Dept: ORTHOPEDIC SURGERY | Age: 67
End: 2022-09-13

## 2022-09-13 VITALS — WEIGHT: 110 LBS | HEIGHT: 63 IN | BODY MASS INDEX: 19.49 KG/M2

## 2022-09-13 DIAGNOSIS — Z98.890 S/P ROTATOR CUFF SURGERY: Primary | ICD-10-CM

## 2022-09-13 PROCEDURE — 99024 POSTOP FOLLOW-UP VISIT: CPT | Performed by: ORTHOPAEDIC SURGERY

## 2022-09-13 NOTE — PROGRESS NOTES
no crepitus    Active Range of Motion: Forward Elevation 40-50    Passive Range of Motion:     Strength:  Weak belly press,    Special Tests:  No Phillip muscle deformity. Neurovascular: Sensation to light touch is intact, no motor deficits, palpable radial pulses 2+      Radiology:     Plain radiographs of the Right shoulder comprising 3 views: were obtained and reviewed in the office: Shows postsurgical changes from the rotator cuff repair. No anterior subluxation demonstrated on axillary view. Assessment :  Ms. Brook Garcia is a pleasant, 69 y.o. patient who underwent a right revision rotator cuff repair with collagen patch augmentation on 6/13/2022. She is 3 months postop, currently with pain concerning for a subscapularis tendon tear. Impression:  Encounter Diagnosis   Name Primary? S/P rotator cuff surgery Yes       Office Procedures:  Orders Placed This Encounter   Procedures    XR SHOULDER RIGHT (MIN 2 VIEWS)     Standing Status:   Future     Number of Occurrences:   1     Standing Expiration Date:   9/13/2023     Order Specific Question:   Reason for exam:     Answer:   pain    MRI SHOULDER RIGHT WO CONTRAST     Standing Status:   Future     Standing Expiration Date:   9/13/2023     Order Specific Question:   Reason for exam:     Answer:   Proscan       Treatment Plan:  Given how well she was doing and the drastic decrease in motion associated with pain, we recommend further workup. We will go ahead and order an MRI of the right shoulder to evaluate the subscapularis. She is agreeable to this. We would like to see her back in clinic to review those results and formulate a treatment plan. We will see Nik Salgado back in 1-2 weeks and/or as needed. All questions were answered to patient's satisfaction and She was encouraged to call with any further questions or concerns. Brook Garcia is in agreement with this plan.     9/13/2022  9:42 AM    Henrry Young ATC  [employer] [address]    During this examination, I, Franklin Romero, functioned as a scribe for Dr. Leonard Uribe. The history taking and physical examination were performed by Dr. Darshana Quevedo. All counseling during the appointment was performed between the patient and Dr. Darshana Quevedo. 9/13/22  ______________  I, Dr. Leonard Uribe, personally performed the services described in this documentation as described by Teresa Bhatia ATC in my presence, and it is both accurate and complete. Samedenise Quevedo MD, PhD  9/13/2022

## 2022-09-14 ENCOUNTER — APPOINTMENT (OUTPATIENT)
Dept: PHYSICAL THERAPY | Age: 67
End: 2022-09-14
Payer: MEDICARE

## 2022-09-14 NOTE — FLOWSHEET NOTE
[facility] and [address],  Sports Performance and Rehabilitation, [facility] [address]                  [address] [facility],5Th Floor        Jose Antonio Rodriguez  Phone:   Fax: [phone]        Date: 2022    Patient Name:  Surjit Mariee    :  1955  MRN: [id number]  Restrictions/Precautions: None of note   Medical/Treatment Diagnosis Information:  Diagnosis: Z98.890 (ICD-10-CM) - S/P rotator cuff surgery  Treatment Diagnosis: R Shoulder Pain H27.421  Insurance/Certification information:  PT Insurance Information: Medicare, 20%, no limit for visits  Physician Information:  Jamil Nava MD  Has the plan of care been signed (Y/N):        []  Yes  [x]  No     Date of Patient follow up with Physician:       Is this a Progress Report:     []  Yes  [x]  No        If Yes:  Date Range for reporting period:  Beginning 22  Ending    Progress report will be due (10 Rx or 30 days whichever is less): 61       Recertification will be due (POC Duration  / 90 days whichever is less): 22      Visit # Insurance Allowable Auth Required   In-person 15 BMN []  Yes []  No          Functional Scale: FOTO:     Date assessed:  22       Number of Comorbidities:  [x]0     []1-2    []3+    Latex Allergy:  [x]NO      []YES  Preferred Language for Healthcare:   [x]English       []other:      Pain level:  nr, pain elicited with R shld IR with elbow flexion     SUBJECTIVE:  \"just still sore\"    OBJECTIVE:   Observation:  mild global tenderness   Test measurements:   RESTRICTIONS/PRECAUTIONS: DOS: 22 s/p R RTC revision with collagen patch:   FWD AAROM to 90 (progress 10 deg per week till 140)  ER AAROM to 20 deg (progress 5 deg per week till 36)  Ok for rhomboid pinch and isometric abd    Exercises/Interventions:     Therapeutic Ex (71086)/NMR re-education (54476) Sets/sec Notes/CUES   Seated Scap Retraction    Table slides  Standing table slides with SB Highest was 115  Flexion + wipers   scap clocks (3-9; 6-12; 1:30-7:30; 10:30-4:30) Manual resistance for first two setups   Abduction isometrics    Std ball rolls 2 x 10 Blue SB on table   Bicep curls     Supine cane: overhead, er @ 40    Supine AA raises    Sidelying er    Wall walk     Pt Ed: POC, HEP, Role of PT, not to push ROM to aggressively early on, safe donning/doffing Manual Intervention ([phone])     stm of shoulder, light joint mobs for comfort 25' Deep prep at R bicep                    Access Code [facility]    -   8/11    Shoulder and Trunk Flexion at Table - 3-4 x daily - 7 x weekly - 1 sets - 10 reps - 5 hold    Therapeutic Exercise and NMR EXR  [x] (65216) Provided verbal/tactile cueing for activities related to strengthening, flexibility, endurance, ROM  for improvements in scapular, scapulothoracic and UE control with self care, reaching, carrying, lifting, house/yardwork, driving/computer work.    [] (75478) Provided verbal/tactile cueing for activities related to improving balance, coordination, kinesthetic sense, posture, motor skill, proprioception  to assist with  scapular, scapulothoracic and UE control with self care, reaching, carrying, lifting, house/yardwork, driving/computer work. Therapeutic Activities:    [x] (80924 or 86367) Provided verbal/tactile cueing for activities related to improving balance, coordination, kinesthetic sense, posture, motor skill, proprioception and motor activation to allow for proper function of scapular, scapulothoracic and UE control with self care, carrying, lifting, driving/computer work.      Home Exercise Program:    [x] (63659) Reviewed/Progressed HEP activities related to strengthening, flexibility, endurance, ROM of scapular, scapulothoracic and UE control with self care, reaching, carrying, lifting, house/yardwork, driving/computer work  [] (00052) Reviewed/Progressed HEP activities related to improving balance, coordination, kinesthetic sense, posture, Deficits. [x] Progressing: [] Met: [] Not Met: [] Adjusted  3. Patient will demonstrate an increase in Strength to 4+/5 globally without pain to allow for proper functional mobility as indicated by patients Functional Deficits. [x] Progressing: [] Met: [] Not Met: [] Adjusted  4. Patient will return to all ADL's and workout related functional activities without increased symptoms or restriction. [x] Progressing: [] Met: [] Not Met: [] Adjusted    ASSESSMENT:  corrected patient abduction isometric to focus on keeping shoulder at 0 degrees abduction. Overall Progression Towards Functional goals/ Treatment Progress Update:  [] Patient is progressing as expected towards functional goals listed. [] Progression is slowed due to complexities/Impairments listed. [] Progression has been slowed due to co-morbidities. [x] Plan just implemented, too soon to assess goals progression <30days   [] Goals require adjustment due to lack of progress  [] Patient is not progressing as expected and requires additional follow up with physician  [] Other    Prognosis for POC: [x] Good [] Fair  [] Poor      Patient requires continued skilled intervention: [x] Yes  [] No    Treatment/Activity Tolerance:  [x] Patient able to complete treatment  [] Patient limited by fatigue  [] Patient limited by pain    [] Patient limited by other medical complications  [] Other: Patient did well with PT manual therapy and SB rollout on table. Discussed with patient regarding her symptoms; presents as a muscle strain and without a concerning mechanism of injury unlikely she injured her repair. Advised patient to continue with original HEP and to continue to use her R arm as a appropriate avoiding pain. PROM and joint mobility felt WNL. No real pain with STM at bicep. Advised patient to call PT or doctor if continued concern (seeing  Next week).   Patient would continue to benefit from continued PT to improve R RTC and periscapular strength, ROM, and functional ability. PLAN: See eval  [x] Continue per plan of care [] Alter current plan (see comments above)  [] Plan of care initiated [] Hold pending MD visit [] Discharge      Electronically signed by:  Valorie Pi, PT, MPT     Note: If patient does not return for scheduled/ recommended follow up visits, this note will serve as a discharge from care along with most recent update on progress.

## 2022-09-19 ENCOUNTER — TELEPHONE (OUTPATIENT)
Dept: ORTHOPEDIC SURGERY | Age: 67
End: 2022-09-19

## 2022-09-19 NOTE — TELEPHONE ENCOUNTER
SHE IS WORRIED BECAUSE SHE HAS A TRIP TO [facility] PLANNED AND DOESN'T KNOW IF SHE SHOULD GO

## 2022-09-19 NOTE — TELEPHONE ENCOUNTER
General Question     Subject: PATIENT IS HAVING AN MRI TODAY AND IS WONDERING IF SOME ONE CAN READ IT BEFORE HER APPT WITH ZULEYMA. REQUESTING A CALL BACK.     Patient: Arsenio Chester Number: [phone]

## 2022-09-22 ENCOUNTER — APPOINTMENT (OUTPATIENT)
Dept: PHYSICAL THERAPY | Age: 67
End: 2022-09-22
Payer: MEDICARE

## 2022-09-26 ENCOUNTER — APPOINTMENT (OUTPATIENT)
Dept: PHYSICAL THERAPY | Age: 67
End: 2022-09-26
Payer: MEDICARE

## 2022-09-28 ENCOUNTER — OFFICE VISIT (OUTPATIENT)
Dept: ORTHOPEDIC SURGERY | Age: 67
End: 2022-09-28
Payer: MEDICARE

## 2022-09-28 VITALS — HEIGHT: 63 IN | WEIGHT: 110 LBS | BODY MASS INDEX: 19.49 KG/M2

## 2022-09-28 DIAGNOSIS — Z98.890 S/P ROTATOR CUFF SURGERY: Primary | ICD-10-CM

## 2022-09-28 DIAGNOSIS — M75.121 NONTRAUMATIC COMPLETE TEAR OF RIGHT ROTATOR CUFF: ICD-10-CM

## 2022-09-28 PROCEDURE — 99215 OFFICE O/P EST HI 40 MIN: CPT | Performed by: ORTHOPAEDIC SURGERY

## 2022-09-28 PROCEDURE — 3017F COLORECTAL CA SCREEN DOC REV: CPT | Performed by: ORTHOPAEDIC SURGERY

## 2022-09-28 PROCEDURE — 1090F PRES/ABSN URINE INCON ASSESS: CPT | Performed by: ORTHOPAEDIC SURGERY

## 2022-09-28 PROCEDURE — G8427 DOCREV CUR MEDS BY ELIG CLIN: HCPCS | Performed by: ORTHOPAEDIC SURGERY

## 2022-09-28 PROCEDURE — G8400 PT W/DXA NO RESULTS DOC: HCPCS | Performed by: ORTHOPAEDIC SURGERY

## 2022-09-28 PROCEDURE — G8420 CALC BMI NORM PARAMETERS: HCPCS | Performed by: ORTHOPAEDIC SURGERY

## 2022-09-28 PROCEDURE — 1036F TOBACCO NON-USER: CPT | Performed by: ORTHOPAEDIC SURGERY

## 2022-09-28 PROCEDURE — 1123F ACP DISCUSS/DSCN MKR DOCD: CPT | Performed by: ORTHOPAEDIC SURGERY

## 2022-09-28 PROCEDURE — 20610 DRAIN/INJ JOINT/BURSA W/O US: CPT | Performed by: ORTHOPAEDIC SURGERY

## 2022-09-28 RX ORDER — TRIAMCINOLONE ACETONIDE 40 MG/ML
80 INJECTION, SUSPENSION INTRA-ARTICULAR; INTRAMUSCULAR ONCE
Status: COMPLETED | OUTPATIENT
Start: 2022-09-28 | End: 2022-09-28

## 2022-09-28 RX ADMIN — TRIAMCINOLONE ACETONIDE 80 MG: 40 INJECTION, SUSPENSION INTRA-ARTICULAR; INTRAMUSCULAR at 11:37

## 2022-09-28 NOTE — PROGRESS NOTES
Chief Complaint    Shoulder Pain (F/U RIGHT SHOULDER MRI)      History of Present Illness:  Irwin Mcmahan is a pleasant, 69 y.o., female, here today for follow up of her right shoulder. The patient underwent right revision rotator cuff repair with collagen patch augmentation on 6/13/2022. She is 3+ months postop. Around 3 months postop she began experiencing pain and weakness in the shoulder after two consecutive days of riding a stationary bike. We did go ahead and order an MRI to evaluate for retear vs failure of the tendon to heal. She tolerated the study well and presents today with her sister to review those results. Pain Assessment  Location of Pain: Shoulder  Location Modifiers: Right  Severity of Pain: 8  Quality of Pain: Sharp  Duration of Pain: A few minutes  Frequency of Pain: Intermittent  Aggravating Factors: Other (Comment)  Limiting Behavior: Yes  Relieving Factors: Rest  Work-Related Injury: No  Are there other pain locations you wish to document?: No      Medical History:  Patient's medications, allergies, past medical, surgical, social and family histories were reviewed and updated as appropriate. Review of Systems  A 14 point review of systems was completed by the patient and is available in the media section of the scanned medical record and was reviewed on 9/28/2022. The review is negative with the exception of those things mentioned in the HPI and Past Medical History    Vital Signs: There were no vitals filed for this visit. General/Appearance: Alert and oriented and in no apparent distress. Skin:  There are no skin lesions, cellulitis, or extreme edema. The patient has warm and well-perfused Bilateral upper extremities with brisk capillary refill. Right Shoulder Exam:  Inspection: Incision portals are healing well. No gross deformities, no signs of infection. She does have some swelling accumulated about her shoulder.     Palpation: Tenderness over rotator cuff footprint    Active Range of Motion: Forward Elevation 40-50    Passive Range of Motion: Forward Elevation 90 with soft end feel    Strength: Not tested    Special Tests: No Phillip muscle deformity. Neurovascular: Sensation to light touch is intact, no motor deficits, palpable radial pulses 2+      Radiology:     No new XR obtained at this time. MRI Right shoulder: 9/19/22     CONCLUSION:   1. Rotator cuff repair with full depth complete retears of the supraspinatus and infraspinatus    tendons retracted to the level of the glenohumeral joint. 2. Superior humeral head decentering. 3. Chronically torn and retracted long head biceps tendon. 4. Moderate subscapularis tendinosis. 5. Mild volumetric and fatty atrophy of the supraspinatus, infraspinatus and superior fibers of    the subscapularis musculature. 6. Moderate glenohumeral osteoarthritis. Chronically torn labrum. 7. Moderate glenohumeral bursal effusion with synovitis. 8. Postsurgical changes within the deltoid musculature, humeral head and subcutaneous tissue. Assessment :  Ms. Doris Arndt is a pleasant, 69 y.o. patient who underwent a right revision rotator cuff repair back on 6/13/22 presently with recurrent tears of both the supra and infraspinatus tendons as demonstrated on MRI. She has several options moving forward but another primary rotator cuff repair is not one of these. Impression:  Encounter Diagnoses   Name Primary? S/P rotator cuff surgery Yes    Nontraumatic complete tear of right rotator cuff        Office Procedures:  Orders Placed This Encounter   Procedures    56765 - ME DRAIN/INJECT LARGE JOINT/BURSA       After discussing the risks and benefits of a corticosteroid injection, Lance did state an understanding and gave verbal consent to proceed.   After cleansing the injection site with Chlora-prep and using aseptic techniques,  2 CC of Kenalog 40mg/ml and 8 CC of 1% Naropin were injected in the right glenohumeral joint. She  tolerated the procedure well with no immediate adverse sequelae after the injection. A bandage was placed over the injection site. Appropriate post injections instructions were given to the patient. Treatment Plan: We were able to review pertinent imaging today with Doris Arndt . We discussed diagnosis and future treatment options in detail. Unfortunately the MRI demonstrates a large recurrent tear of the rotator cuff. We do feel a trial of conservative management is most appropriate. We recommend an intra-articular corticosteroid injection coupled with additional formal physical therapy to work on motion. Surgically she is a candidate for a reverse total shoulder replacement vs a subacromial balloon arthroplasty, however we would like to see how her motion improves in conservative treatment to determine which operation would best suit this patient. She is agreeable with this plan and we did proceed with the steroid injection today. Additionally we recommend she discuss with her PCP, Dr. Zohreh Stoner regarding her nutritional health. Dr. Michael Boss will personally speak with him regarding this matter. Lastly before proceeding with the injection we did attempt to aspirate the joint. Upon aspirating the patient became lethargic and light headed. She was offered a cola in clinic and after lying down for a few minutes she felt better. We then attempted to aspirate the shoulder again without being able to pull any fluid from the joint. We then proceeded with the corticosteroid injection. We will see Donnie Cline back in 4-6 weeks and/or as needed. All questions were answered to patient's satisfaction and She was encouraged to call with any further questions or concerns. Doris Arndt is in agreement with this plan. Greater than 50 minutes were expended on various aspects of today's visit.       9/28/2022  10:22 AM    Yahaira Turner ATC  [facility] and [address] [place] [address]    During this examination, I, Consuelo Oconnor, functioned as a scribe for Dr. Vivian Brenner. The history taking and physical examination were performed by Dr. Paul Harrison. All counseling during the appointment was performed between the patient and Dr. Paul Harrison. 9/28/22  ______________  I, Dr. Vivian Brenner, personally performed the services described in this documentation as described by Bayron Carlisle ATC in my presence, and it is both accurate and complete. Corrie Harrison MD, PhD  9/28/2022

## 2022-09-28 NOTE — LETTER
[facility] [address]  Phone: [phone]  Fax: [phone]    Esequiel Mcmanus MD        September 28, 2022     Patient: Jesenia Carmichael   YOB: 1955   Date of Visit: 9/28/2022       To Whom it May Concern:    Jesenia Carmichael was seen in my clinic on 9/28/2022. She underwent arthroscopic surgery on 6/13/22 and currently has a recurrent tear of the rotator cuff tendons. She is unable to move her arm and is not able to lift/push/pull. We recommend canceling with full reimbursement for her upcoming trip. If you have any questions or concerns, please don't hesitate to call.     Sincerely,       Esequiel Mcmanus MD

## 2022-09-28 NOTE — LETTER
Shoulder Elbow Rehabilitation Referral    Patient Name: Tal Phillips      YOB: 1955    Diagnosis:   1. S/P rotator cuff surgery    2. Nontraumatic complete tear of right rotator cuff        Precautions: CSI 9/28/2022     Post Op Instructions:  [] Continuous passive motion (CPM)  [x] Elbow range of motion  [x] Exercise in plane of scapula   []  Strengthening     [] Pulley and instruction    [x] Home exercise program (copy to patient)   [] Sling when arm at risk  [] Sling or brace at all times   [x] AAROM: Forward elevation              [x] AAROM: External rotation    [] Isometric external rotator strengthening [x] AAROM: internal rotation: up the back  [x] Isometric abductor strengthening  [x] AAROM: Internal abduction     [] Isometric internal rotator strengthening [x] AAROM: cross-body adduction             Stretching:     Strengthening:  [x] Four quadrant (FE, ER, IR, CBA)  [x] Rotator cuff (ER, IR, Abd)  [] Forward Elevation    [] External Rotators     [] External Rotation    [] Internal Rotators  [] Internal Rotation: up/back   [] Abductors     [] Internal Rotation: supine in abduction  [] Flexors  [] Cross-body abduction    [] Extensors  [] Pendulum (FE, Abd/Add, cw/ccw)  [x] Scapular Stabilizers   [] Wall-walking (FE, Abd)    [x] Shoulder shrugs     [] Table slides      [x] Rhomboid pinch  [] Elbow (flex, ext, pron, sup)    [] Lat.  Pull downs     [] Medial epicondylitis program    [] Forward punch   [] Lateral epicondylitis program    [] Internal rotators     [x] Progressive resistive exercises  [] Bench Press        [] Bench press plus  Activities:     [] Lateral pull-downs  [x] Rowing     [] Progressive two-hand supine press  [] Stepper/Exercise bike   [x] Biceps: curls/supination  [] Swimming  [] Water exercises    Modalities: PRN    Return to Sport:  [] Ultrasound     [] Plyometrics  [] Iontophoresis     [] Rhythmic stabilization  [] Moist heat     [] Core strengthening   [] Massage     [] Sports specific program:   [x] Cryotherapy      [] Electrical stimulation     [] Paraffin  [] Whirlpool  [] TENS    [x] Home exercise program (copy to patient). Perform exercises for:   15     minutes    2-3      times/day  [x] Supervised physical therapy  Frequency: []  1x week  [x] 2x week  [] 3x week  [] Other:   Duration: [] 2 weeks   [] 4 weeks  [x] 6 weeks  [] Other:     Additional Instructions:           Corrie Summers MD, PhD

## 2022-09-30 ENCOUNTER — APPOINTMENT (OUTPATIENT)
Dept: PHYSICAL THERAPY | Age: 67
End: 2022-09-30
Payer: MEDICARE

## 2022-10-03 ENCOUNTER — APPOINTMENT (OUTPATIENT)
Dept: PHYSICAL THERAPY | Age: 67
End: 2022-10-03
Payer: MEDICARE

## 2022-10-05 ENCOUNTER — TELEPHONE (OUTPATIENT)
Dept: ORTHOPEDIC SURGERY | Age: 67
End: 2022-10-05

## 2022-10-06 ENCOUNTER — APPOINTMENT (OUTPATIENT)
Dept: PHYSICAL THERAPY | Age: 67
End: 2022-10-06
Payer: MEDICARE

## 2022-10-11 ENCOUNTER — HOSPITAL ENCOUNTER (OUTPATIENT)
Dept: PHYSICAL THERAPY | Age: 67
Setting detail: THERAPIES SERIES
Discharge: HOME | End: 2022-10-11
Payer: MEDICARE

## 2022-10-11 ENCOUNTER — APPOINTMENT (OUTPATIENT)
Dept: PHYSICAL THERAPY | Age: 67
End: 2022-10-11
Payer: MEDICARE

## 2022-10-11 PROCEDURE — 97164 PT RE-EVAL EST PLAN CARE: CPT | Performed by: PHYSICAL THERAPIST

## 2022-10-11 PROCEDURE — 97140 MANUAL THERAPY 1/> REGIONS: CPT | Performed by: PHYSICAL THERAPIST

## 2022-10-16 NOTE — PROGRESS NOTES
[facility] and [address],  Sports Performance and Rehabilitation, [facility] [address]  [address] [facility],5[address]   Jose Antonio Rodriguez  Phone: [phone]  Fax: [phone]      Physical Therapy Re-Certification Plan of Care    Dear Dr Christopher Hameed  ,    We had the pleasure of treating the following patient for physical therapy services at [address] [place] [address]. A summary of our findings can be found in the updated assessment below. This includes our plan of care. If you have any questions or concerns regarding these findings, please do not hesitate to contact me at the office phone number checked above. Thank you for the referral.       Physician Signature:_______________________________Date:__________________  By signing above (or electronic signature), therapists plan is approved by physician      Patient: Tahira Webb   : 1955   MRN: [id number]  Referring Physician:        Evaluation Date: 10/11/2022     Medical Diagnosis Information:    Diagnosis: Z98.890 (ICD-10-CM) - S/P rotator cuff surgery  Treatment Diagnosis: R Shoulder Pain M25.511                                            Insurance information:       Date Range:  -10/12    Total visits:  20      G-Codes: (if applicable)       SUBJECTIVE:  \"need a reverse tsr in the spring\"      Pain Scale: 7/10  :     OBJECTIVE:   Impairments:    ROM:  decreased right shoulder prom by 30%; 10% after rx       Strength/Neuromuscular control:  N/a     MEDICARE CAP EXCEPTION DOCUMENTATION  I certify that this patient meets one of the below criteria necessary for becoming an exception to the Medicare cap on therapy services:  []  The patient has a condition that has a direct and significant impact on the need for therapy. (Significantly impacts the rate of recovery)  [x]  The patient has a complexity that has a direct and significant impact on the need for therapy.   (Significantly impacts the rate of recovery and is associated with a primary condition.)       []  The patient has associated variables that influence the amount of treatment to include:  Social support, self-efficacy/motivation, prognosis, time since onset/acuity. []  The patient has generalized musculoskeletal conditions or a condition affecting multiple sites that will have a direct impact on the rate of recovery. []  The patient had a prior episode of outpatient therapy during this calendar year for a different condition. []  The patient has a mental or cognitive disorder in addition to the condition being treated that will have a direct and significant impact on the rate of recovery. ASSESSMENT:    Response to Treatment:   - Patient is responding well to treatment and improvement is noted with regards to goals  - Patient should continue to improve in reasonable time if they continue HEP  - Patient has plateaued and is no longer responding to skilled PT intervention   - carrying, moving and handling objects.  - Decreased upper extremity functional strength and neuromuscular control - Reduced overall functional level with carrying /lifting  - Noted GHJ joint hypomobility- Reduced overall functional level with carrying /lifting   - Noted cervicothoracic joint hypomobility- Reduced overall functional level with carrying /lifting   - Pain/difficulty with driving and/or computer work- Reduced overall functional level   - Pain/difficulty associated with self care tasks- Reduced overall functional level and ADL status  - Pain/difficulty with lifting/reaching/carrying - Reduced overall functional level with carrying and lifting  - Pain/difficulty with household work- Reduced ADL status  - Unable to perform sport/recreational activity due to pain and dysfunction    Updated Classification:  - signs/symptoms consistent with post-surgical status including decreased ROM, strength and function.   - impaired joint mobility, motor function, muscle performance and range of motion associated with:  -- bony or soft tissue surgical procedure. (Pattern 4I)  - ligament or other connective tissue dysfunction. (Pattern 4D)  - localized inflammation. (Pattern 4E)    Prognosis/Rehab Potential:       - Good     Factors affecting rehab potential:  - associated co-morbidities    Tolerance of evaluation/treatment:     - Good     New/Updated Goals (if applicable):  [x] No change to goals established upon initial eval/last progress note:  New Goals:    GOALS:     Plan of Care:  [x] Continue Current Therapy Intervention    Frequency/Duration:  2 days per week for 12 Weeks:  Interventions:  1. Therapeutic exercise including: strength training, ROM, NMR and proprioception for the scapula, core and Upper extremity  2. Manual therapy as indicated including Dry Needling/IASTM, STM, PROM, Gr I-IV mobilizations, spinal mobilization/manipulation. 3. Modalities as needed including: thermal agents, E-stim, US, iontophoresis as indicated. 4. Patient education on joint protection, activity modification, progression of HEP.         Electronically signed by:  Otis Baxter PT, MPT

## 2022-10-16 NOTE — FLOWSHEET NOTE
[facility] and [address],  Sports Performance and Rehabilitation, [facility] [address]199 [address]                  793 [facility],5Th Floor        Jose Antonio Rodriguez  Phone:   Fax: [phone]        Date: 10/11/2022    Patient Name:  Jovita Garcia    :  1955  MRN: [id number]  Restrictions/Precautions: None of note   Medical/Treatment Diagnosis Information:  Diagnosis: Z98.890 (ICD-10-CM) - S/P rotator cuff surgery  Treatment Diagnosis: R Shoulder Pain D98.762  Insurance/Certification information:  PT Insurance Information: Medicare, 20%, no limit for visits  Physician Information:  Miranda Stanley MD  Has the plan of care been signed (Y/N):        []  Yes  [x]  No     Date of Patient follow up with Physician:       Is this a Progress Report:     []  Yes  [x]  No        If Yes:  Date Range for reporting period:  Beginning 22  Ending    Progress report will be due (10 Rx or 30 days whichever is less):        Recertification will be due (POC Duration  / 90 days whichever is less): 22      Visit # Insurance Allowable Auth Required   In-person 16 BMN []  Yes []  No          Functional Scale: FOTO:     Date assessed:  22       Number of Comorbidities:  [x]0     []1-2    []3+    Latex Allergy:  [x]NO      []YES  Preferred Language for Healthcare:   [x]English       []other:      Pain level:  nr, pain elicited with R shld IR with elbow flexion     SUBJECTIVE:  \"just still sore\"    OBJECTIVE:   Observation:  mild global tenderness   Test measurements:   RESTRICTIONS/PRECAUTIONS: DOS: 22 s/p R RTC revision with collagen patch:   FWD AAROM to 90 (progress 10 deg per week till 140)  ER AAROM to 20 deg (progress 5 deg per week till 36)  Ok for rhomboid pinch and isometric abd    Exercises/Interventions:     Therapeutic Ex (81916)/NMR re-education (83250) Sets/sec Notes/CUES   Seated Scap Retraction    Table slides  Standing table slides with SB Highest was 115  Flexion + wipers   scap clocks (3-9; 6-12; 1:30-7:30; 10:30-4:30) Manual resistance for first two setups   Abduction isometrics    Std ball rolls 2 x 10 Blue SB on table   Bicep curls     Supine cane: overhead, er @ 40    Supine AA raises    Sidelying er    Wall walk     Pt Ed: POC, HEP, Role of PT, not to push ROM to aggressively early on, safe donning/doffing      Table stretch    Wall walk  5 x 10\"'    4 x 10\" Manual Intervention ([id number])     stm of shoulder, light joint mobs for comfort 20' Deep prep at R bicep                    Access Code [facility]    -   8/11    Shoulder and Trunk Flexion at Table - 3-4 x daily - 7 x weekly - 1 sets - 10 reps - 5 hold    Therapeutic Exercise and NMR EXR  [x] (93882) Provided verbal/tactile cueing for activities related to strengthening, flexibility, endurance, ROM  for improvements in scapular, scapulothoracic and UE control with self care, reaching, carrying, lifting, house/yardwork, driving/computer work.    [] (47305) Provided verbal/tactile cueing for activities related to improving balance, coordination, kinesthetic sense, posture, motor skill, proprioception  to assist with  scapular, scapulothoracic and UE control with self care, reaching, carrying, lifting, house/yardwork, driving/computer work. Therapeutic Activities:    [x] (18525 or 43970) Provided verbal/tactile cueing for activities related to improving balance, coordination, kinesthetic sense, posture, motor skill, proprioception and motor activation to allow for proper function of scapular, scapulothoracic and UE control with self care, carrying, lifting, driving/computer work.      Home Exercise Program:    [x] (42039) Reviewed/Progressed HEP activities related to strengthening, flexibility, endurance, ROM of scapular, scapulothoracic and UE control with self care, reaching, carrying, lifting, house/yardwork, driving/computer work  [] (75790) Reviewed/Progressed HEP activities related to improving balance, coordination, kinesthetic sense, posture, motor skill, proprioception of scapular, scapulothoracic and UE control with self care, reaching, carrying, lifting, house/yardwork, driving/computer work      Manual Treatments:  PROM / STM / Oscillations-Mobs:  G-I, II, III, IV (PA's, Inf., Post.)  [x] (74209) Provided manual therapy to mobilize soft tissue/joints of cervical/CT, scapular GHJ and UE for the purpose of modulating pain, promoting relaxation,  increasing ROM, reducing/eliminating soft tissue swelling/inflammation/restriction, improving soft tissue extensibility and allowing for proper ROM for normal function with self care, reaching, carrying, lifting, house/yardwork, driving/computer work    Modalities:  Ice x 10 min    Charges  Timed Code Treatment Minutes: 20'   Total Treatment Minutes: 50'     [] EVAL (LOW) 22350   [] EVAL (MOD) 67792   [] EVAL (HIGH) 34013   [] RE-EVAL     [] VE(62000) x 1    [] IONTO  [] NMR (32201) x 1    [] VASO   [x] Manual (98998) x 1  [] Other:  [] TA x      [] Mech Traction (19655)  [] ES(attended) (55057)      [] ES (un) (20813):     GOALS:  Patient stated goal: To get fully recovered from surgery and back to exercising. [] Progressing: [] Met: [] Not Met: [] Adjusted    Therapist goals for Patient:   Short Term Goals: To be achieved in: 2 weeks  1. Independent in HEP and progression per patient tolerance, in order to prevent re-injury. [x] Progressing: [] Met: [] Not Met: [] Adjusted  2. Patient will have a decrease in pain to facilitate improvement in movement, function, and ADLs as indicated by Functional Deficits. [x] Progressing: [] Met: [] Not Met: [] Adjusted    Long Term Goals: To be achieved in: 12 weeks  1. Disability index score of 46 or more per FOTO to assist with reaching prior level of function. [x] Progressing: [] Met: [] Not Met: [] Adjusted  2.  Patient will demonstrate increased AROM to 150 degrees of forward flexion to allow for proper joint functioning as indicated by patients Functional Deficits. [x] Progressing: [] Met: [] Not Met: [] Adjusted  3. Patient will demonstrate an increase in Strength to 4+/5 globally without pain to allow for proper functional mobility as indicated by patients Functional Deficits. [x] Progressing: [] Met: [] Not Met: [] Adjusted  4. Patient will return to all ADL's and workout related functional activities without increased symptoms or restriction. [x] Progressing: [] Met: [] Not Met: [] Adjusted    ASSESSMENT:  corrected patient abduction isometric to focus on keeping shoulder at 0 degrees abduction. Overall Progression Towards Functional goals/ Treatment Progress Update:  [] Patient is progressing as expected towards functional goals listed. [] Progression is slowed due to complexities/Impairments listed. [] Progression has been slowed due to co-morbidities. [x] Plan just implemented, too soon to assess goals progression <30days   [] Goals require adjustment due to lack of progress  [] Patient is not progressing as expected and requires additional follow up with physician  [] Other    Prognosis for POC: [x] Good [] Fair  [] Poor      Patient requires continued skilled intervention: [x] Yes  [] No    Treatment/Activity Tolerance:  [x] Patient able to complete treatment  [] Patient limited by fatigue  [] Patient limited by pain    [] Patient limited by other medical complications  [] Other: Patient did well with PT manual therapy and SB rollout on table. Discussed with patient regarding her symptoms; presents as a muscle strain and without a concerning mechanism of injury unlikely she injured her repair. Advised patient to continue with original HEP and to continue to use her R arm as a appropriate avoiding pain. PROM and joint mobility felt WNL. No real pain with STM at bicep. Advised patient to call PT or doctor if continued concern (seeing  Next week).   Patient would continue to benefit from continued PT to improve R RTC and periscapular strength, ROM, and functional ability. PLAN: See eval  [x] Continue per plan of care [] Alter current plan (see comments above)  [] Plan of care initiated [] Hold pending MD visit [] Discharge      Electronically signed by:  Parker De Jesus, PT, MPT     Note: If patient does not return for scheduled/ recommended follow up visits, this note will serve as a discharge from care along with most recent update on progress.

## 2022-10-18 ENCOUNTER — HOSPITAL ENCOUNTER (OUTPATIENT)
Dept: PHYSICAL THERAPY | Age: 67
Setting detail: THERAPIES SERIES
Discharge: HOME | End: 2022-10-18
Payer: MEDICARE

## 2022-10-18 PROCEDURE — 97140 MANUAL THERAPY 1/> REGIONS: CPT | Performed by: PHYSICAL THERAPIST

## 2022-10-23 NOTE — FLOWSHEET NOTE
[facility] and [address],  Sports Performance and Rehabilitation, [facility] [address]199 [address]                  793 [facility],5Th Floor        Jose Antonio Rodriguez [place]  Phone:   Fax: [phone]        Date: 10/18/2022    Patient Name:  Arthur Kanner    :  1955  MRN: [id number]  Restrictions/Precautions: None of note   Medical/Treatment Diagnosis Information:  Diagnosis: Z98.890 (ICD-10-CM) - S/P rotator cuff surgery  Treatment Diagnosis: R Shoulder Pain P51.346  Insurance/Certification information:  PT Insurance Information: Medicare, 20%, no limit for visits  Physician Information:  Harman Carrel, MD  Has the plan of care been signed (Y/N):        []  Yes  [x]  No     Date of Patient follow up with Physician:       Is this a Progress Report:     []  Yes  [x]  No        If Yes:  Date Range for reporting period:  Beginning 22  Ending    Progress report will be due (10 Rx or 30 days whichever is less): 3/86/70       Recertification will be due (POC Duration  / 90 days whichever is less): 22      Visit # Insurance Allowable Auth Required   In-person 17 BMN []  Yes []  No          Functional Scale: FOTO:     Date assessed:  22       Number of Comorbidities:  [x]0     []1-2    []3+    Latex Allergy:  [x]NO      []YES  Preferred Language for Healthcare:   [x]English       []other:      Pain level:  nr, pain elicited with R shld IR with elbow flexion     SUBJECTIVE:  \"I am feeling ok. Mario Le Mario Le \"    OBJECTIVE:   Observation:  3-/5 flexion   Test measurements:   RESTRICTIONS/PRECAUTIONS: DOS: 22 s/p R RTC revision with collagen patch:   FWD AAROM to 90 (progress 10 deg per week till 140)  ER AAROM to 20 deg (progress 5 deg per week till 36)  Ok for rhomboid pinch and isometric abd    Exercises/Interventions:     Therapeutic Ex (03900)/NMR re-education (72548) Sets/sec Notes/CUES   Seated Scap Retraction    Table slides  Standing table slides with SB Highest was 115  Flexion + wipers   scap clocks (3-9; 6-12; 1:30-7:30; 10:30-4:30) Manual resistance for first two setups   Abduction isometrics    Std ball rolls 2 x 10 Blue SB on table   Bicep curls     Supine cane: overhead, er @ 40    Supine AA raises    Sidelying er    Wall walk     Pt Ed: POC, HEP, Role of PT, not to push ROM to aggressively early on, safe donning/doffing      Table stretch    Wall walk  5 x 10\"'    4 x 10\" Manual Intervention ([id number])     stm of shoulder, light joint mobs for comfort 25' Deep prep at R bicep                    Access Code [facility]    -   8/11    Shoulder and Trunk Flexion at Table - 3-4 x daily - 7 x weekly - 1 sets - 10 reps - 5 hold    Therapeutic Exercise and NMR EXR  [x] (20966) Provided verbal/tactile cueing for activities related to strengthening, flexibility, endurance, ROM  for improvements in scapular, scapulothoracic and UE control with self care, reaching, carrying, lifting, house/yardwork, driving/computer work.    [] (40531) Provided verbal/tactile cueing for activities related to improving balance, coordination, kinesthetic sense, posture, motor skill, proprioception  to assist with  scapular, scapulothoracic and UE control with self care, reaching, carrying, lifting, house/yardwork, driving/computer work. Therapeutic Activities:    [x] (87829 or 50465) Provided verbal/tactile cueing for activities related to improving balance, coordination, kinesthetic sense, posture, motor skill, proprioception and motor activation to allow for proper function of scapular, scapulothoracic and UE control with self care, carrying, lifting, driving/computer work.      Home Exercise Program:    [x] (16317) Reviewed/Progressed HEP activities related to strengthening, flexibility, endurance, ROM of scapular, scapulothoracic and UE control with self care, reaching, carrying, lifting, house/yardwork, driving/computer work  [] (24498) Reviewed/Progressed HEP activities related to improving balance, coordination, kinesthetic sense, posture, motor skill, proprioception of scapular, scapulothoracic and UE control with self care, reaching, carrying, lifting, house/yardwork, driving/computer work      Manual Treatments:  PROM / STM / Oscillations-Mobs:  G-I, II, III, IV (PA's, Inf., Post.)  [x] (25248) Provided manual therapy to mobilize soft tissue/joints of cervical/CT, scapular GHJ and UE for the purpose of modulating pain, promoting relaxation,  increasing ROM, reducing/eliminating soft tissue swelling/inflammation/restriction, improving soft tissue extensibility and allowing for proper ROM for normal function with self care, reaching, carrying, lifting, house/yardwork, driving/computer work    Modalities:  Ice x 10 min    Charges  Timed Code Treatment Minutes: 40'   Total Treatment Minutes: 40'     [] EVAL (LOW) 97975   [] EVAL (MOD) 51095   [] EVAL (HIGH) 42214   [] RE-EVAL     [] DL(21327)   [] IONTO  [] NMR (20929) x 1    [] VASO   [x] Manual (96045) x 2  [] Other:  [] TA x      [] Mech Traction (37155)  [] ES(attended) (51153)      [] ES (un) (96448):     GOALS:  Patient stated goal: To get fully recovered from surgery and back to exercising. [] Progressing: [] Met: [] Not Met: [] Adjusted    Therapist goals for Patient:   Short Term Goals: To be achieved in: 2 weeks  1. Independent in HEP and progression per patient tolerance, in order to prevent re-injury. [x] Progressing: [] Met: [] Not Met: [] Adjusted  2. Patient will have a decrease in pain to facilitate improvement in movement, function, and ADLs as indicated by Functional Deficits. [x] Progressing: [] Met: [] Not Met: [] Adjusted    Long Term Goals: To be achieved in: 12 weeks  1. Disability index score of 46 or more per FOTO to assist with reaching prior level of function. [x] Progressing: [] Met: [] Not Met: [] Adjusted  2.  Patient will demonstrate increased AROM to 150 degrees of forward flexion to allow for proper joint functioning as indicated by patients Functional Deficits. [x] Progressing: [] Met: [] Not Met: [] Adjusted  3. Patient will demonstrate an increase in Strength to 4+/5 globally without pain to allow for proper functional mobility as indicated by patients Functional Deficits. [x] Progressing: [] Met: [] Not Met: [] Adjusted  4. Patient will return to all ADL's and workout related functional activities without increased symptoms or restriction. [x] Progressing: [] Met: [] Not Met: [] Adjusted    ASSESSMENT:  corrected patient abduction isometric to focus on keeping shoulder at 0 degrees abduction. Overall Progression Towards Functional goals/ Treatment Progress Update:  [] Patient is progressing as expected towards functional goals listed. [] Progression is slowed due to complexities/Impairments listed. [] Progression has been slowed due to co-morbidities. [x] Plan just implemented, too soon to assess goals progression <30days   [] Goals require adjustment due to lack of progress  [] Patient is not progressing as expected and requires additional follow up with physician  [] Other    Prognosis for POC: [x] Good [] Fair  [] Poor      Patient requires continued skilled intervention: [x] Yes  [] No    Treatment/Activity Tolerance:  [x] Patient able to complete treatment  [] Patient limited by fatigue  [] Patient limited by pain    [] Patient limited by other medical complications  [] Other:   PLAN: See eval  [x] Continue per plan of care [] Alter current plan (see comments above)  [] Plan of care initiated [] Hold pending MD visit [] Discharge      Electronically signed by:  Frankey Bellis, PT, MPT     Note: If patient does not return for scheduled/ recommended follow up visits, this note will serve as a discharge from care along with most recent update on progress.

## 2022-10-26 ENCOUNTER — OFFICE VISIT (OUTPATIENT)
Dept: ORTHOPEDIC SURGERY | Age: 67
End: 2022-10-26
Payer: MEDICARE

## 2022-10-26 VITALS — HEIGHT: 63 IN | BODY MASS INDEX: 19.49 KG/M2 | WEIGHT: 110 LBS

## 2022-10-26 DIAGNOSIS — M75.121 COMPLETE TEAR OF RIGHT ROTATOR CUFF, UNSPECIFIED WHETHER TRAUMATIC: ICD-10-CM

## 2022-10-26 DIAGNOSIS — M96.89 FAILURE OF PREVIOUS ROTATOR CUFF REPAIR: Primary | ICD-10-CM

## 2022-10-26 PROCEDURE — G8484 FLU IMMUNIZE NO ADMIN: HCPCS | Performed by: ORTHOPAEDIC SURGERY

## 2022-10-26 PROCEDURE — G8427 DOCREV CUR MEDS BY ELIG CLIN: HCPCS | Performed by: ORTHOPAEDIC SURGERY

## 2022-10-26 PROCEDURE — 1123F ACP DISCUSS/DSCN MKR DOCD: CPT | Performed by: ORTHOPAEDIC SURGERY

## 2022-10-26 PROCEDURE — 99213 OFFICE O/P EST LOW 20 MIN: CPT | Performed by: ORTHOPAEDIC SURGERY

## 2022-10-26 PROCEDURE — G8420 CALC BMI NORM PARAMETERS: HCPCS | Performed by: ORTHOPAEDIC SURGERY

## 2022-10-26 PROCEDURE — 3017F COLORECTAL CA SCREEN DOC REV: CPT | Performed by: ORTHOPAEDIC SURGERY

## 2022-10-26 PROCEDURE — G8400 PT W/DXA NO RESULTS DOC: HCPCS | Performed by: ORTHOPAEDIC SURGERY

## 2022-10-26 PROCEDURE — 1036F TOBACCO NON-USER: CPT | Performed by: ORTHOPAEDIC SURGERY

## 2022-10-26 PROCEDURE — 1090F PRES/ABSN URINE INCON ASSESS: CPT | Performed by: ORTHOPAEDIC SURGERY

## 2022-10-26 NOTE — PROGRESS NOTES
12 [facility]  History and Physical  Shoulder Pain    Date:  10/27/2022    Name:  Jacqueline Diop  Address:  [address]    :  1955      Age:   69 y.o.    SSN:  [id number]      Medical Record Number:  [id number]    Reason for Visit:    Shoulder Pain (F/U RIGHT SHOULDER)      HPI:   Jacqueline Diop is a 69 y.o. female who presents to our office today for follow up of the right shoulder pain. She underwent a right revision rotator cuff repair with collagen patch augmentation on 2022. She was more than 3 months postop when she began having a new onset of pain and weakness after 2-second of days of riding a stationary bike. Patient was found to later have failure of the tendon to heal with a follow-up MRI. The patient was given a corticosteroid injection at the last visit. Today the patient reports this injection did help with some of the symptoms of discomfort but she is not able to lift up her shoulder overhead at all. She has considered her surgical options including a balloon arthroplasty and a reverse total shoulder arthroplasty. She reports that she is leaning more towards a reverse total shoulder replacement for a more definitive solution. Pain Assessment  Location of Pain: Shoulder  Location Modifiers: Right  Severity of Pain: 0  Quality of Pain: Sharp  Duration of Pain: A few hours  Frequency of Pain: Intermittent  Aggravating Factors: Other (Comment), Exercise, Straightening, Stretching  Limiting Behavior: Yes  Relieving Factors: Rest, Ice  Work-Related Injury: No  Are there other pain locations you wish to document?: No        Review of Systems:  A 14 point review of systems available in the scanned medical record as documented by the patient and reviewed on 10/27/2022. The review is negative with the exception of those things mentioned in the History of Present Illness and Past Medical History.       Past Medical History:  Patient's medications, allergies, past medical, surgical, social and family histories were reviewed and updated as appropriate. Allergies: Allergies   Allergen Reactions    Percocet [Oxycodone-Acetaminophen] Nausea And Vomiting     Severe n/v; pt is able to tolerate Vicodin/hydrocodone    Sulfa Antibiotics Rash     High temp and rash -- was in her 20's when reaction occurred       Physical Exam:  There were no vitals filed for this visit. General: Jacqueline Diop is a healthy and well appearing 69 y.o. female who is sitting comfortably in our office in acute distress. Skin:  There are no skin lesions, cellulitis, or extreme edema. The patient has warm and well-perfused Bilateral upper extremities with brisk capillary refill. Eyes: Extra-ocular muscles intact  Mouth: Oral mucosa moist. No perioral lesions  Pulm: Respirations unlabored and regular. Neuro: Alert and oriented x3    right Shoulder Exam:  Inspection:  No gross deformities, no signs of infection. Palpation:  There is subacromial crepitus. Tenderness to palpation over the rotator cuff footprint. Active Range of Motion: Forward elevation of 60, abduction of 40, external rotation with elbow at the side 35, internal rotation to the back is L1    Passive Range of Motion: Passively forward elevation can be further increased to 120 with a soft end point. Strength: External rotation with resistance with elbow at the side -4/5, internal rotation with resistance with elbow at the side 4/5    Special Tests: Negative external rotation lag, no Phillip muscle deformity. Neurovascular: Sensation to light touch is intact, no motor deficits, palpable radial pulses 2+    Additional Examinations:    Examination of the contralateral extremity does not show any tenderness, deformity or injury. Range of motion is unremarkable. There is no gross instability. There are no rashes, ulcerations or lesions.  Strength and tone are normal.      Radiographic:  X ray not obtained. MRI Right shoulder: 9/19/22      CONCLUSION:   1. Rotator cuff repair with full depth complete retears of the supraspinatus and infraspinatus    tendons retracted to the level of the glenohumeral joint. 2. Superior humeral head decentering. 3. Chronically torn and retracted long head biceps tendon. 4. Moderate subscapularis tendinosis. 5. Mild volumetric and fatty atrophy of the supraspinatus, infraspinatus and superior fibers of    the subscapularis musculature. 6. Moderate glenohumeral osteoarthritis. Chronically torn labrum. 7. Moderate glenohumeral bursal effusion with synovitis. 8. Postsurgical changes within the deltoid musculature, humeral head and subcutaneous tissue. Assessment:  Leo Sanders is a 69 y.o. female who underwent a right revision rotator cuff repair with collagen patch augmentation on June 13, 2022 currently with recurrent tears of both the supraspinatus and infraspinatus. This was found to be irreparable and she not a candidate for any rotator cuff repair or soft tissue procedure. She is a candidate for a reverse total shoulder arthroplasty    Impression:  Encounter Diagnoses   Name Primary? Failure of previous rotator cuff repair Yes    Complete tear of right rotator cuff, unspecified whether traumatic        Office Procedures:  No orders of the defined types were placed in this encounter. Plan:   Leo Sanders has an irreparable rotator cuff tear was not able to restore her movement despite the corticosteroid injection and physical therapy. At this point we feel that her next best option will be a reverse total shoulder arthroplasty. This is not something she would need to rush into and can do this at her own time. Patient would like to go on vacation in January and plan for surgery sometime in February 2023.   We did discuss the surgery in full detail along with risks and benefits and postoperative recovery. Additionally we discussed with her that her nutritional status will also need to be discussed with her primary care physician and he will need to get clearance for her surgical procedure. Suhail Markham will follow up as needed. She was in agreement with this plan and all questions were answered to the patient's satisfaction. She was encouraged to call with any questions. 10/27/2022  12:56 PM      Kiki Guerrero PA-C  Orthopaedic Sports Medicine Physician Assistant    During this examination, I, Kiki Guerrero PA-C, functioned as a scribe for Dr. Gigi Emmanuel. This dictation was performed with a verbal recognition program (DRAGON) and it was checked for errors. It is possible that there are still dictated errors within this office note. If so, please bring any errors to my attention for an addendum. All efforts were made to ensure that this office note is accurate.  ___________________  I, Dr. Gigi Emmanuel, personally performed the services described in this documentation as described by Kiki Guerrero PA-C in my presence, and it is both accurate and complete. Corrie Summers MD, PhD  10/26/2022

## 2023-01-03 ENCOUNTER — TELEPHONE (OUTPATIENT)
Dept: ORTHOPEDIC SURGERY | Age: 68
End: 2023-01-03

## 2023-01-03 NOTE — TELEPHONE ENCOUNTER
Surgery and/or Procedure Scheduling     Contact Name: Noreen Mane Request: RT [facility]  Patient Contact Number: [phone]    PATIENT IS REQ A RETURN CALL TO SCHEDULE SX FOR MARCH.     RETURN CALL TO ABOVE NUMBER

## 2023-01-04 NOTE — TELEPHONE ENCOUNTER
PATIENT CALLED TO SCHEDULE SX. PLS SEE PREVIOUS MESSAGE. PATIENT STATES THAT SHE HAS NOT HEARD FROM ANYONE.  PLS CALL TO ASSIST

## 2023-02-22 ENCOUNTER — TELEPHONE (OUTPATIENT)
Dept: ORTHOPEDIC SURGERY | Age: 68
End: 2023-02-22

## 2023-02-28 ENCOUNTER — TELEPHONE (OUTPATIENT)
Dept: ORTHOPEDIC SURGERY | Age: 68
End: 2023-02-28

## 2023-02-28 NOTE — TELEPHONE ENCOUNTER
Orthopedic Nurse Navigator Summary    Patient Name:  Kristi Singleton  Anticipated Date of Surgery:  03/13/23  Attended Pre-op Education Class:  Video sent to patient email  PCP: Taj Dee MD  Date of PCP visit for H&P: 03/06/23  Is patient in a Pain Management program:  Review of Medical history reveals history of: Osteopenia, Chronic hyponatremia, Colectomy 2009    Critical Lab Values  - Hemoglobin (g/dL):  Date: Value   - Hematocrit(%): Date:   Value   - HgbA1C:  Date:  Value  - Albumin:  Date:   Value   - BUN:  Date:   Value   - Creatinine:  Date:   Value     Coronary Artery Disease/HTN/CHF history: No  Cardiologist:  Cardiac clearance necessary:  No  Date of cardiac clearance appt:  Final Cardiac recommendations: On any anticoagulation: Aspirin 81mg QD    Diabetes History:  No  Most recent HgbA1C:  Pulmonary:  COPD/Emphysema/Use of home oxygen: No  Alcohol use: No    BMI greater than 40 at time of scheduling: Additional medical concerns: Additional recommendations for above concerns:  Attended Pre-Hab program:    Anticipated Discharge Disposition:  Home with OPT  Who will be with patient at home following discharge:  Her sister is going to stay with her for one week.   Equipment patient already has:  sling  Bedroom on first or second floor:    Bathroom on first or second floor:    Weight bearing status:  elizabeth ALBERTO  Pre-op ambulatory status:   Number of entry steps:    Caregiver assistance:  full time    Haroon Lea RN  Date:   02/28/23

## 2023-03-01 ENCOUNTER — OFFICE VISIT (OUTPATIENT)
Dept: ORTHOPEDIC SURGERY | Age: 68
End: 2023-03-01
Payer: MEDICARE

## 2023-03-01 VITALS — HEIGHT: 63 IN | WEIGHT: 110 LBS | BODY MASS INDEX: 19.49 KG/M2

## 2023-03-01 DIAGNOSIS — M75.121 COMPLETE TEAR OF RIGHT ROTATOR CUFF, UNSPECIFIED WHETHER TRAUMATIC: ICD-10-CM

## 2023-03-01 DIAGNOSIS — M96.89 FAILURE OF PREVIOUS ROTATOR CUFF REPAIR: Primary | ICD-10-CM

## 2023-03-01 PROCEDURE — G8427 DOCREV CUR MEDS BY ELIG CLIN: HCPCS | Performed by: ORTHOPAEDIC SURGERY

## 2023-03-01 PROCEDURE — G8420 CALC BMI NORM PARAMETERS: HCPCS | Performed by: ORTHOPAEDIC SURGERY

## 2023-03-01 PROCEDURE — G8400 PT W/DXA NO RESULTS DOC: HCPCS | Performed by: ORTHOPAEDIC SURGERY

## 2023-03-01 PROCEDURE — 3017F COLORECTAL CA SCREEN DOC REV: CPT | Performed by: ORTHOPAEDIC SURGERY

## 2023-03-01 PROCEDURE — G8484 FLU IMMUNIZE NO ADMIN: HCPCS | Performed by: ORTHOPAEDIC SURGERY

## 2023-03-01 PROCEDURE — 99214 OFFICE O/P EST MOD 30 MIN: CPT | Performed by: ORTHOPAEDIC SURGERY

## 2023-03-01 PROCEDURE — 1090F PRES/ABSN URINE INCON ASSESS: CPT | Performed by: ORTHOPAEDIC SURGERY

## 2023-03-01 PROCEDURE — 1123F ACP DISCUSS/DSCN MKR DOCD: CPT | Performed by: ORTHOPAEDIC SURGERY

## 2023-03-01 PROCEDURE — 1036F TOBACCO NON-USER: CPT | Performed by: ORTHOPAEDIC SURGERY

## 2023-03-01 NOTE — PROGRESS NOTES
[facility]  History and Physical  Shoulder Pain    Date:  3/1/2023    Name:  Fatemeh Vigil  Address:  [address]    :  1955      Age:   69 y.o.    SSN:  [id number]      Medical Record Number:  [id number]    Reason for Visit:    Shoulder Pain (PRE OP RIGHT SHOULDER)      HPI:   Fatemeh Vigil is a 69 y.o. female who presents to our office today for follow up of the right shoulder problem. She has a history of undergoing a right revision rotator cuff repair with collagen patch augmentation on 2022. She unfortunately found to have a substantial re-tear, extending into the subscapularis. She was told last year that her next best option would be a reverse shoulder replacement last year but was not ready for surgery at that time. She reports she is still in a lot of pain and is unable to lift her arm overhead. Patient reports she has just been managing her symptoms on her own. She feels that her shoulder function is extremely poor and would like to proceed with the surgery. She does have a lot of questions regarding the recovery process. She would like to go over these today    Pain Assessment  Location of Pain: Shoulder  Location Modifiers: Right  Severity of Pain: 5  Quality of Pain: Aching  Duration of Pain: Persistent  Frequency of Pain: Constant  Aggravating Factors: Other (Comment), Straightening, Stretching, Exercise  Limiting Behavior: Yes  Relieving Factors: Rest  Work-Related Injury: No  Are there other pain locations you wish to document?: No        Review of Systems:  A 14 point review of systems available in the scanned medical record as documented by the patient and reviewed on 3/1/2023. The review is negative with the exception of those things mentioned in the History of Present Illness and Past Medical History.       Past Medical History:  Patient's medications, allergies, past medical, surgical, social and family histories were reviewed and updated as appropriate. Allergies: Allergies   Allergen Reactions    Percocet [Oxycodone-Acetaminophen] Nausea And Vomiting     Severe n/v; pt is able to tolerate Vicodin/hydrocodone    Sulfa Antibiotics Rash     High temp and rash -- was in her 20's when reaction occurred       Physical Exam:  There were no vitals filed for this visit. General: Tereso Lindsey is a healthy and well appearing 69 y.o. female who is sitting comfortably in our office in acute distress. Skin:  There are no skin lesions, cellulitis, or extreme edema. The patient has warm and well-perfused Bilateral upper extremities with brisk capillary refill. Eyes: Extra-ocular muscles intact  Mouth: Oral mucosa moist. No perioral lesions  Pulm: Respirations unlabored and regular. Neuro: Alert and oriented x3    right Shoulder Exam:  Inspection:  No gross deformities, no signs of infection. Palpation:  There is subacromial crepitus. Tenderness to palpation over the rotator cuff footprint. Active Range of Motion: She is essentially pseudoparalytic. Forward elevation of 30, abduction of 30, external rotation with elbow at the side 35, internal rotation to the back is L1    Passive Range of Motion: Passively forward elevation can be further increased to 130 with a soft end point. Strength: External rotation with resistance with elbow at the side -4/5, internal rotation with resistance with elbow at the side 4/5, 3/5 champagne toast test.     Special Tests: Negative external rotation lag, no Phillip muscle deformity. Neurovascular: Sensation to light touch is intact, no motor deficits, palpable radial pulses 2+    Additional Examinations:    Examination of the contralateral extremity does not show any tenderness, deformity or injury. Range of motion is unremarkable. There is no gross instability. There are no rashes, ulcerations or lesions.  Strength and tone are normal.      Radiographic:  X ray not obtained. MRI Right shoulder: 9/19/22      CONCLUSION:   1. Rotator cuff repair with full depth complete retears of the supraspinatus and infraspinatus    tendons retracted to the level of the glenohumeral joint. 2. Superior humeral head decentering. 3. Chronically torn and retracted long head biceps tendon. 4. Moderate subscapularis tendinosis. 5. Mild volumetric and fatty atrophy of the supraspinatus, infraspinatus and superior fibers of    the subscapularis musculature. 6. Moderate glenohumeral osteoarthritis. Chronically torn labrum. 7. Moderate glenohumeral bursal effusion with synovitis. 8. Postsurgical changes within the deltoid musculature, humeral head and subcutaneous tissue. Assessment:  Marina Fishman is a 69 y.o. female who underwent a right revision rotator cuff repair with collagen patch augmentation on June 13, 2022 currently with irreparable rotator cuff tear. She is a candidate for a reverse total shoulder arthroplasty    Impression:  Encounter Diagnoses   Name Primary? Failure of previous rotator cuff repair Yes    Complete tear of right rotator cuff, unspecified whether traumatic        Office Procedures:  No orders of the defined types were placed in this encounter. Plan:   Marina Fishman has an irreparable rotator cuff tear and is essentially pseudoparalytic with the right upper extremity. She would be an excellent candidate for a right reverse total shoulder arthroplasty. She feels that she is no mentally prepared to have this surgery. We discussed risks and benefits and postoperative recovery. The patient already has a sling from her prior surgery. She was asked to bring it with her on the day of surgery. Risks, benefits and potential complications of total shoulder arthroplasty surgery were discussed with the patient.   Risks discussed include but are not limited to bleeding, infection, anesthetic risk, injury to nerves and blood vessels, deep vein thrombosis, residual stiffness and weakness, and the need for revision surgery. The patient also understands that anesthetic risks include cardiopulmonary issues, drug reactions and even death. The patient voices an understanding of the importance of physical therapy and home exercises after surgery. All questions were answered and written informed consent for surgery was obtained today. Kayla Vasquez will need to be cleared medically for the procedure. Otherwise, she is ready for her surgery. Kayla Vasquez will follow up as needed. She was in agreement with this plan and all questions were answered to her satisfaction. She was encouraged to call with any questions. 3/1/2023  5:19 PM      Hernan Crawford PA-C  Orthopaedic Sports Medicine Physician Assistant    During this examination, I, Hernan Crawford PA-C, functioned as a scribe for Dr. Altagracia Steward. This dictation was performed with a verbal recognition program (DRAGON) and it was checked for errors. It is possible that there are still dictated errors within this office note. If so, please bring any errors to my attention for an addendum. All efforts were made to ensure that this office note is accurate.  ___________________  I, Dr. Altagracia Steward, personally performed the services described in this documentation as described by Hernan Crawford PA-C in my presence, and it is both accurate and complete. Corrie Bailey MD, PhD  3/1/2023

## 2023-03-09 NOTE — PROGRESS NOTES
PRE-OP INSTRUCTIONS FOR SURGICAL PATIENTS          Our Pre-admission Testing Nurses tried and were unable to reach you today. Please read the attached instructions if you did not listen to your voicemail. Follow all instructions provided to you from your surgeon's office, including your ARRIVAL TIME. Arrange for someone to drive you home and be with you for the first 24 hours after discharge. NOTE: at this time ONLY 2 ADULTS may accompany you   One person encouraged to stay at hospital entire time if outpatient surgery    Enter the MAIN entrance located on [address] and report to the surgical desk on the LEFT side of the lobby. Please park in the parking garage or there is free ROBAUTO available after 7am for your use. Bring your insurance card & photo ID with you to register. Bring your medication list with you with dose and frequency listed (including over the counter medications)  Contact your ordering physician/surgeon for medication instructions as soon as possible, especially if taking blood thinners, aspirin, heart, or diabetic medication. Bariatric surgical patients need to call your surgeon if on diabetic medications (as some may need to be stopped 1-week preop)  A Pre-Surgical History and Physical MUST be completed WITHIN 30 DAYS OR LESS prior to your procedure by your Physician or an Urgent Care. DR. Shanna Wolff OFFICE  Metker Jenks PAPERWORK IF GIVEN TO YOU BY PCP   DO NOT EAT ANYTHING 8 hours prior to arrival for surgery. You may have sips of WATER ONLY (up to 8 ounces) 4 hours prior to your arrival for surgery. Then nothing further 4 hours prior to arriving at hospital.     No gum, candy, mints, or ice chips day of procedure. Please refrain from drinking alcohol the day before or day of your procedure. Do not use any recreational marijuana at least 24 hours or street drugs (heroin, cocaine) at minimum 5 days prior to your procedure.    Please do not smoke the day of your procedure. Dress in loose, comfortable clothing appropriate for redressing after your procedure. BRING AN DEVISE GIVEN TO YOU BY SURGEON OFFICE   DO NOT SHAVE RIGHT UNDERARM FOR 3 DAYS PRIOR TO SURGERY   Do not wear jewelry (including body piercings), make-up, fingernail polish, lotion, powders, or metal hairclips. Contacts, glasses, dentures, and hearing aids will need to be removed prior to surgery. Bring your case(s) to protect them while you are in surgery. If you use a CPAP, please bring it with you on the day of your procedure. Do not shave or wax for 72 hours prior to procedure near your operative site. Leave all other valuables at home. IF there is a change in your physical condition for some reason, such as: a cold, fever, rash, cuts, sores, or any other infection, especially near your surgical site, contact your surgeon's office immediately. SEE ATTACHED INSTRUCTIONS FOR HIBICLENS BATHING INSTRUCTIONS AND QR CODE FOR TOTAL JOINT CLASS VIDEO     Instructions left on patient's EMAIL.   Cecilia Rodriguez RN.3/9/2023 .11:12 AM

## 2023-03-09 NOTE — PROGRESS NOTES
H&P/ TESTING - PCP [facility]- APPT 3/6 ([phone])  KATIE, AT PCP , WILL FAX H&P AND MOST RECENT EKG AND LABS  /JM    3/10/23 @ messaged surgeon office that pt was placed on antibiotics for positive urine culture   /NR

## 2023-03-09 NOTE — PROGRESS NOTES
Place patient label inside box (if no patient label, complete below)  Name:  :  MR#:   Broadus Felty / PROCEDURE  I (we), Ashley Gallegos (Patient Name) authorize  Belinda Rai MD   (Provider / Louanne Kayser) and/or such assistants as may be selected by him/her, to perform the following operation/procedure(s): RIGHT REVERSE TOTAL SHOULDER REPLACEMENT        Note: If unable to obtain consent prior to an emergent procedure, document the emergent reason in the medical record. This procedure has been explained to my (our) satisfaction and included in the explanation was: The intended benefit, nature, and extent of the procedure to be performed; The significant risks involved and the probability of success; Alternative procedures and methods of treatment; The dangers and probable consequences of such alternatives (including no procedure or treatment); The expected consequences of the procedure on my future health; Whether other qualified individuals would be performing important surgical tasks and/or whether  would be present to advise or support the procedure. I (we) understand that there are other risks of infection and other serious complications in the pre-operative/procedural and postoperative/procedural stages of my (our) care. I (we) have asked all of the questions which I (we) thought were important in deciding whether or not to undergo treatment or diagnosis. These questions have been answered to my (our) satisfaction. I (we) understand that no assurance can be given that the procedure will be a success, and no guarantee or warranty of success has been given to me (us). It has been explained to me (us) that during the course of the operation/procedure, unforeseen conditions may be revealed that necessitate extension of the original procedure(s) or different procedure(s) than those set forth in Paragraph 1.  I (we) authorize and request that the above-named physician, his/her assistants or his/her designees, perform procedures as necessary and desirable if deemed to be in my (our) best interest.     Revised 8/2/2021                                                                          Page 1 of 2       I acknowledge that health care personnel may be observing this procedure for the purpose of medical education or other specified purposes as may be necessary as requested and/or approved by my (our) physician. I (we) consent to the disposal by the hospital Pathologist of the removed tissue, parts or organs in accordance with hospital policy. I do ____ do not ____ consent to the use of a local infiltration pain blocking agent that will be used by my provider/surgical provider to help alleviate pain during my procedure. I do ____ do not ____ consent to an emergent blood transfusion in the case of a life-threatening situation that requires blood components to be administered. This consent is valid for 24 hours from the beginning of the procedure. This patient does ____ or does not ____ currently have a DNR status/order. If DNR order is in place, obtain Addendum to the Surgical Consent for ALL Patients with a DNR Order to address wendi-operative status for limited intervention or DNR suspension.      I have read and fully understand the above Consent for Operation/Procedure and that all blanks were completed before I signed the consent.   _____________________________       _____________________      ____/____am/pm  Signature of Patient or legal representative      Printed Name / Relationship            Date / Time   ____________________________       _____________________      ____/____am/pm  Witness to Signature                                    Printed Name                    Date / Time    If patient is unable to sign or is a minor, complete the following)  Patient is a minor, ____ years of age, or unable to sign because: ______________________________________________________________________________________________    If a phone consent is obtained, consent will be documented by using two health care professionals, each affirming that the consenting party has no questions and gives consent for the procedure discussed with the physician/provider.   _____________________          ____________________       _____/_____am/pm   2nd witness to phone consent        Printed name           Date / Time    Informed Consent:  I have provided the explanation described above in section 1 to the patient and/or legal representative.  I have provided the patient and/or legal representative with an opportunity to ask any questions about the proposed operation/procedure.   ___________________________          ____________________         ____/____am/pm  Provider / Proceduralist                            Printed name            Date / Time  Revised 8/2/2021                                                                      Page 2 of 2

## 2023-03-10 ENCOUNTER — ANESTHESIA EVENT (OUTPATIENT)
Dept: OPERATING ROOM | Age: 68
End: 2023-03-10
Payer: MEDICARE

## 2023-03-13 ENCOUNTER — HOSPITAL ENCOUNTER (OUTPATIENT)
Age: 68
Setting detail: OUTPATIENT SURGERY
Discharge: HOME | End: 2023-03-13
Attending: ORTHOPAEDIC SURGERY | Admitting: ORTHOPAEDIC SURGERY
Payer: MEDICARE

## 2023-03-13 ENCOUNTER — APPOINTMENT (OUTPATIENT)
Dept: GENERAL RADIOLOGY | Age: 68
End: 2023-03-13
Attending: ORTHOPAEDIC SURGERY
Payer: MEDICARE

## 2023-03-13 ENCOUNTER — ANESTHESIA (OUTPATIENT)
Dept: OPERATING ROOM | Age: 68
End: 2023-03-13
Payer: MEDICARE

## 2023-03-13 VITALS
SYSTOLIC BLOOD PRESSURE: 110 MMHG | RESPIRATION RATE: 16 BRPM | HEART RATE: 66 BPM | OXYGEN SATURATION: 97 % | WEIGHT: 103.2 LBS | TEMPERATURE: 97.7 F | DIASTOLIC BLOOD PRESSURE: 73 MMHG | BODY MASS INDEX: 18.29 KG/M2 | HEIGHT: 63 IN

## 2023-03-13 DIAGNOSIS — Z98.890 S/P SHOULDER SURGERY: Primary | ICD-10-CM

## 2023-03-13 LAB
ABO/RH: NORMAL
ANTIBODY SCREEN: NORMAL

## 2023-03-13 PROCEDURE — 97530 THERAPEUTIC ACTIVITIES: CPT

## 2023-03-13 PROCEDURE — 2580000003 HC RX 258: Performed by: ORTHOPAEDIC SURGERY

## 2023-03-13 PROCEDURE — 6360000002 HC RX W HCPCS: Performed by: ORTHOPAEDIC SURGERY

## 2023-03-13 PROCEDURE — 97161 PT EVAL LOW COMPLEX 20 MIN: CPT

## 2023-03-13 PROCEDURE — C1713 ANCHOR/SCREW BN/BN,TIS/BN: HCPCS | Performed by: ORTHOPAEDIC SURGERY

## 2023-03-13 PROCEDURE — 97166 OT EVAL MOD COMPLEX 45 MIN: CPT

## 2023-03-13 PROCEDURE — 3700000001 HC ADD 15 MINUTES (ANESTHESIA): Performed by: ORTHOPAEDIC SURGERY

## 2023-03-13 PROCEDURE — 2580000003 HC RX 258: Performed by: ANESTHESIOLOGY

## 2023-03-13 PROCEDURE — 97116 GAIT TRAINING THERAPY: CPT

## 2023-03-13 PROCEDURE — 7100000010 HC PHASE II RECOVERY - FIRST 15 MIN: Performed by: ORTHOPAEDIC SURGERY

## 2023-03-13 PROCEDURE — 86900 BLOOD TYPING SEROLOGIC ABO: CPT

## 2023-03-13 PROCEDURE — 2500000003 HC RX 250 WO HCPCS: Performed by: ANESTHESIOLOGY

## 2023-03-13 PROCEDURE — 73020 X-RAY EXAM OF SHOULDER: CPT

## 2023-03-13 PROCEDURE — 3700000000 HC ANESTHESIA ATTENDED CARE: Performed by: ORTHOPAEDIC SURGERY

## 2023-03-13 PROCEDURE — 2580000003 HC RX 258: Performed by: NURSE ANESTHETIST, CERTIFIED REGISTERED

## 2023-03-13 PROCEDURE — 64415 NJX AA&/STRD BRCH PLXS IMG: CPT | Performed by: ANESTHESIOLOGY

## 2023-03-13 PROCEDURE — 7100000000 HC PACU RECOVERY - FIRST 15 MIN: Performed by: ORTHOPAEDIC SURGERY

## 2023-03-13 PROCEDURE — 3600000014 HC SURGERY LEVEL 4 ADDTL 15MIN: Performed by: ORTHOPAEDIC SURGERY

## 2023-03-13 PROCEDURE — 7100000001 HC PACU RECOVERY - ADDTL 15 MIN: Performed by: ORTHOPAEDIC SURGERY

## 2023-03-13 PROCEDURE — 86850 RBC ANTIBODY SCREEN: CPT

## 2023-03-13 PROCEDURE — 2500000003 HC RX 250 WO HCPCS: Performed by: NURSE ANESTHETIST, CERTIFIED REGISTERED

## 2023-03-13 PROCEDURE — 2709999900 HC NON-CHARGEABLE SUPPLY: Performed by: ORTHOPAEDIC SURGERY

## 2023-03-13 PROCEDURE — 7100000011 HC PHASE II RECOVERY - ADDTL 15 MIN: Performed by: ORTHOPAEDIC SURGERY

## 2023-03-13 PROCEDURE — 86901 BLOOD TYPING SEROLOGIC RH(D): CPT

## 2023-03-13 PROCEDURE — C1776 JOINT DEVICE (IMPLANTABLE): HCPCS | Performed by: ORTHOPAEDIC SURGERY

## 2023-03-13 PROCEDURE — 6370000000 HC RX 637 (ALT 250 FOR IP): Performed by: ORTHOPAEDIC SURGERY

## 2023-03-13 PROCEDURE — 6360000002 HC RX W HCPCS: Performed by: ANESTHESIOLOGY

## 2023-03-13 PROCEDURE — A4217 STERILE WATER/SALINE, 500 ML: HCPCS | Performed by: ORTHOPAEDIC SURGERY

## 2023-03-13 PROCEDURE — 3600000004 HC SURGERY LEVEL 4 BASE: Performed by: ORTHOPAEDIC SURGERY

## 2023-03-13 PROCEDURE — 6360000002 HC RX W HCPCS: Performed by: NURSE ANESTHETIST, CERTIFIED REGISTERED

## 2023-03-13 PROCEDURE — 97535 SELF CARE MNGMENT TRAINING: CPT

## 2023-03-13 DEVICE — SCREW, LOCKING BONE, RSP, 5X22
Type: IMPLANTABLE DEVICE | Site: SHOULDER | Status: FUNCTIONAL
Brand: DJO SURGICAL

## 2023-03-13 DEVICE — AR, SMALL SOCKET INSERT, 32MM NEUTRAL EPLUS
Type: IMPLANTABLE DEVICE | Site: SHOULDER | Status: FUNCTIONAL
Brand: DJO SURGICAL

## 2023-03-13 DEVICE — IMPL CAPPED SHOULDER S3 TOTAL ADV REVERSE DJO: Type: IMPLANTABLE DEVICE | Site: SHOULDER | Status: FUNCTIONAL

## 2023-03-13 DEVICE — RSP BASEPLATE, 30MM, W/P2 COATING
Type: IMPLANTABLE DEVICE | Site: SHOULDER | Status: FUNCTIONAL
Brand: DJO SURGICAL

## 2023-03-13 DEVICE — SCREW, LOCKING BONE, RSP, 5X30
Type: IMPLANTABLE DEVICE | Site: SHOULDER | Status: FUNCTIONAL
Brand: DJO SURGICAL

## 2023-03-13 DEVICE — IMPLANTABLE DEVICE: Type: IMPLANTABLE DEVICE | Site: SHOULDER | Status: FUNCTIONAL

## 2023-03-13 DEVICE — GLENOID, HEAD W/RETAINING SCREW, RSP, 32MM/-4MM
Type: IMPLANTABLE DEVICE | Site: SHOULDER | Status: FUNCTIONAL
Brand: DJO SURGICAL

## 2023-03-13 DEVICE — SCREW, LOCKING BONE, RSP, 5X18
Type: IMPLANTABLE DEVICE | Site: SHOULDER | Status: FUNCTIONAL
Brand: DJO SURGICAL

## 2023-03-13 RX ORDER — SODIUM CHLORIDE 9 MG/ML
INJECTION, SOLUTION INTRAVENOUS PRN
Status: DISCONTINUED | OUTPATIENT
Start: 2023-03-13 | End: 2023-03-13 | Stop reason: HOSPADM

## 2023-03-13 RX ORDER — ONDANSETRON 2 MG/ML
4 INJECTION INTRAMUSCULAR; INTRAVENOUS
Status: DISCONTINUED | OUTPATIENT
Start: 2023-03-13 | End: 2023-03-13 | Stop reason: HOSPADM

## 2023-03-13 RX ORDER — BUPIVACAINE HYDROCHLORIDE 5 MG/ML
INJECTION, SOLUTION EPIDURAL; INTRACAUDAL PRN
Status: DISCONTINUED | OUTPATIENT
Start: 2023-03-13 | End: 2023-03-13 | Stop reason: SDUPTHER

## 2023-03-13 RX ORDER — SODIUM CHLORIDE 0.9 % (FLUSH) 0.9 %
5-40 SYRINGE (ML) INJECTION PRN
Status: DISCONTINUED | OUTPATIENT
Start: 2023-03-13 | End: 2023-03-13 | Stop reason: HOSPADM

## 2023-03-13 RX ORDER — ONDANSETRON 4 MG/1
4 TABLET, ORALLY DISINTEGRATING ORAL 3 TIMES DAILY PRN
Qty: 21 TABLET | Refills: 1 | Status: SHIPPED | OUTPATIENT
Start: 2023-03-13

## 2023-03-13 RX ORDER — VANCOMYCIN HYDROCHLORIDE 1 G/20ML
INJECTION, POWDER, LYOPHILIZED, FOR SOLUTION INTRAVENOUS PRN
Status: DISCONTINUED | OUTPATIENT
Start: 2023-03-13 | End: 2023-03-13 | Stop reason: ALTCHOICE

## 2023-03-13 RX ORDER — PROPOFOL 10 MG/ML
INJECTION, EMULSION INTRAVENOUS PRN
Status: DISCONTINUED | OUTPATIENT
Start: 2023-03-13 | End: 2023-03-13 | Stop reason: SDUPTHER

## 2023-03-13 RX ORDER — PROCHLORPERAZINE EDISYLATE 5 MG/ML
5 INJECTION INTRAMUSCULAR; INTRAVENOUS
Status: DISCONTINUED | OUTPATIENT
Start: 2023-03-13 | End: 2023-03-13 | Stop reason: HOSPADM

## 2023-03-13 RX ORDER — BUPIVACAINE HYDROCHLORIDE 2.5 MG/ML
INJECTION, SOLUTION EPIDURAL; INFILTRATION; INTRACAUDAL PRN
Status: DISCONTINUED | OUTPATIENT
Start: 2023-03-13 | End: 2023-03-13 | Stop reason: SDUPTHER

## 2023-03-13 RX ORDER — ONDANSETRON 2 MG/ML
INJECTION INTRAMUSCULAR; INTRAVENOUS PRN
Status: DISCONTINUED | OUTPATIENT
Start: 2023-03-13 | End: 2023-03-13 | Stop reason: SDUPTHER

## 2023-03-13 RX ORDER — DEXAMETHASONE SODIUM PHOSPHATE 4 MG/ML
INJECTION, SOLUTION INTRA-ARTICULAR; INTRALESIONAL; INTRAMUSCULAR; INTRAVENOUS; SOFT TISSUE PRN
Status: DISCONTINUED | OUTPATIENT
Start: 2023-03-13 | End: 2023-03-13 | Stop reason: SDUPTHER

## 2023-03-13 RX ORDER — SODIUM CHLORIDE, SODIUM LACTATE, POTASSIUM CHLORIDE, CALCIUM CHLORIDE 600; 310; 30; 20 MG/100ML; MG/100ML; MG/100ML; MG/100ML
INJECTION, SOLUTION INTRAVENOUS CONTINUOUS
Status: DISCONTINUED | OUTPATIENT
Start: 2023-03-13 | End: 2023-03-13 | Stop reason: HOSPADM

## 2023-03-13 RX ORDER — ACETAMINOPHEN 500 MG
1000 TABLET ORAL ONCE
Status: COMPLETED | OUTPATIENT
Start: 2023-03-13 | End: 2023-03-13

## 2023-03-13 RX ORDER — LIDOCAINE HYDROCHLORIDE 20 MG/ML
INJECTION, SOLUTION INFILTRATION; PERINEURAL PRN
Status: DISCONTINUED | OUTPATIENT
Start: 2023-03-13 | End: 2023-03-13 | Stop reason: SDUPTHER

## 2023-03-13 RX ORDER — EPHEDRINE SULFATE 50 MG/ML
INJECTION INTRAVENOUS PRN
Status: DISCONTINUED | OUTPATIENT
Start: 2023-03-13 | End: 2023-03-13 | Stop reason: SDUPTHER

## 2023-03-13 RX ORDER — FENTANYL CITRATE 50 UG/ML
INJECTION, SOLUTION INTRAMUSCULAR; INTRAVENOUS
Status: COMPLETED
Start: 2023-03-13 | End: 2023-03-13

## 2023-03-13 RX ORDER — DEXMEDETOMIDINE HYDROCHLORIDE 100 UG/ML
INJECTION, SOLUTION INTRAVENOUS PRN
Status: DISCONTINUED | OUTPATIENT
Start: 2023-03-13 | End: 2023-03-13 | Stop reason: SDUPTHER

## 2023-03-13 RX ORDER — BUPIVACAINE HYDROCHLORIDE 5 MG/ML
INJECTION, SOLUTION EPIDURAL; INTRACAUDAL
Status: COMPLETED
Start: 2023-03-13 | End: 2023-03-13

## 2023-03-13 RX ORDER — MAGNESIUM HYDROXIDE 1200 MG/15ML
LIQUID ORAL PRN
Status: DISCONTINUED | OUTPATIENT
Start: 2023-03-13 | End: 2023-03-13 | Stop reason: HOSPADM

## 2023-03-13 RX ORDER — HYDROCODONE BITARTRATE AND ACETAMINOPHEN 5; 325 MG/1; MG/1
1 TABLET ORAL EVERY 6 HOURS PRN
Qty: 28 TABLET | Refills: 0 | Status: SHIPPED | OUTPATIENT
Start: 2023-03-13 | End: 2023-03-20

## 2023-03-13 RX ORDER — MIDAZOLAM HYDROCHLORIDE 1 MG/ML
INJECTION INTRAMUSCULAR; INTRAVENOUS PRN
Status: DISCONTINUED | OUTPATIENT
Start: 2023-03-13 | End: 2023-03-13 | Stop reason: SDUPTHER

## 2023-03-13 RX ORDER — FENTANYL CITRATE 50 UG/ML
INJECTION, SOLUTION INTRAMUSCULAR; INTRAVENOUS PRN
Status: DISCONTINUED | OUTPATIENT
Start: 2023-03-13 | End: 2023-03-13 | Stop reason: SDUPTHER

## 2023-03-13 RX ORDER — SODIUM CHLORIDE 0.9 % (FLUSH) 0.9 %
5-40 SYRINGE (ML) INJECTION EVERY 12 HOURS SCHEDULED
Status: DISCONTINUED | OUTPATIENT
Start: 2023-03-13 | End: 2023-03-13 | Stop reason: HOSPADM

## 2023-03-13 RX ORDER — MIDAZOLAM HYDROCHLORIDE 1 MG/ML
INJECTION INTRAMUSCULAR; INTRAVENOUS
Status: COMPLETED
Start: 2023-03-13 | End: 2023-03-13

## 2023-03-13 RX ORDER — GLYCOPYRROLATE 0.2 MG/ML
INJECTION INTRAMUSCULAR; INTRAVENOUS PRN
Status: DISCONTINUED | OUTPATIENT
Start: 2023-03-13 | End: 2023-03-13 | Stop reason: SDUPTHER

## 2023-03-13 RX ORDER — HYDRALAZINE HYDROCHLORIDE 20 MG/ML
10 INJECTION INTRAMUSCULAR; INTRAVENOUS
Status: DISCONTINUED | OUTPATIENT
Start: 2023-03-13 | End: 2023-03-13 | Stop reason: HOSPADM

## 2023-03-13 RX ORDER — ASPIRIN 81 MG/1
81 TABLET ORAL 2 TIMES DAILY
Qty: 30 TABLET | Refills: 0 | Status: SHIPPED | OUTPATIENT
Start: 2023-03-13 | End: 2023-03-27

## 2023-03-13 RX ORDER — LABETALOL HYDROCHLORIDE 5 MG/ML
10 INJECTION, SOLUTION INTRAVENOUS
Status: DISCONTINUED | OUTPATIENT
Start: 2023-03-13 | End: 2023-03-13 | Stop reason: HOSPADM

## 2023-03-13 RX ORDER — SENNA PLUS 8.6 MG/1
1 TABLET ORAL 2 TIMES DAILY
Qty: 60 TABLET | Refills: 0 | Status: SHIPPED | OUTPATIENT
Start: 2023-03-13 | End: 2023-04-12

## 2023-03-13 RX ORDER — PREGABALIN 150 MG/1
150 CAPSULE ORAL ONCE
Status: COMPLETED | OUTPATIENT
Start: 2023-03-13 | End: 2023-03-13

## 2023-03-13 RX ORDER — ROCURONIUM BROMIDE 10 MG/ML
INJECTION, SOLUTION INTRAVENOUS PRN
Status: DISCONTINUED | OUTPATIENT
Start: 2023-03-13 | End: 2023-03-13 | Stop reason: SDUPTHER

## 2023-03-13 RX ORDER — SODIUM CHLORIDE, SODIUM LACTATE, POTASSIUM CHLORIDE, CALCIUM CHLORIDE 600; 310; 30; 20 MG/100ML; MG/100ML; MG/100ML; MG/100ML
INJECTION, SOLUTION INTRAVENOUS CONTINUOUS PRN
Status: DISCONTINUED | OUTPATIENT
Start: 2023-03-13 | End: 2023-03-13 | Stop reason: SDUPTHER

## 2023-03-13 RX ADMIN — VANCOMYCIN HYDROCHLORIDE 750 MG: 10 INJECTION, POWDER, LYOPHILIZED, FOR SOLUTION INTRAVENOUS at 08:26

## 2023-03-13 RX ADMIN — BUPIVACAINE HYDROCHLORIDE 10 ML: 2.5 INJECTION, SOLUTION EPIDURAL; INFILTRATION; INTRACAUDAL; PERINEURAL at 13:15

## 2023-03-13 RX ADMIN — PHENYLEPHRINE HYDROCHLORIDE 100 MCG: 10 INJECTION, SOLUTION INTRAMUSCULAR; INTRAVENOUS; SUBCUTANEOUS at 08:42

## 2023-03-13 RX ADMIN — LIDOCAINE HYDROCHLORIDE 100 MG: 20 INJECTION, SOLUTION INFILTRATION; PERINEURAL at 08:11

## 2023-03-13 RX ADMIN — SODIUM CHLORIDE, SODIUM LACTATE, POTASSIUM CHLORIDE, AND CALCIUM CHLORIDE: .6; .31; .03; .02 INJECTION, SOLUTION INTRAVENOUS at 07:57

## 2023-03-13 RX ADMIN — SUGAMMADEX 200 MG: 100 INJECTION, SOLUTION INTRAVENOUS at 09:45

## 2023-03-13 RX ADMIN — PHENYLEPHRINE HYDROCHLORIDE 100 MCG: 10 INJECTION, SOLUTION INTRAMUSCULAR; INTRAVENOUS; SUBCUTANEOUS at 08:53

## 2023-03-13 RX ADMIN — MIDAZOLAM HYDROCHLORIDE 2 MG: 2 INJECTION, SOLUTION INTRAMUSCULAR; INTRAVENOUS at 07:30

## 2023-03-13 RX ADMIN — PHENYLEPHRINE HYDROCHLORIDE 100 MCG: 10 INJECTION, SOLUTION INTRAMUSCULAR; INTRAVENOUS; SUBCUTANEOUS at 08:52

## 2023-03-13 RX ADMIN — SODIUM CHLORIDE, SODIUM LACTATE, POTASSIUM CHLORIDE, AND CALCIUM CHLORIDE: .6; .31; .03; .02 INJECTION, SOLUTION INTRAVENOUS at 08:29

## 2023-03-13 RX ADMIN — ROCURONIUM BROMIDE 50 MG: 10 INJECTION INTRAVENOUS at 08:11

## 2023-03-13 RX ADMIN — PROPOFOL 110 MG: 10 INJECTION, EMULSION INTRAVENOUS at 08:11

## 2023-03-13 RX ADMIN — ACETAMINOPHEN 1000 MG: 500 TABLET ORAL at 06:47

## 2023-03-13 RX ADMIN — DEXAMETHASONE SODIUM PHOSPHATE 8 MG: 4 INJECTION, SOLUTION INTRAMUSCULAR; INTRAVENOUS at 08:26

## 2023-03-13 RX ADMIN — FENTANYL CITRATE 100 MCG: 50 INJECTION, SOLUTION INTRAMUSCULAR; INTRAVENOUS at 07:30

## 2023-03-13 RX ADMIN — PHENYLEPHRINE HYDROCHLORIDE 100 MCG: 10 INJECTION, SOLUTION INTRAMUSCULAR; INTRAVENOUS; SUBCUTANEOUS at 08:46

## 2023-03-13 RX ADMIN — DEXMEDETOMIDINE HYDROCHLORIDE 2 MCG: 100 INJECTION, SOLUTION INTRAVENOUS at 09:50

## 2023-03-13 RX ADMIN — ONDANSETRON 4 MG: 2 INJECTION INTRAMUSCULAR; INTRAVENOUS at 08:26

## 2023-03-13 RX ADMIN — PREGABALIN 150 MG: 150 CAPSULE ORAL at 06:48

## 2023-03-13 RX ADMIN — CEFTRIAXONE 2000 MG: 2 INJECTION, POWDER, FOR SOLUTION INTRAMUSCULAR; INTRAVENOUS at 08:24

## 2023-03-13 RX ADMIN — DEXMEDETOMIDINE HYDROCHLORIDE 2 MCG: 100 INJECTION, SOLUTION INTRAVENOUS at 09:45

## 2023-03-13 RX ADMIN — PHENYLEPHRINE HYDROCHLORIDE 100 MCG: 10 INJECTION, SOLUTION INTRAMUSCULAR; INTRAVENOUS; SUBCUTANEOUS at 08:23

## 2023-03-13 RX ADMIN — PHENYLEPHRINE HYDROCHLORIDE 100 MCG: 10 INJECTION, SOLUTION INTRAMUSCULAR; INTRAVENOUS; SUBCUTANEOUS at 08:58

## 2023-03-13 RX ADMIN — DEXMEDETOMIDINE HYDROCHLORIDE 2 MCG: 100 INJECTION, SOLUTION INTRAVENOUS at 09:43

## 2023-03-13 RX ADMIN — HYDROMORPHONE HYDROCHLORIDE 0.25 MG: 1 INJECTION, SOLUTION INTRAMUSCULAR; INTRAVENOUS; SUBCUTANEOUS at 12:40

## 2023-03-13 RX ADMIN — EPHEDRINE SULFATE 10 MG: 50 INJECTION INTRAVENOUS at 09:13

## 2023-03-13 RX ADMIN — PHENYLEPHRINE HYDROCHLORIDE 100 MCG: 10 INJECTION, SOLUTION INTRAMUSCULAR; INTRAVENOUS; SUBCUTANEOUS at 08:25

## 2023-03-13 RX ADMIN — SODIUM CHLORIDE, POTASSIUM CHLORIDE, SODIUM LACTATE AND CALCIUM CHLORIDE: 600; 310; 30; 20 INJECTION, SOLUTION INTRAVENOUS at 06:47

## 2023-03-13 RX ADMIN — GLYCOPYRROLATE 0.2 MG: 0.2 INJECTION INTRAMUSCULAR; INTRAVENOUS at 08:21

## 2023-03-13 RX ADMIN — BUPIVACAINE HYDROCHLORIDE 30 ML: 5 INJECTION, SOLUTION EPIDURAL; INTRACAUDAL; PERINEURAL at 07:30

## 2023-03-13 RX ADMIN — PHENYLEPHRINE HYDROCHLORIDE 100 MCG: 10 INJECTION, SOLUTION INTRAMUSCULAR; INTRAVENOUS; SUBCUTANEOUS at 09:14

## 2023-03-13 RX ADMIN — PHENYLEPHRINE HYDROCHLORIDE 100 MCG: 10 INJECTION, SOLUTION INTRAMUSCULAR; INTRAVENOUS; SUBCUTANEOUS at 10:13

## 2023-03-13 RX ADMIN — PHENYLEPHRINE HYDROCHLORIDE 100 MCG: 10 INJECTION, SOLUTION INTRAMUSCULAR; INTRAVENOUS; SUBCUTANEOUS at 10:11

## 2023-03-13 RX ADMIN — ROCURONIUM BROMIDE 10 MG: 10 INJECTION INTRAVENOUS at 09:21

## 2023-03-13 ASSESSMENT — PAIN DESCRIPTION - ONSET
ONSET: AWAKENED FROM SLEEP
ONSET: ON-GOING
ONSET: ON-GOING

## 2023-03-13 ASSESSMENT — PAIN DESCRIPTION - DESCRIPTORS
DESCRIPTORS: DULL

## 2023-03-13 ASSESSMENT — PAIN DESCRIPTION - LOCATION
LOCATION: SHOULDER

## 2023-03-13 ASSESSMENT — PAIN SCALES - GENERAL
PAINLEVEL_OUTOF10: 3
PAINLEVEL_OUTOF10: 5
PAINLEVEL_OUTOF10: 6
PAINLEVEL_OUTOF10: 0

## 2023-03-13 ASSESSMENT — PAIN - FUNCTIONAL ASSESSMENT
PAIN_FUNCTIONAL_ASSESSMENT: PREVENTS OR INTERFERES SOME ACTIVE ACTIVITIES AND ADLS

## 2023-03-13 ASSESSMENT — PAIN DESCRIPTION - ORIENTATION
ORIENTATION: RIGHT

## 2023-03-13 ASSESSMENT — PAIN DESCRIPTION - FREQUENCY
FREQUENCY: CONTINUOUS

## 2023-03-13 NOTE — BRIEF OP NOTE
Brief Postoperative Note      Patient: Leonila Rodriguez  YOB: 1955  MRN: [id number]    Date of Procedure: 3/13/2023    Pre-Op Diagnosis: Failure of previous rotator cuff repair [M96.89]    Post-Op Diagnosis: Same       Procedure(s):  RIGHT SHOULDER ARTHROTOMY, LYSIS OF ADHESIONS, REMOVAL OF SUTURE ANCHOR, REVERSE TOTAL SHOULDER REPLACEMENT    Surgeon(s):  Tara George MD    Assistant:  Surgical Assistant: Cheryl Hernández  Fellow: Mar Nieto MD    Anesthesia: General    Estimated Blood Loss (mL): 965     Complications: None    Specimens:   * No specimens in log *    Implants:  * No implants in log *      Drains: * No LDAs found *    Findings: See dictated operative report.      Electronically signed by Mar Nieto MD on 3/13/2023 at 10:00 AM

## 2023-03-13 NOTE — PROGRESS NOTES
Physical Therapy  Facility/Department: [facility] GENERAL SURGERY  Physical Therapy Initial Assessment, Treatment and Discharge    Name: Tal Phillips  : 1955  MRN: [id number]  Date of Service: 3/13/2023    Discharge Recommendations:    Tal Phillips scored a 18/24 on the AM-PAC short mobility form. Current research shows that an AM-PAC score of 18 or greater is typically associated with a discharge to the patient's home setting. Based on the patient's AM-PAC score and their current functional mobility deficits, it is recommended that the patient have 2-3 sessions per week of Physical Therapy at d/c to increase the patient's independence. At this time, this patient demonstrates the endurance and safety to discharge home with  (home vs OP services) and a follow up treatment frequency of 2-3x/wk. Please see assessment section for further patient specific details. If patient discharges prior to next session this note will serve as a discharge summary. Please see below for the latest assessment towards goals. PT Equipment Recommendations  Equipment Needed: No      Patient Diagnosis(es): The encounter diagnosis was S/P shoulder surgery. Past Medical History:  has a past medical history of Anxiety, Cancer ([facility] [address] Osteoarthritis, and Osteoporosis. Past Surgical History:  has a past surgical history that includes Rotator cuff repair (Bilateral); colectomy (); Colonoscopy; Colonoscopy (2015); shoulder surgery; and Shoulder arthroscopy (Right, 2022). Assessment   Assessment: Pt post right reverse TSR. Normally lives alone independently. Currently plan is to return home with 24/7 assist of sister x 1 week and then prn with family and friends. OPPT when ordered.   No further PT needs identified  Treatment Diagnosis: Decreased functional mobility post right reverse TSR  Barriers to Learning: none noted  Activity Tolerance  Activity Tolerance: Treatment limited secondary to medical complications; Patient tolerated evaluation without incident;Patient tolerated treatment well  Activity Tolerance Comments: Initial PT attempt BP 64/50 with sitting - lay down BP increased to 97/56. After resting 40 min resting BP was 115/74 supine. Seated 106/64 and standing 101/65. No issues with dizziness with gait.      Plan   Physcial Therapy Plan  General Plan: Discharge  Safety Devices  Type of Devices: Call light within reach, Nurse notified, Left in bed     Restrictions  Position Activity Restriction  Other position/activity restrictions: nwb RUE - up with A     Subjective   General  Additional Pertinent Hx: pt to OR 3/13 for RIGHT SHOULDER ARTHROTOMY, LYSIS OF ADHESIONS, REMOVAL OF SUTURE ANCHOR, REVERSE TOTAL SHOULDER REPLACEMENT (Right: Shoulder)  Referring Practitioner: Michael Astorga  Diagnosis: Right reverse TSR  Subjective  Subjective: Pt in bed upon PT entry, agreeable to working with PT         Social/Functional History  Social/Functional History  Lives With: Alone (sister staying with pt)  Type of Home: [facility]  Home Layout: One level  Home Access: Level entry (threshold step)  Bathroom Shower/Tub: Walk-in shower  Bathroom Toilet: Handicap height  Home Equipment:  (no DME)  Has the patient had two or more falls in the past year or any fall with injury in the past year?: No  Receives Help From: Family  ADL Assistance: Independent  Homemaking Assistance: Independent  Ambulation Assistance: Independent  Transfer Assistance: Independent  Active : Yes  Occupation: Retired  Type of Occupation:   Leisure & Hobbies: spinning, walking, reading  [address]   Orientation  Overall Orientation Status: Within Normal Limits  Cognition  Overall Cognitive Status: WNL     Objective   Heart Rate: 66  Heart Rate Source: Monitor  BP: 110/73  BP Location: Left upper arm  BP Method: Automatic  Patient Position: Semi fowlers  MAP (Calculated): 85  Resp: 16  SpO2: 97 %  O2 Device: None (Room air)     Observation/Palpation  Observation: right shoulder immobilizer        AROM RLE (degrees)  RLE AROM: WFL  AROM LLE (degrees)  LLE AROM : WFL  Strength RLE  Strength RLE: WFL  Strength LLE  Strength LLE: WFL           Bed mobility  Supine to Sit: Stand by assistance  Sit to Supine: Stand by assistance  Transfers  Sit to Stand: Contact guard assistance  Stand to Sit: Contact guard assistance  Ambulation  Surface: Level tile  Device: No Device  Assistance: Contact guard assistance  Quality of Gait: slow and steady gait, decreased step length  Distance: 30 ft x 2     Balance  Comments: good stability with HHA      AM-PAC Score  AM-PAC Inpatient Mobility Raw Score : 18 (03/13/23 1544)  AM-PAC Inpatient T-Scale Score : 43.63 (03/13/23 1544)  Mobility Inpatient CMS 0-100% Score: 46.58 (03/13/23 1544)  Mobility Inpatient CMS G-Code Modifier : CK (03/13/23 1544)      Education role of PT, safety, bed mobility, transfers, gait, donning and doffing shoulder immobilizer         Therapy Time   Individual Concurrent Group Co-treatment   Time In 0122         Time Out 0145         Minutes 23            Second Session Therapy Time:   Individual Concurrent Group Co-treatment   Time In 0245         Time Out 0300         Minutes 15           Timed Code Treatment Minutes:  25    Total Treatment Minutes:  Simon Shukla [address], [id number]

## 2023-03-13 NOTE — ANESTHESIA PROCEDURE NOTES
Peripheral Block    Patient location during procedure: pre-op  Reason for block: procedure for pain, post-op pain management and at surgeon's request  Start time: 3/13/2023 7:46 AM  Staffing  Performed: anesthesiologist   Anesthesiologist: Ramya Soto DO  Preanesthetic Checklist  Completed: patient identified, IV checked, site marked, risks and benefits discussed, surgical/procedural consents, equipment checked, pre-op evaluation, timeout performed, anesthesia consent given, oxygen available, monitors applied/VS acknowledged, fire risk safety assessment completed and verbalized and blood product R/B/A discussed and consented  Peripheral Block   Patient position: supine  Prep: ChloraPrep  Patient monitoring: cardiac monitor, continuous pulse ox, continuous capnometry, frequent blood pressure checks, IV access, responsive to questions and oxygen  Block type: Brachial plexus  Interscalene  Laterality: right  Injection technique: single-shot  Guidance: ultrasound guided    Needle   Needle gauge: 22 G  Needle localization: anatomical landmarks and ultrasound guidance  Assessment   Injection assessment: negative aspiration for heme, no paresthesia on injection, local visualized surrounding nerve on ultrasound and no intravascular symptoms  Paresthesia pain: none  Slow fractionated injection: yes  Hemodynamics: stable  Real-time US image taken/store: yes  Outcomes: uncomplicated and patient tolerated procedure well    Additional Notes  Sterile prep. Timeout with SDS RN. 2 mg versed + 100 mcg fentanyl IV for pt comfort. 30 mL 0.5% Bupivacaine injected in 5 mL increments following negative aspiration. Tip of needle in view at all times. No pain or paresthesias on injection. Pt tolerated the procedure well.

## 2023-03-13 NOTE — PROGRESS NOTES
PACU Transfer to [facility]    Procedure(s):  RIGHT SHOULDER ARTHROTOMY, LYSIS OF ADHESIONS, REMOVAL OF SUTURE ANCHOR, REVERSE TOTAL SHOULDER REPLACEMENT    Pt's Current Allergies: Percocet [oxycodone-acetaminophen] and Sulfa antibiotics    Pt meets criteria to transfer to next phase of care per Rudell Sridhar and [facility] standards    No results for input(s): POCGLU in the last 72 hours. Vitals:    03/13/23 1515   BP: 113/73   Pulse: 76   Resp: 16   Temp: 97.7 °F (36.5 °C)   SpO2: 96%          Intake/Output Summary (Last 24 hours) at 3/13/2023 1521  Last data filed at 3/13/2023 1430  Gross per 24 hour   Intake 3380 ml   Output --   Net 3380 ml     Pt urinated on toilet without difficulty. Pain assessment:  none    Patient was assessed for unknown alterations to skin integrity. There were no unknown alterations observed. Patient transferred to care of Tru Lynne RN.    Family updated and directed to Tru Lynne    3/13/2023 3:21 PM

## 2023-03-13 NOTE — PROGRESS NOTES
Occupational Therapy  Facility/Department: [address] [place] [address]  Occupational Therapy Initial Assessment/Tx Note    Name: Emily Galvez  : 1955  MRN: [id number]  Date of Service: 3/13/2023  Assessment: With exception of orthostatic hypotension, pt tolerated therapy well POD#0. She was mildly unsteady at times due to low BP, but able to tolerate household distance mobility and perform ADLs with assist. At d/c, pt would benefit from initial 24 hr A. Discharge Recommendations: Emily Galvez scored a 19/24 on the AM-PAC ADL Inpatient form. At this time, no further OT is recommended upon discharge. Recommend patient returns to prior setting with 24 hr A.         OT Equipment Recommendations  Equipment Needed: No       Patient Diagnosis(es): The encounter diagnosis was S/P shoulder surgery. Past Medical History:  has a past medical history of Anxiety, Cancer ([facility] [address] Osteoarthritis, and Osteoporosis. Past Surgical History:  has a past surgical history that includes Rotator cuff repair (Bilateral); colectomy (); Colonoscopy; Colonoscopy (2015); shoulder surgery; and Shoulder arthroscopy (Right, 2022). Assessment   Decision Making: Medium Complexity  REQUIRES OT FOLLOW-UP: No  Activity Tolerance  Activity Tolerance: Patient Tolerated treatment well;Treatment limited secondary to medical complications (free text)  Activity Tolerance Comments: During first attempt/session, pt hypotensive in supine and orthostatic with BP ranging from 90s/70s to 60s/50s. Returned for second sesssion with improvement and BP maintaining 90s-100s/70s. Pt did have mild lightheadedness at end of session and returnted to supine. Pt is safe functionally to return home today, but would benefit from remaining in hospital a bit longer to allow BP to improve. RN aware of all.         Plan  D/C        Restrictions  Position Activity Restriction  Other position/activity restrictions: nwb RUE - up with A    Subjective   General  Chart Reviewed: Yes  Additional Pertinent Hx: 69 y.o. F s/p R reverse TSA 3/13. PMHx includes L TSA. Family / Caregiver Present: No  Referring Practitioner: Jessica Bartlett  Subjective  Subjective: Pt in bed on arrival. Pleasant and cooperative. No pain s/p block. Social/Functional History  Social/Functional History  Lives With: Alone (sister staying with pt)  Type of Home: Condo  Home Layout: One level  Home Access: Level entry (threshold step)  Bathroom Shower/Tub: Walk-in shower  Bathroom Toilet: Handicap height  Home Equipment:  (no DME)  Has the patient had two or more falls in the past year or any fall with injury in the past year?: No  Receives Help From: Family  ADL Assistance: Independent  Homemaking Assistance: Independent  Ambulation Assistance: Independent  Transfer Assistance: Independent  Active : Yes  Occupation: Retired  Type of Occupation:   Leisure & Hobbies: spinning, walking, reading       Objective          Observation/Palpation  Observation: right shoulder immobilizer  Safety Devices  Type of Devices: Call light within reach;Nurse notified; Left in bed  Bed Mobility Training  Bed Mobility Training: Yes  Supine to Sit: Stand-by assistance  Sit to Supine: Stand-by assistance  Balance  Sitting: Intact  Standing: With support (CGA)  Transfer Training  Transfer Training: Yes  Overall Level of Assistance: Contact-guard assistance  Sit to Stand: Contact-guard assistance  Stand to Sit: Contact-guard assistance  Toilet Transfer: Contact-guard assistance  Gait  Overall Level of Assistance: Contact-guard assistance (typical post-op gait/balance, anticipate improvement)        ADL  Feeding: Independent; Beverage management  Grooming: Modified independent ;Setup  UE Bathing Skilled Clinical Factors: Educated on technique - verb understanding  UE Dressing: Maximum assistance  UE Dressing Skilled Clinical Factors: to don tight tank top, pt verb understanding of method, has looser shirts at home as needed  LE Dressing: Minimal assistance  LE Dressing Skilled Clinical Factors: assist provided secondary to hypotension, pt able to perform as well  Toileting: Contact guard assistance  Additional Comments: Pt educated on SI purpose, wear schedule, donning/doffing, fit/positioning, skin checks - verb understanding, familiar from last surgery. Currently needs max assist to don/doff but familiar with process and will have assist at home. Activity Tolerance  Activity Tolerance: Treatment limited secondary to medical complications; Patient tolerated evaluation without incident;Patient tolerated treatment well  Activity Tolerance Comments: Initial PT attempt BP 64/50 with sitting - lay down BP increased to 97/56. After resting 40 min resting BP was 115/74 supine. Seated 106/64 and standing 101/65. No issues with dizziness with gait. Vision  Vision: Within Functional Limits (wears one contact)  Hearing  Hearing: Within functional limits  Cognition  Overall Cognitive Status: WNL  Orientation  Overall Orientation Status: Within Normal Limits               Exercise Treatment: Educated on acceptable post-op exercises - verb understanding  Education Given To: Patient  Education Provided: Role of Therapy;Home Exercise Program;Precautions; ADL Adaptive Strategies;Transfer Training;Energy Conservation;IADL Safety; Fall Prevention Strategies  Education Method: Verbal  Barriers to Learning: None  Education Outcome: Verbalized understanding                    AM-PAC Score        AM-PAC Inpatient Daily Activity Raw Score: 19 (03/13/23 1621)  AM-PAC Inpatient ADL T-Scale Score : 40.22 (03/13/23 1621)  ADL Inpatient CMS 0-100% Score: 42.8 (03/13/23 1621)  ADL Inpatient CMS G-Code Modifier : CK (03/13/23 1621)        Therapy Time   Individual Individual Group Co-treatment   Time In 1322  1455       Time Out 1345  1508       Minutes 23  13        Timed Code Tx Min: 8 + 13  Total Tx Time: 36 Surya Clio, OT

## 2023-03-13 NOTE — PROGRESS NOTES
Ambulatory Surgery/Procedure Discharge Note    Vitals:    03/13/23 1526   BP: 110/73   Pulse: 66   Resp: 16   Temp: 97.7 °F (36.5 °C)   SpO2: 97%       In: 3380 [P.O.:480; I.V.:2650]  Out: -     Restroom use offered before discharge. Yes    Pain assessment:  level of pain (1-10, 10 severe),   Pain Level: 3  Patient discharged to home with sister. Patient discharged to home/self care.  Patient discharged via wheel chair by transporter to waiting family/S.O.       3/13/2023 3:58 PM

## 2023-03-13 NOTE — PROGRESS NOTES
Patient admitted to PACU # 9 from OR at 1026 post RIGHT SHOULDER ARTHROTOMY, LYSIS OF ADHESIONS, REMOVAL OF SUTURE ANCHOR, REVERSE TOTAL SHOULDER REPLACEMENT - Right per Rocío Maldonado MD.  Attached to PACU monitoring system and report received from anesthesia provider. Patient was reported to be hemodynamically stable during procedure. Patient drowsy on admission and denied pain.

## 2023-03-13 NOTE — PROGRESS NOTES
Pt a/o x 4, VSS on room air. Pt has received prn IV pain medication. Dr Ni Luis at bedside to give postop nerve block to R shoulder. Sister called with update on POC. A time was given for pt to talk with sister via phone call.

## 2023-03-13 NOTE — INTERVAL H&P NOTE
Update History & Physical    The patient's History and Physical of March 9, 2023 was reviewed with the patient and I examined the patient. There was no change. The surgical site was confirmed by the patient and me. Plan: The risks, benefits, expected outcome, and alternative to the recommended procedure have been discussed with the patient. Patient understands and wants to proceed with the procedure.      Electronically signed by Jeniffer Munoz MD on 3/13/2023 at 7:24 AM

## 2023-03-13 NOTE — PROGRESS NOTES
Anesthesia here to do block. O2 placed. Start YZXC:4249  Stop time:0740  Tolerated well    See flow sheet for vital signs.

## 2023-03-13 NOTE — ANESTHESIA POSTPROCEDURE EVALUATION
Department of Anesthesiology  Postprocedure Note    Patient: Kalyn Acosta  MRN: [id number]  YOB: 1955  Date of evaluation: 3/13/2023      Procedure Summary     Date: 03/13/23 Room / Location: [address] [place] [address] [address] [place] [address] / [facility]    Anesthesia Start: 3755 Anesthesia Stop: 1027    Procedure: RIGHT SHOULDER ARTHROTOMY, LYSIS OF ADHESIONS, REMOVAL OF SUTURE ANCHOR, REVERSE TOTAL SHOULDER REPLACEMENT (Right: Shoulder) Diagnosis:       Failure of previous rotator cuff repair      (Failure of previous rotator cuff repair [M96.89])    Surgeons: Karoline Manzano MD Responsible Provider: John Eddy DO    Anesthesia Type: general, regional ASA Status: 2          Anesthesia Type: No value filed.     Ro Phase I: Ro Score: 8    Ro Phase II:        Anesthesia Post Evaluation    Patient location during evaluation: PACU  Patient participation: complete - patient participated  Level of consciousness: awake and alert  Airway patency: patent  Nausea & Vomiting: no nausea and no vomiting  Cardiovascular status: blood pressure returned to baseline  Respiratory status: acceptable  Hydration status: euvolemic  Comments: Pt with pain over medial portion of shoulder near the axilla; profound motor block present over entire UE; additional 10 mL 0.25% Bupivacaine administered via IS nerve block (22G needle) with complete resolution of pain   Multimodal analgesia pain management approach

## 2023-03-13 NOTE — PROGRESS NOTES
Pt a&o x4. VSS. Pt c/o 5/10 right shoulder pain. Pt walks w/ steady gait. 20g IV to L FA. Collected T&S x2. Per pt, pt has active DNR, DNR suspended. Pt has 3 earrings that are unable to be removed. Pt resting in bed w/ call light.

## 2023-03-13 NOTE — ANESTHESIA PRE PROCEDURE
Department of Anesthesiology  Preprocedure Note       Name:  Bisi Hernández   Age:  69 y.o.  :  1955                                          MRN:  [id number]         Date:  3/13/2023      Surgeon: Gopal Chavez):  Vivian Brenner MD    Procedure: Procedure(s):  RIGHT REVERSE TOTAL SHOULDER REPLACEMENT    Medications prior to admission:   Prior to Admission medications    Medication Sig Start Date End Date Taking? Authorizing Provider   aspirin 81 MG EC tablet Take 1 tablet by mouth in the morning and at bedtime for 14 days 3/13/23 3/27/23  Lila Mukherjee MD   HYDROcodone-acetaminophen (NORCO) 5-325 MG per tablet Take 1 tablet by mouth every 6 hours as needed for Pain for up to 7 days. Intended supply: 7 days.  Take lowest dose possible to manage pain Max Daily Amount: 4 tablets 3/13/23 3/20/23  Lila Mukherjee MD   senna (SENOKOT) 8.6 MG tablet Take 1 tablet by mouth 2 times daily 3/13/23 4/12/23  Lila Mukherjee MD   ondansetron (ZOFRAN-ODT) 4 MG disintegrating tablet Take 1 tablet by mouth 3 times daily as needed for Nausea or Vomiting 3/13/23   Lila Mukherjee MD   calcium carbonate (OSCAL) 500 MG TABS tablet Take 1,500 mg by mouth daily    Historical Provider, MD   CHOLECALCIFEROL PO Take 1 capsule by mouth every other day Pt unsure of dosage of capsule    Historical Provider, MD   b complex vitamins capsule Take 1 capsule by mouth daily    Historical Provider, MD   LYSINE PO Take 2 tablets by mouth daily    Historical Provider, MD   vitamin C (ASCORBIC ACID) 500 MG tablet Take 1,000 mg by mouth daily    Historical Provider, MD   Denosumab (PROLIA SC) Inject into the skin every 6 months    Historical Provider, MD   escitalopram (LEXAPRO) 20 MG tablet Take 20 mg by mouth daily  1/3/22   Historical Provider, MD   Multiple Vitamins-Minerals (THERAPEUTIC MULTIVITAMIN-MINERALS) tablet Take 1 tablet by mouth daily    Historical Provider, MD       Current medications:    No current facility-administered medications for this visit. Current Outpatient Medications   Medication Sig Dispense Refill    aspirin 81 MG EC tablet Take 1 tablet by mouth in the morning and at bedtime for 14 days 30 tablet 0    HYDROcodone-acetaminophen (NORCO) 5-325 MG per tablet Take 1 tablet by mouth every 6 hours as needed for Pain for up to 7 days. Intended supply: 7 days. Take lowest dose possible to manage pain Max Daily Amount: 4 tablets 28 tablet 0    senna (SENOKOT) 8.6 MG tablet Take 1 tablet by mouth 2 times daily 60 tablet 0    ondansetron (ZOFRAN-ODT) 4 MG disintegrating tablet Take 1 tablet by mouth 3 times daily as needed for Nausea or Vomiting 21 tablet 1     Facility-Administered Medications Ordered in Other Visits   Medication Dose Route Frequency Provider Last Rate Last Admin    vancomycin (VANCOCIN) 750 mg in sodium chloride 0.9 % 250 mL IVPB  15 mg/kg IntraVENous Once Chip Vela MD        cefTRIAXone (ROCEPHIN) 2,000 mg in sodium chloride 0.9 % 50 mL IVPB (mini-bag)  2,000 mg IntraVENous Once Chip Vela MD        lactated ringers IV soln infusion   IntraVENous Continuous [place] Koyanagi,  mL/hr at 03/13/23 0647 New Bag at 03/13/23 0647    midazolam (VERSED) 2 MG/2ML injection             fentaNYL (SUBLIMAZE) 100 MCG/2ML injection             bupivacaine (PF) (MARCAINE) 0.5 % injection                Allergies:     Allergies   Allergen Reactions    Percocet [Oxycodone-Acetaminophen] Nausea And Vomiting     Severe n/v; pt is able to tolerate Vicodin/hydrocodone    Sulfa Antibiotics Rash     High temp and rash -- was in her 20's when reaction occurred       Problem List:    Patient Active Problem List   Diagnosis Code    Anorectal polyp K62.0, K62.1    Complete rectal prolapse with displacement of anal sphincter K62.3    Constipation K59.00    Fecal incontinence due to anorectal disorder K62.9, R15.9    Fibroid, uterine D25.9       Past Medical History:        Diagnosis Date    Anxiety     Cancer ([facility] [address] COLON    Osteoarthritis     Osteoporosis        Past Surgical History:        Procedure Laterality Date    COLECTOMY  2009    COLONOSCOPY      POLYPS AND COLON CANCER    COLONOSCOPY  2015    ROTATOR CUFF REPAIR Bilateral     SHOULDER ARTHROSCOPY Right 2022    EVALUATION UNDER ANESTHESIA, RIGHT SHOULDER DIAGNOSTIC ARTHROSCOPY, EXTENSIVE DEBRIDEMENT, LYSIS OF ADHESIONS, REVISION OF RIGHT ROTATOR CUFF REPAIR WITH PATCH AUGMENTATION. REMOVAL OF RETAINED SUTURE. performed by Lydia Romero MD at [address] [facility]         Social History:    Social History     Tobacco Use    Smoking status: Former     Packs/day: 1.50     Years: 15.00     Pack years: 22.50     Types: Cigarettes     Quit date: 2001     Years since quittin.2    Smokeless tobacco: Never   Substance Use Topics    Alcohol use: No                                Counseling given: Not Answered      Vital Signs (Current): There were no vitals filed for this visit. BP Readings from Last 3 Encounters:   23 115/76   22 117/69   07/20/15 102/61       NPO Status:                                                                                 BMI:   Wt Readings from Last 3 Encounters:   23 103 lb 3.2 oz (46.8 kg)   23 110 lb (49.9 kg)   10/26/22 110 lb (49.9 kg)     There is no height or weight on file to calculate BMI.    CBC: No results found for: WBC, RBC, HGB, HCT, MCV, RDW, PLT    CMP: No results found for: NA, K, CL, CO2, BUN, CREATININE, GFRAA, AGRATIO, LABGLOM, GLUCOSE, GLU, PROT, CALCIUM, BILITOT, ALKPHOS, AST, ALT    POC Tests: No results for input(s): POCGLU, POCNA, POCK, POCCL, POCBUN, POCHEMO, POCHCT in the last 72 hours.     Coags: No results found for: PROTIME, INR, APTT    HCG (If Applicable): No results found for: PREGTESTUR, PREGSERUM, HCG, HCGQUANT     ABGs: No results found for: PHART, PO2ART, KEI0AGK, [id number], BEART, [id number]     Type & Screen (If Applicable):  No results found for: LABABO, LABRH    Drug/Infectious Status (If Applicable):  No results found for: HIV, HEPCAB    COVID-19 Screening (If Applicable): No results found for: COVID19        Anesthesia Evaluation  Patient summary reviewed and Nursing notes reviewed no history of anesthetic complications:   Airway: Mallampati: I  TM distance: >3 FB   Neck ROM: full  Mouth opening: > = 3 FB   Dental: normal exam         Pulmonary:Negative Pulmonary ROS and normal exam                               Cardiovascular:Negative CV ROS                      Neuro/Psych:   (+) neuromuscular disease:,             GI/Hepatic/Renal: Neg GI/Hepatic/Renal ROS            Endo/Other: Negative Endo/Other ROS                    Abdominal:             Vascular: negative vascular ROS. Other Findings:             Anesthesia Plan      general and regional     ASA 2     (Interscalene nerve block PSR  Pt had numbness in her hand for over one week which was very bothersome to her; requests block without exparel for this surgery. She is aware prolonged numbness may still occur with plain bupivacaine but risk is less. )  Induction: intravenous. MIPS: Postoperative opioids intended and Prophylactic antiemetics administered. Anesthetic plan and risks discussed with patient. Plan discussed with CRNA.                     Curly Talbert DO   3/13/2023

## 2023-03-13 NOTE — PROGRESS NOTES
PT/OT at bedside to assess patient. Pt became hypotensive. Pt placed back in bed, now drinking soft drink and eating jcaki crackers. PT/OT will come back to reassess. Pt now with /67.

## 2023-03-14 ENCOUNTER — TELEPHONE (OUTPATIENT)
Dept: ORTHOPEDIC SURGERY | Age: 68
End: 2023-03-14

## 2023-03-14 NOTE — TELEPHONE ENCOUNTER
Other PATIENT IS CALLING TO REPORT LOW GRADE FEER AT 99.6 AND IS HAVING TROUBLE CONTROLLING PAIN. SHE IS ASKING IF SHE CAN TAKE SOMETHING ALONG WITH NORCO.  PATIENT WAS TRANSFERRED TO TRIAGE TEAM.

## 2023-03-14 NOTE — OP NOTE
[address]                [address]                                OPERATIVE REPORT    PATIENT NAME: Savana Tatum                   :        1955  MED REC NO:   [id number]                          ROOM:  ACCOUNT NO:   [de-identified]                           ADMIT DATE: 2023  PROVIDER:     Gigi Emmanuel MD    DATE OF PROCEDURE:  2023    PREOPERATIVE DIAGNOSES:  Massive rotator cuff tear, right shoulder;  irreparable, pseudoparalysis. POSTOPERATIVE DIAGNOSES:  Massive rotator cuff tear, right shoulder;  irreparable, pseudoparalysis with retained suture anchors and extensive  postsurgical adhesions. PROCEDURES:  Right shoulder examination under anesthesia, arthrotomy,  removal of multiple suture anchors, open lysis of adhesions, reverse  total shoulder replacement. ANESTHESIA:  General anesthesia, interscalene block. IV FLUIDS:  1400 mL of crystalloids. ESTIMATED BLOOD LOSS:  200 mL. COMPLICATIONS:  None. SURGEON:  Gigi Emmanuel MD    ASSISTANT:  Inez Vu MD.  The availability of Dr. Vicente Hill as a skilled  first assistant was critically important during the procedure to help  with glenoid and humeral exposure and help with suture management during  subscapularis repair. IMPLANTS:  [employer] short stem-small shell AltiVate size  12, 32 neutral liner, 32 -4 glenosphere, standard baseplate, four  locking screws. FINDINGS:  Examination under anesthesia revealed slight global motion  deficits with subacromial glenohumeral crepitus. Arthrotomy revealed  massive rotator cuff tear including the subscapularis. There was some  overlying scar. There were multiple retained suture anchors including  [employer] metal suture anchors from prior repairs. Bone quality was  poor.   Both the humeral and glenoid bones were quite diminutive;  however, we were able to use a small shell implant and a small-sized  glenosphere. We were able to get good tension in the residual rotator  cuff. The subscapularis could be identified, prepared, and repaired. We had a good balance of mobility and stability intraoperatively. BRIEF HISTORY AND PRESENTING ILLNESS:  This is a 49-year-old woman with  a longstanding history of right shoulder rotator cuff problems. She had  undergone a remote repair and then underwent revision repair which I  performed about a year ago. She has failed this and this was confirmed  by MRI. Range of motion actually worsened after she tore her  subscapularis as well. This was confirmed by imaging. We recommended  reverse total shoulder replacement. She was counseled about risks and  alternatives. She understood the risks such as bleeding, infection,  anesthetic risks, injury to nerve and blood vessels, stiffness,  weakness, incomplete pain relief, and need for further surgery. She  understood all of this, wished to proceed, gave informed consent. She  was scheduled on an elective basis after appropriate preoperative  medical clearance. DESCRIPTION OF PROCEDURE:  On the day of procedure, she was brought back  to the operating room, placed on the operating table. General  anesthesia was established. Preoperative antibiotics and interscalene  block were given in the holding area. Under anesthesia, exam was  carried out with the findings as above. The patient was positioned  using a [address] chair position in a semi-sitting position, trunk  elevated to 45 degrees. All pressure points were padded. The patient's  right shoulder and arm were prepped and draped in a standard manner for  shoulder replacement surgery. We used [place] to cover the operative  field. We used a [employer] Spider arm mariscal to set the arm in position. All members of the surgical team wore body exhaust suits.   We approached  the shoulder through a 10-cm oblique incision from coracoid coursing  down towards the level of the deltoid tubercle. The incision was  carried down with a skin knife through the skin and subcutaneous tissue. Electrocautery was used to establish hemostasis. Gelpi retractor was  placed. Deltopectoral interval was identified and developed with a  combination of blunt and sharp dissection. The cephalic vein was  retracted laterally and protected throughout the procedure. A couple of  crossing vessels were ligated with cautery. We released subdeltoid,  subacromial adhesions. There was thickened clavipectoral fascia and a  portion of this was excised. We identified the rotator cuff. We took  down subscapularis remnant and released inferior capsule. We cauterized  the anterior cervical vessel. Posteriorly, we internally rotated the  arm and removed all the pseudocapsule and removed multiple sutures from  prior repair. We could see that the teres minor was still intact. We  hyperexternally rotated the arm. We placed our cutting guide in  appropriate height and retroversion, pinned the guide in place. Prior  to that, we used a rongeur to remove some osteophytes. We resected the  humeral head. We took a standard cut. We removed some additional  osteophytes that came into view. We could see the suture anchors and  these were later on removed. At this point, we turned our attention  towards the glenoid. We used Fukuda retractor to retract the humeral  head out of the way. We excised the anterior capsule and excised the  rotator interval scar. We did a 360-degree release of the subscapularis  which we had tagged. We placed our anterior glenoid retractor and  resected the anterior, inferior, and posterior labrum. We then released  inferior capsule with cautery staying right on bone. We also did our  posterior release. We had a good en face view of the glenoid. We used  a Ross elevator to remove the residual cartilage.   We drilled our center  drill incorporating a little bit inferior tilt. We were right at the  center on the glenoid because the glenoid was quite small. We measured  to 30 mm, inserted our tap, reamed over the tap, we started with a small  reamer and reaming predominantly inferiorly. We good smiley face of  exposed subchondral bone. We removed the tap, we irrigated. We  inserted our baseplate and gauge. We positioned the baseplate so we  could get at base of coracoid screw. We placed our guide and drilled  peripheral locking screws. We recorded the screw lengths with the  calibrated drill bit. The anterior-superior screw was 30 mm in length,  posteriorly 22, and the other ones were shorter. We inserted these  under power and then hand tightened. We then irrigated and inserted  empirically 32 -4 glenosphere, impacted in place and inserted the set  screw. We engaged with torque-limited . We dislocated the  humeral head to repair. We used canal finder to find the canal, reamed  up to size 12. At this point, we could see that there was some suture  anchor material; so, we removed these. We used a wire passer and drill  bit to [place] around the metal anchor. We removed it atraumatically. We  then broached up to size 12 broach, seated the broach. We used a small  shell metaphyseal reamer and reamed concentrically. We left only a  little bit of bone anteriorly and posteriorly because of how small the  proximal humerus was. We irrigated copiously. We used a wire-passing  drill bit and drilled six drill holes centered from the osteotomy site  starting behind the bicipital groove. We placed #2 Ethibond sutures  through these for subscapularis repair. We brought in our implant which  was a short stem-small shell implant. We impacted in place. We trialed  the 32 neutral liner. We were able to get it reduced; so, we replaced  the trial liner with definitive liner and impacted that in place.   We  reduced the shoulder one final time, it was just a little bit tight, but  it was quite stable. Subscapularis mobilized nicely; so, we passed the  suture in the osteotomy site to the subscapularis and tied these  stitches with the arm in external rotation and getting the arm to about  40 degrees of external rotation by the time we tied the inferior  sutures. We irrigated copiously and closed the wound in layers. We  used 1 gm of vancomycin powder first and used 0 Vicryl in a  figure-of-eight fashion to close the deltopectoral interval, 2-0 Vicryl  in interrupted inverted fashion to close the subcutaneous tissue. Routine skin closure with 4-0 Monocryl and Prineo dressing to close the  skin. Sterile compressive dressing was applied. The arm was placed in  padded soft brace. The patient was repositioned in supine position  before this and promptly awakened from anesthesia, having tolerated the  procedure well and taken from the operating room to the recovery room in  satisfactory condition. PLAN:  The patient will be discharged on oral analgesics with  instructions to begin outpatient physical therapy. She will follow up  in the office in one week.         Lauren Salgado MD    D: 03/13/2023 9:57:52       T: 03/13/2023 20:35:21     [email or web address]  Job#: [id number]     Doc#: [id number]    CC:

## 2023-03-14 NOTE — TELEPHONE ENCOUNTER
General Question     Subject: R SHOULDER PAIN  Patient and /or Facility Request: N[facility] Dan  Contact Number: [phone]    PATIENT CALLED IN HAVING R SHOULDER PAIN. WONDERING WHAT SHE CAN TAKE IN BETWEEN HER PAIN MEDS. .. PLEASE SANDRA PATIENT BACK AT THE ABOVE NUMBER. ..

## 2023-03-14 NOTE — TELEPHONE ENCOUNTER
DID RECEIVE PATIENT CALL. PATIENT STATES SHE DID SPEAK TO JOSE CARLOS THIS MORNING AND WAS TOLD TO CALL BACK IF PAIN GOT WORSE. SHE SAID PAIN IS WORSE ALONG WITH 99.6 TEMP, WHICH SHE USUALLY RUNS ON THE LOW SIDE 96-97. WILL REACH OUT TO DR. Becki Stafford.    [phone]

## 2023-03-22 ENCOUNTER — TELEPHONE (OUTPATIENT)
Dept: ORTHOPEDIC SURGERY | Age: 68
End: 2023-03-22

## 2023-03-22 ENCOUNTER — OFFICE VISIT (OUTPATIENT)
Dept: ORTHOPEDIC SURGERY | Age: 68
End: 2023-03-22

## 2023-03-22 ENCOUNTER — HOSPITAL ENCOUNTER (OUTPATIENT)
Dept: PHYSICAL THERAPY | Age: 68
Setting detail: THERAPIES SERIES
End: 2023-03-22
Payer: MEDICARE

## 2023-03-22 VITALS — HEIGHT: 63 IN | BODY MASS INDEX: 18.25 KG/M2 | WEIGHT: 103 LBS

## 2023-03-22 DIAGNOSIS — Z96.611 STATUS POST REVERSE ARTHROPLASTY OF RIGHT SHOULDER: ICD-10-CM

## 2023-03-22 DIAGNOSIS — M75.121 COMPLETE TEAR OF RIGHT ROTATOR CUFF, UNSPECIFIED WHETHER TRAUMATIC: Primary | ICD-10-CM

## 2023-03-22 PROCEDURE — 97161 PT EVAL LOW COMPLEX 20 MIN: CPT | Performed by: PHYSICAL THERAPIST

## 2023-03-22 NOTE — PROGRESS NOTES
replacement. All the components are in good placement without any signs of loosening, fractures, subluxations or dislocations. Impression: Stable postop x-ray. Assessment :  Ms. Bobby Gloria is a pleasant 69 y.o. patient who is 9 days out following a right reverse total shoulder replacement on 3/13/23. Impression:  Encounter Diagnoses   Name Primary? Complete tear of right rotator cuff, unspecified whether traumatic Yes    Status post reverse arthroplasty of right shoulder        Office Procedures:  Orders Placed This Encounter   Procedures    XR SHOULDER RIGHT (MIN 2 VIEWS)     Standing Status:   Future     Number of Occurrences:   1     Standing Expiration Date:   3/22/2024     Order Specific Question:   Reason for exam:     Answer:   pain    [facility])- [facility]     Referral Priority:   Routine     Referral Type:   Eval and Treat     Referral Reason:   Specialty Services Required     Requested Specialty:   Physical Therapist     Number of Visits Requested:   1       Treatment Plan:    Overall Bobby Gloria is doing well. The pain is well-controlled. We recommend that She wear the UltraSling brace at all times with the exception of clothing, bathing and physical therapy. The patient was told that she is restricted from driving for at least 3 weeks postop. All of her questions were fully answered today. We would like to see Bobby Gloria back in 2 weeks for follow-up visit. Bobby Gloria is in agreement with this plan. 3/22/2023  12:13 PM    Juan Oleary, ATC  [employer] [address]    During this examination, I, Cassandra Devi, functioned as a scribe for Dr. Lenise Holter. The history taking and physical examination were performed by Dr. Bailey Villagran. All counseling during the appointment was performed between the patient and Dr. Bailey Villagran.  3/22/2023  ______________________  Dr. KITTY

## 2023-03-26 NOTE — PLAN OF CARE
I-IV mobilizations, spinal mobilization/manipulation. 3. Modalities as needed including: thermal agents, E-stim, US, iontophoresis as indicated. 4. Patient education on joint protection, activity modification, progression of HEP. HEP instruction: sling education     GOALS:  Patient stated goal: \"full return to 100%\"    Therapist goals for Patient:   Short Term Goals: To be achieved in: 2 weeks  - Independent in HEP and progression per patient tolerance, in order to prevent re-injury. -  Patient will have a decrease in pain to facilitate improvement in movement, function, and ADLs as indicated by Functional Deficits. Long Term Goals: To be achieved in: 12 weeks  - The patient is expected to demonstrate less than 36% impairment, limitation or restriction in:  - carrying, moving and handling objects. - Patient will demonstrate increased passive ROM to 120, 20 er to allow for proper joint functioning as indicated by patients Functional Deficits. - Patient will report no greater than 3/10 pain avg.  - Patient will report only minimal sleep disruptions secondary to shoulder pain.      Electronically signed by:  Rena Wilkinson PT, MPT

## 2023-03-26 NOTE — FLOWSHEET NOTE
[facility] and [address],  Sports Performance and Rehabilitation, [facility] [address]  [address] [facility],5[address] Floor   Jose Antonio Rodriguez  Phone: [phone]  Fax: [phone]     Physical Therapy Treatment Note/ Progress Report:           Date:  3/22/2023    Patient Name:  Emily Galvez    :  1955  MRN: [id number]  Restrictions/Precautions:    Medical/Treatment Diagnosis Information:    M25.511 (ICD-10-CM) - Right shoulder pain     Insurance/Certification information:     Physician Information:     Has the plan of care been signed (Y/N):        []  Yes  [x]  No     Date of Patient follow up with Physician:       Is this a Progress Report:     []  Yes  [x]  No        If Yes:  Date Range for reporting period:  Beginning  Ending    Progress report will be due (10 Rx or 30 days whichever is less):        Recertification will be due (POC Duration  / 90 days whichever is less):          Visit # Insurance Allowable Auth Required   1  []  Yes []  No        Functional Scale:     Date assessed:        Latex Allergy:  [x]NO      []YES  Preferred Language for Healthcare:   [x]English       []other:      Pain level:  7/10     SUBJECTIVE:  See eval    OBJECTIVE: See eval  Observation:   Test measurements:      RESTRICTIONS/PRECAUTIONS:    Exercises/Interventions:       Therapeutic Ex (19423) Sets/sec Reps Notes/CUES   Education   17'                                                                       Manual Intervention (13438)      Prom/stm                                                                                                                                               Therapeutic Exercise and NMR EXR  [x] (37181) Provided verbal/tactile cueing for activities related to strengthening, flexibility, endurance, ROM for improvements in LE, proximal hip, and core control with self care, mobility, lifting, ambulation.   [x] (73677) Provided verbal/tactile cueing for

## 2023-03-31 ENCOUNTER — HOSPITAL ENCOUNTER (OUTPATIENT)
Dept: PHYSICAL THERAPY | Age: 68
Setting detail: THERAPIES SERIES
Discharge: HOME | End: 2023-03-31
Payer: MEDICARE

## 2023-03-31 PROCEDURE — 97140 MANUAL THERAPY 1/> REGIONS: CPT | Performed by: PHYSICAL THERAPIST

## 2023-03-31 PROCEDURE — 97110 THERAPEUTIC EXERCISES: CPT | Performed by: PHYSICAL THERAPIST

## 2023-04-02 NOTE — FLOWSHEET NOTE
[facility] and [address],  Sports Performance and Rehabilitation, [facility] [address] [place] [address] Street  [address] [facility],5[address] Floor   Jose Antonio Rodriguez  Phone: [phone]  Fax: [phone]     Physical Therapy Treatment Note/ Progress Report:           Date:  3/31/2023    Patient Name:  Sigrid Rolle    :  1955  MRN: [id number]  Restrictions/Precautions:    Medical/Treatment Diagnosis Information:    M25.511 (ICD-10-CM) - Right shoulder pain     Insurance/Certification information:     Physician Information:     Has the plan of care been signed (Y/N):        []  Yes  [x]  No     Date of Patient follow up with Physician:       Is this a Progress Report:     []  Yes  [x]  No        If Yes:  Date Range for reporting period:  Beginning  Ending    Progress report will be due (10 Rx or 30 days whichever is less):        Recertification will be due (POC Duration  / 90 days whichever is less):          Visit # Insurance Allowable Auth Required   1  []  Yes []  No        Functional Scale:     Date assessed:        Latex Allergy:  [x]NO      []YES  Preferred Language for Healthcare:   [x]English       []other:      Pain level:  4/10     SUBJECTIVE:  \"not sure\"    OBJECTIVE:   Observation:   Test measurements:      RESTRICTIONS/PRECAUTIONS:    Exercises/Interventions:       Therapeutic Ex (41646) Sets/sec Reps Notes/CUES   Education/hep/ll-LD bicep   15'                                                                      Manual Intervention (64884)      Prom/stm   10'                                                                                                                                            Therapeutic Exercise and NMR EXR  [x] (61268) Provided verbal/tactile cueing for activities related to strengthening, flexibility, endurance, ROM for improvements in LE, proximal hip, and core control with self care, mobility, lifting, ambulation.   [x] (99145) Provided verbal/tactile

## 2023-04-04 ENCOUNTER — OFFICE VISIT (OUTPATIENT)
Dept: ORTHOPEDIC SURGERY | Age: 68
End: 2023-04-04

## 2023-04-04 VITALS — WEIGHT: 103 LBS | BODY MASS INDEX: 18.25 KG/M2 | HEIGHT: 63 IN

## 2023-04-04 DIAGNOSIS — Z96.611 STATUS POST REVERSE ARTHROPLASTY OF RIGHT SHOULDER: Primary | ICD-10-CM

## 2023-04-04 PROCEDURE — 99024 POSTOP FOLLOW-UP VISIT: CPT | Performed by: ORTHOPAEDIC SURGERY

## 2023-04-04 NOTE — PROGRESS NOTES
History of Present Illness:  Yary Teran is a pleasant 69 y.o. female who presents for a post operative visit. She is 3 weeks s/p right reverse total shoulder arthroplasty on 3/13/23. Overall She is doing okay and feels that their pain is well controlled with current pain medications. She has been compliant with wearing the UltraSling brace at all times. She has continued in physical therapy at the [facility] office. She does have an upcoming trip to [place] and plans to stay one month. She denies fevers, chills, numbness, tingling, and shortness of breath. She is accompanied today by her sister. Medical History:  Patient's medications, allergies, past medical, surgical, social and family histories were reviewed and updated as appropriate. No notes on file    Review of Systems  A 14 point review of systems was completed by the patient and is available in the media section of the scanned medical record and was reviewed on 4/4/2023. Vital Signs: There were no vitals filed for this visit. General/Appearance: Alert and oriented and in no apparent distress. Skin:  There are no skin lesions, cellulitis, or extreme edema. The patient has warm and well-perfused Bilateral upper extremities with brisk capillary refill. Right Shoulder Exam:    Inspection: Shoulder incision is clean, dry and intact and well approximated. The Prineo dressing is still in place. Mild ecchymosis and swelling are present as can be expected. There is no erythema, drainage or other signs of infection    Palpation:  No crepitus to gentle motion    Active Assisted Range of Motion: Deferred    Passive Range of Motion:  Deferred    Strength:  Deferred    Special Tests:  Deferred. Neurovascular: Sensation to light touch is intact, no motor deficits, palpable radial pulses 2+    Radiology:     No new XR obtained at this time.           Assessment :  Ms. Yary Teran is a pleasant 69 y.o. patient who is now 3 weeks s/p right

## 2023-04-04 NOTE — LETTER
Shoulder Elbow Rehabilitation Referral    Patient Name: Jovita Garcia      YOB: 1955    Diagnosis:   1. Status post reverse arthroplasty of right shoulder        Precautions: Subscap, NWB RUE    Post Op Instructions:  [] Continuous passive motion (CPM)  [x] Active Elbow range of motion  [] Exercise in plane of scapula   [x]  Strengthening     [] Pulley and instruction    [x] Home exercise program (copy to patient)   [x] Sling when arm at risk  [] Sling or brace at all times   [x] AAROM: Forward elevation to 90            [x] AAROM: External rotation to 20    [] Isometric external rotator strengthening [] AAROM: internal rotation: up the back  [x] Isometric abductor strengthening  [] AAROM: Internal abduction     [] Isometric internal rotator strengthening [] AAROM: cross-body adduction             Stretching:     Strengthening:  [] Four quadrant (FE, ER, IR, CBA)  [] Rotator cuff (ER, IR, Abd)  [] Forward Elevation    [] External Rotators     [] External Rotation    [] Internal Rotators  [] Internal Rotation: up/back   [] Abductors     [] Internal Rotation: supine in abduction  [] Flexors  [] Cross-body abduction    [] Extensors  [x] Pendulum (FE, Abd/Add, cw/ccw)  [x] Scapular Stabilizers   [] Wall-walking (FE, Abd)    [x] Shoulder shrugs     [] Table slides      [x] Rhomboid pinch  [] Elbow (flex, ext, pron, sup)    [] Lat.  Pull downs     [] Medial epicondylitis program    [] Forward punch   [] Lateral epicondylitis program    [] Internal rotators     [] Progressive resistive exercises  [] Bench Press        [] Bench press plus  Activities:     [] Lateral pull-downs  [] Rowing     [] Progressive two-hand supine press  [] Stepper/Exercise bike   [] Biceps: curls/supination  [] Swimming  [] Water exercises    Modalities: PRN    Return to Sport:  [] Ultrasound     [] Plyometrics  [] Iontophoresis     [] Rhythmic stabilization  [] Moist heat     [] Core strengthening   [] Massage     [] Sports

## 2023-04-06 ENCOUNTER — APPOINTMENT (OUTPATIENT)
Dept: PHYSICAL THERAPY | Age: 68
End: 2023-04-06
Payer: MEDICARE

## 2023-04-06 ENCOUNTER — HOSPITAL ENCOUNTER (OUTPATIENT)
Dept: PHYSICAL THERAPY | Age: 68
Setting detail: THERAPIES SERIES
Discharge: HOME | End: 2023-04-06
Payer: MEDICARE

## 2023-04-06 PROCEDURE — 97110 THERAPEUTIC EXERCISES: CPT | Performed by: PHYSICAL THERAPIST

## 2023-04-06 PROCEDURE — 97140 MANUAL THERAPY 1/> REGIONS: CPT | Performed by: PHYSICAL THERAPIST

## 2023-04-08 NOTE — FLOWSHEET NOTE
mobility, lifting, ambulation. [x] (97726) Provided verbal/tactile cueing for activities related to improving balance, coordination, kinesthetic sense, posture, motor skill, proprioception to assist with LE, proximal hip, and core control in self-care, mobility, lifting, ambulation and eccentric single leg control. NMR and Therapeutic Activities:    [x] (69477 or 57622) Provided verbal/tactile cueing for activities related to improving balance, coordination, kinesthetic sense, posture, motor skill, proprioception and motor activation to allow for proper function of core, proximal hip and LE with self-care and ADLs and functional mobility.   [] (47119) Gait Re-education- Provided training and instruction to the patient for proper LE, core and proximal hip recruitment and positioning and eccentric body weight control with ambulation re-education including up and down stairs     Home Exercise Program:    [x] (79485) Reviewed/Progressed HEP activities related to strengthening, flexibility, endurance, ROM of core, proximal hip and LE for functional self-care, mobility, lifting and ambulation/stair navigation   [] (23911) Reviewed/Progressed HEP activities related to improving balance, coordination, kinesthetic sense, posture, motor skill, proprioception of core, proximal hip and LE for self-care, mobility, lifting, and ambulation/stair navigation      Manual Treatments:  PROM / STM / Oscillations-Mobs:  G-I, II, III, IV (PA's, Inf., Post.)  [x] (88118) Provided manual therapy to mobilize LE, proximal hip and/or LS spine soft tissue/joints for the purpose of modulating pain, promoting relaxation, increasing ROM, reducing/eliminating soft tissue swelling/inflammation/restriction, improving soft tissue extensibility and allowing for proper ROM for normal function with self-care, mobility, lifting and ambulation. Modalities:     [] GAME READY (VASO)- for significant edema, swelling, pain control.      Charges:  Timed

## 2023-04-18 ENCOUNTER — HOSPITAL ENCOUNTER (OUTPATIENT)
Dept: PHYSICAL THERAPY | Age: 68
Setting detail: THERAPIES SERIES
Discharge: HOME | End: 2023-04-18
Payer: MEDICARE

## 2023-04-18 PROCEDURE — 97110 THERAPEUTIC EXERCISES: CPT | Performed by: PHYSICAL THERAPIST

## 2023-04-18 PROCEDURE — 97140 MANUAL THERAPY 1/> REGIONS: CPT | Performed by: PHYSICAL THERAPIST

## 2023-04-21 ENCOUNTER — HOSPITAL ENCOUNTER (OUTPATIENT)
Dept: PHYSICAL THERAPY | Age: 68
Setting detail: THERAPIES SERIES
Discharge: HOME | End: 2023-04-21
Payer: MEDICARE

## 2023-04-21 PROCEDURE — 97110 THERAPEUTIC EXERCISES: CPT | Performed by: PHYSICAL THERAPIST

## 2023-04-21 PROCEDURE — 97140 MANUAL THERAPY 1/> REGIONS: CPT | Performed by: PHYSICAL THERAPIST

## 2023-04-24 NOTE — FLOWSHEET NOTE
The [facility] and [address],  Sports Performance and Rehabilitation, [facility] [address]  793 [facility],5Th Floor   Jose Antonio Rodriguez  Phone: [phone]  Fax: [phone]     Physical Therapy Treatment Note/ Progress Report:           Date:  2023    Patient Name:  Katarzyna Villatoro    :  1955  MRN: [id number]  Restrictions/Precautions:    Medical/Treatment Diagnosis Information:    M25.511 (ICD-10-CM) - Right shoulder pain     Insurance/Certification information:     Physician Information:     Has the plan of care been signed (Y/N):        []  Yes  [x]  No     Date of Patient follow up with Physician:       Is this a Progress Report:     []  Yes  [x]  No        If Yes:  Date Range for reporting period:  Beginning  Ending    Progress report will be due (10 Rx or 30 days whichever is less):        Recertification will be due (POC Duration  / 90 days whichever is less):          Visit # Insurance Allowable Auth Required   6  []  Yes []  No        Functional Scale:     Date assessed:        Latex Allergy:  [x]NO      []YES  Preferred Language for Healthcare:   [x]English       []other:      Pain level:  nr/10     SUBJECTIVE:  \"I am doing ok. .\"    OBJECTIVE:   Observation: 90, 20 (light rom)  Test measurements:      RESTRICTIONS/PRECAUTIONS:    Exercises/Interventions:       Therapeutic Ex (37517) Sets/sec Reps Notes/CUES   Education/hep/ll-LD bicep      AA: er     Pendulum   10x each    Pinches   20x     Upper trap   4 x 20\"    Self bicep   20x     Abd iso  10 x 10\"    Table stretch   5 x 10\"                            Manual Intervention (62412)      stm   18'                                                                                                                                            Therapeutic Exercise and NMR EXR  [x] (29915) Provided verbal/tactile cueing for activities related to strengthening, flexibility, endurance, ROM for improvements in

## 2023-04-25 ENCOUNTER — TELEPHONE (OUTPATIENT)
Dept: ORTHOPEDIC SURGERY | Age: 68
End: 2023-04-25

## 2023-04-25 ENCOUNTER — HOSPITAL ENCOUNTER (OUTPATIENT)
Dept: PHYSICAL THERAPY | Age: 68
Setting detail: THERAPIES SERIES
Discharge: HOME | End: 2023-04-25
Payer: MEDICARE

## 2023-04-25 ENCOUNTER — OFFICE VISIT (OUTPATIENT)
Dept: ORTHOPEDIC SURGERY | Age: 68
End: 2023-04-25

## 2023-04-25 VITALS — HEIGHT: 63 IN | BODY MASS INDEX: 18.25 KG/M2 | WEIGHT: 103 LBS

## 2023-04-25 DIAGNOSIS — M25.511 RIGHT SHOULDER PAIN, UNSPECIFIED CHRONICITY: ICD-10-CM

## 2023-04-25 DIAGNOSIS — Z96.611 STATUS POST REVERSE ARTHROPLASTY OF RIGHT SHOULDER: Primary | ICD-10-CM

## 2023-04-25 PROCEDURE — 97140 MANUAL THERAPY 1/> REGIONS: CPT | Performed by: PHYSICAL THERAPIST

## 2023-04-25 PROCEDURE — 97110 THERAPEUTIC EXERCISES: CPT | Performed by: PHYSICAL THERAPIST

## 2023-04-25 PROCEDURE — 99024 POSTOP FOLLOW-UP VISIT: CPT | Performed by: ORTHOPAEDIC SURGERY

## 2023-04-25 NOTE — TELEPHONE ENCOUNTER
General Question     Subject: PATIENT STATES SHE IS NEEDING A REFERRAL SENT TO PHYSICAL THERAPY THAT SHE DISCUSSED WITH DR. AMOR. PLEASE ADVISE.   Patient Ryan Door Number: [phone]

## 2023-04-25 NOTE — PROGRESS NOTES
History of Present Illness:  Yary Teran is a 69 y.o. female who presents for a post operative visit. The patient underwent a right reverse total shoulder arthroplasty on 3/13/2023. She is 6 weeks postop. Patient reports her pain levels are trending downward. She continues to work with Alejandro Collins at [facility] for physical therapy twice a week. The patient reports at the end of this week she is planning for a trip to go down to [place] for a total of 4 weeks. The patient would like to discuss that further today. She denies any new injury since her last visit with us. The patient deny fevers, chills, numbness, tingling, and shortness of breath. Medical History:  Patient's medications, allergies, past medical, surgical, social and family histories were reviewed and updated as appropriate. No notes on file    Review of Systems  A 14 point review of systems was completed by the patient is available in the media section of the scanned medical record and was reviewed on 4/25/2023. Vital Signs: There were no vitals filed for this visit. General/Appearance: Alert and oriented and in no apparent distress. Skin:  There are no skin lesions, cellulitis, or extreme edema. The patient has warm and well-perfused Bilateral upper extremities with brisk capillary refill.      right Shoulder Exam:    Inspection: right shoulder incision that is clean, dry and intact and well approximated. There is no erythema, drainage or other signs of infection    Palpation:  No crepitus to gentle motion    Active Range of Motion: Forward elevation to 70 degrees, external rotation of 20 degrees. Internal rotation to back pocket  Passive Range of Motion: Forward elevation to 110 degrees    Strength:  Deferred    Special Tests:  Deferred.     Neurovascular: Sensation to light touch is intact, no motor deficits, palpable radial pulses 2+    Radiology:     Plain radiographs of the right shoulder comprising 3 views: AP in

## 2023-04-26 ENCOUNTER — HOSPITAL ENCOUNTER (OUTPATIENT)
Dept: PHYSICAL THERAPY | Age: 68
Setting detail: THERAPIES SERIES
Discharge: HOME | End: 2023-04-26
Payer: MEDICARE

## 2023-04-26 PROCEDURE — 97140 MANUAL THERAPY 1/> REGIONS: CPT | Performed by: PHYSICAL THERAPIST

## 2023-04-26 PROCEDURE — 97110 THERAPEUTIC EXERCISES: CPT | Performed by: PHYSICAL THERAPIST

## 2023-04-27 ENCOUNTER — TELEPHONE (OUTPATIENT)
Dept: ORTHOPEDIC SURGERY | Age: 68
End: 2023-04-27

## 2023-04-27 NOTE — TELEPHONE ENCOUNTER
Other PATIENT IS CALLING THE OFFICE TO LET DR Lorena Hutchinson KNOW SHE HAD A FALL ON HER RIGHT SIDE WITH NO PAIN. WANTED TO KNOW IF SHE SHOULD COME IN.  [place]  [phone]

## 2023-04-28 NOTE — FLOWSHEET NOTE
The Health system and [address],  Sports Performance and Rehabilitation, [facility] [address]  793 [facility],5Th Floor   Jose Antonio Rodriguez  Phone: [phone]  Fax: [phone]     Physical Therapy Treatment Note/ Progress Report:           Date:  2023    Patient Name:  Emily Galvez    :  1955  MRN: [id number]  Restrictions/Precautions:    Medical/Treatment Diagnosis Information:    M25.511 (ICD-10-CM) - Right shoulder pain     Insurance/Certification information:     Physician Information:     Has the plan of care been signed (Y/N):        []  Yes  [x]  No     Date of Patient follow up with Physician:       Is this a Progress Report:     []  Yes  [x]  No        If Yes:  Date Range for reporting period:  Beginning  Ending    Progress report will be due (10 Rx or 30 days whichever is less):        Recertification will be due (POC Duration  / 90 days whichever is less):          Visit # Insurance Allowable Auth Required   8  []  Yes []  No        Functional Scale:     Date assessed:        Latex Allergy:  [x]NO      []YES  Preferred Language for Healthcare:   [x]English       []other:      Pain level:  nr/10     SUBJECTIVE:  \"I am doing well. Abby Quarles \"    OBJECTIVE:   Observation: 110, 30  Test measurements:      RESTRICTIONS/PRECAUTIONS:    Exercises/Interventions:       Therapeutic Ex (33712) Sets/sec Reps Notes/CUES   Education/hep/ll-LD bicep      AA: er     Pendulum   10x each    Pinches   20x     Upper trap   4 x 20\"    Self bicep   20x     Abd iso  10 x 10\"    Table stretch   5 x 10\"    Ball roll   10x                       Manual Intervention (43509)      stm   18'                                                                                                                                            Therapeutic Exercise and NMR EXR  [x] (74903) Provided verbal/tactile cueing for activities related to strengthening, flexibility, endurance, ROM for improvements

## 2023-04-28 NOTE — FLOWSHEET NOTE
The [facility] and [address],  Sports Performance and Rehabilitation, [facility] [address] [place] [address]  [address] [place] [address],5Th Floor   [place], [address]  Phone: [phone]  Fax: [phone]     Physical Therapy Treatment Note/ Progress Report:           Date:  2023    Patient Name:  Shi Bajwa    :  1955  MRN: [id number]  Restrictions/Precautions:    Medical/Treatment Diagnosis Information:    M25.511 (ICD-10-CM) - Right shoulder pain     Insurance/Certification information:     Physician Information:     Has the plan of care been signed (Y/N):        []  Yes  [x]  No     Date of Patient follow up with Physician:       Is this a Progress Report:     []  Yes  [x]  No        If Yes:  Date Range for reporting period:  Beginning  Ending    Progress report will be due (10 Rx or 30 days whichever is less):        Recertification will be due (POC Duration  / 90 days whichever is less):          Visit # Insurance Allowable Auth Required   7  []  Yes []  No        Functional Scale:     Date assessed:        Latex Allergy:  [x]NO      []YES  Preferred Language for Healthcare:   [x]English       []other:      Pain level:  nr/10     SUBJECTIVE:  \"I feel pretty good\"    OBJECTIVE:   Observation: 110, 30  Test measurements:      RESTRICTIONS/PRECAUTIONS:    Exercises/Interventions:       Therapeutic Ex (80307) Sets/sec Reps Notes/CUES   Education/hep/ll-LD bicep      AA: er     Pendulum   10x each    Pinches   20x     Upper trap   4 x 20\"    Self bicep   20x     Abd iso  10 x 10\"    Table stretch   5 x 10\"    Ball roll   10x                       Manual Intervention ([id number])      stm   18'                                                                                                                                            Therapeutic Exercise and NMR EXR  [x] (69208) Provided verbal/tactile cueing for activities related to strengthening, flexibility, endurance, ROM for

## 2023-05-02 ENCOUNTER — HOSPITAL ENCOUNTER (OUTPATIENT)
Dept: PHYSICAL THERAPY | Age: 68
Setting detail: THERAPIES SERIES
Discharge: HOME | End: 2023-05-02
Payer: MEDICARE

## 2023-05-02 PROCEDURE — 97110 THERAPEUTIC EXERCISES: CPT | Performed by: PHYSICAL THERAPIST

## 2023-05-02 PROCEDURE — 97140 MANUAL THERAPY 1/> REGIONS: CPT | Performed by: PHYSICAL THERAPIST

## 2023-05-04 ENCOUNTER — HOSPITAL ENCOUNTER (OUTPATIENT)
Dept: PHYSICAL THERAPY | Age: 68
Setting detail: THERAPIES SERIES
Discharge: HOME | End: 2023-05-04
Payer: MEDICARE

## 2023-05-30 ENCOUNTER — OFFICE VISIT (OUTPATIENT)
Dept: ORTHOPEDIC SURGERY | Age: 68
End: 2023-05-30

## 2023-05-30 DIAGNOSIS — M25.511 RIGHT SHOULDER PAIN, UNSPECIFIED CHRONICITY: ICD-10-CM

## 2023-05-30 DIAGNOSIS — Z96.611 STATUS POST REVERSE ARTHROPLASTY OF RIGHT SHOULDER: Primary | ICD-10-CM

## 2023-05-30 PROCEDURE — 99024 POSTOP FOLLOW-UP VISIT: CPT | Performed by: ORTHOPAEDIC SURGERY

## 2023-05-30 NOTE — PROGRESS NOTES
History of Present Illness:  Luis Mclain is a 69 y.o. female who presents for a post operative visit. The patient underwent a right reverse total shoulder arthroplasty on 3/13/2023. Patient is currently 11 weeks postop. She was down in [place] for a little bit and was doing her physical therapy down there. She just returned last Friday and has an appointment to meet with Annaya Hernandez this week. She feels already that the shoulder is in a better place than it was prior to her surgery. She does feel that she may be overdoing it recently. She reports that her sister unfortunately sustained a patellar fracture status post a fall and she has had to take care of her sister because of that. The patient denies any fevers, chills, numbness, tingling, and shortness of breath. Medical History:  Patient's medications, allergies, past medical, surgical, social and family histories were reviewed and updated as appropriate. Review of Systems  A 14 point review of systems was completed by the patient is available in the media section of the scanned medical record and was reviewed on 5/30/2023. Vital Signs: There were no vitals filed for this visit. General/Appearance: Alert and oriented and in no apparent distress. Skin:  There are no skin lesions, cellulitis, or extreme edema. The patient has warm and well-perfused Bilateral upper extremities with brisk capillary refill.      right Shoulder Exam:    Inspection: incision fully healed. Mild ecchymosis and swelling are present as can be expected. There is no erythema, drainage or other signs of infection    Palpation:  No crepitus to gentle motion    Active Range of Motion: Forward elevation to 90, external rotation of 30 degrees, internal rotation to L4    Passive Range of Motion: Forward elevation to 100 degrees    Strength:  Deferred    Special Tests:  Deferred.     Neurovascular: Sensation to light touch is intact, no motor deficits, palpable radial pulses

## 2023-05-31 ENCOUNTER — HOSPITAL ENCOUNTER (OUTPATIENT)
Dept: PHYSICAL THERAPY | Age: 68
Setting detail: THERAPIES SERIES
Discharge: HOME | End: 2023-05-31
Payer: MEDICARE

## 2023-05-31 PROCEDURE — 97110 THERAPEUTIC EXERCISES: CPT | Performed by: PHYSICAL THERAPIST

## 2023-05-31 PROCEDURE — 97140 MANUAL THERAPY 1/> REGIONS: CPT | Performed by: PHYSICAL THERAPIST

## 2023-06-05 ENCOUNTER — HOSPITAL ENCOUNTER (OUTPATIENT)
Dept: PHYSICAL THERAPY | Age: 68
Setting detail: THERAPIES SERIES
Discharge: HOME | End: 2023-06-05
Payer: MEDICARE

## 2023-06-05 PROCEDURE — 97140 MANUAL THERAPY 1/> REGIONS: CPT | Performed by: PHYSICAL THERAPIST

## 2023-06-05 PROCEDURE — 97110 THERAPEUTIC EXERCISES: CPT | Performed by: PHYSICAL THERAPIST

## 2023-06-06 ENCOUNTER — HOSPITAL ENCOUNTER (OUTPATIENT)
Dept: PHYSICAL THERAPY | Age: 68
Setting detail: THERAPIES SERIES
End: 2023-06-06
Payer: MEDICARE

## 2023-06-07 NOTE — FLOWSHEET NOTE
The [facility] and [address],  Sports Performance and Rehabilitation, [facility] [address]  793 [facility],5Th Floor   Jose Antonio Rodriguez  Phone: [phone]  Fax: [phone]     Physical Therapy Treatment Note/ Progress Report:           Date:  2023    Patient Name:  Kristen Whipple    :  1955  MRN: [id number]  Restrictions/Precautions:    Medical/Treatment Diagnosis Information:    M25.511 (ICD-10-CM) - Right shoulder pain     Insurance/Certification information:     Physician Information:     Has the plan of care been signed (Y/N):        []  Yes  [x]  No     Date of Patient follow up with Physician:       Is this a Progress Report:     []  Yes  [x]  No        If Yes:  Date Range for reporting period:  Beginning  Ending    Progress report will be due (10 Rx or 30 days whichever is less):        Recertification will be due (POC Duration  / 90 days whichever is less):          Visit # Insurance Allowable Auth Required   10  []  Yes []  No        Functional Scale:     Date assessed:        Latex Allergy:  [x]NO      []YES  Preferred Language for Healthcare:   [x]English       []other:      Pain level:  nr/10     SUBJECTIVE:  \"I am sore after being in [place]\"    OBJECTIVE:   Observation: 140, 40  Test measurements:      RESTRICTIONS/PRECAUTIONS:    Exercises/Interventions:       Therapeutic Ex (42634) Sets/sec Reps Notes/CUES             T band pinches   20x red    R/s supine   4 x 15\"    Supine r/s  5 x 10\"    bicep   20x     Abd iso  10 x 10\"    Table stretch   5 x 10\"    Standing Ball roll   10x     Wall walk   4 x 10\"    Seated er stretch   4 x 20\"    Supine  elevations   20x     Manual Intervention ([id number])      Prom/stm   25'                                                                                                                                            Therapeutic Exercise and NMR EXR  [x] (52209) Provided verbal/tactile cueing for activities

## 2023-06-08 ENCOUNTER — HOSPITAL ENCOUNTER (OUTPATIENT)
Dept: PHYSICAL THERAPY | Age: 68
Setting detail: THERAPIES SERIES
Discharge: HOME | End: 2023-06-08
Payer: MEDICARE

## 2023-06-08 PROCEDURE — 97140 MANUAL THERAPY 1/> REGIONS: CPT | Performed by: PHYSICAL THERAPIST

## 2023-06-08 PROCEDURE — 97110 THERAPEUTIC EXERCISES: CPT | Performed by: PHYSICAL THERAPIST

## 2023-06-19 ENCOUNTER — HOSPITAL ENCOUNTER (OUTPATIENT)
Dept: PHYSICAL THERAPY | Age: 68
Setting detail: THERAPIES SERIES
Discharge: HOME | End: 2023-06-19
Payer: MEDICARE

## 2023-06-19 PROCEDURE — 97140 MANUAL THERAPY 1/> REGIONS: CPT

## 2023-06-19 PROCEDURE — 97110 THERAPEUTIC EXERCISES: CPT

## 2023-06-19 NOTE — FLOWSHEET NOTE
The [facility] and [address],  Sports Performance and Rehabilitation, [facility] [address] [place] [address]  Phone: [phone]  Fax: [phone]     Physical Therapy Treatment Note/ Progress Report:           Date:  6/15/2023    Patient Name:  Daiana Martínez    :  1955  MRN: [id number]  Restrictions/Precautions:    Medical/Treatment Diagnosis Information:    M25.511 (ICD-10-CM) - Right shoulder pain   M25.511 (ICD-10-CM) - Right shoulder pain  Insurance/Certification information:   [facility]  Physician Information:   Shashi Aponte  Has the plan of care been signed (Y/N):        [x]  Yes  []  No     Date of Patient follow up with Physician:       Is this a Progress Report:     []  Yes  [x]  No        If Yes:  Date Range for reporting period:  Beginning  Ending    Progress report will be due (10 Rx or 30 days whichever is less):        Recertification will be due (POC Duration  / 90 days whichever is less):          Visit # Insurance Allowable Auth Required   15 [facility] []  Yes [x]  No        Functional Scale:     Date assessed:        Latex Allergy:  [x]NO      []YES  Preferred Language for Healthcare:   [x]English       []other:      Pain level:  5/10     SUBJECTIVE:  Pt reports that her shoulder just feels like it aches all the time and she is feeling more and more limited by the pain however she is continuing to do her exercises    OBJECTIVE:   Observation: 140, 40  Test measurements:      RESTRICTIONS/PRECAUTIONS:    Exercises/Interventions:       Therapeutic Ex (76959) Sets/sec Reps Notes/CUES   Supine cane flexion  2x12 3#   Ball rolls up incline  [employer] adduction  X30  x15    Tb extension  2x12 Green   Wall walks  2x10    Rylee   3'     Abd iso  ER iso  IR iso  5x15\"  5x15\"  5x15\"                                           Manual Intervention (.[id number])      Prom/stm   25'                          Therapeutic Exercise and NMR EXR  [x] (90621)

## 2023-06-20 ENCOUNTER — APPOINTMENT (OUTPATIENT)
Dept: PHYSICAL THERAPY | Age: 68
End: 2023-06-20
Payer: MEDICARE

## 2023-06-22 ENCOUNTER — HOSPITAL ENCOUNTER (OUTPATIENT)
Dept: PHYSICAL THERAPY | Age: 68
Setting detail: THERAPIES SERIES
Discharge: HOME | End: 2023-06-22
Payer: MEDICARE

## 2023-06-22 PROCEDURE — 97110 THERAPEUTIC EXERCISES: CPT

## 2023-06-22 PROCEDURE — 97140 MANUAL THERAPY 1/> REGIONS: CPT

## 2023-06-22 NOTE — FLOWSHEET NOTE
The [facility] and [address],  Sports Performance and Rehabilitation, [facility] [address]  793 [facility],5Th Floor   Jose Antonio Rodriguez  Phone: [phone]  Fax: [phone]     Physical Therapy Treatment Note/ Progress Report:           Date:  6/15/2023    Patient Name:  Jesus Bailon    :  1955  MRN: [id number]  Restrictions/Precautions:    Medical/Treatment Diagnosis Information:    M25.511 (ICD-10-CM) - Right shoulder pain   M25.511 (ICD-10-CM) - Right shoulder pain  Insurance/Certification information:   [facility]  Physician Information:   Kaushal Garber  Has the plan of care been signed (Y/N):        [x]  Yes  []  No     Date of Patient follow up with Physician:       Is this a Progress Report:     []  Yes  [x]  No        If Yes:  Date Range for reporting period:  Beginning  Ending    Progress report will be due (10 Rx or 30 days whichever is less):        Recertification will be due (POC Duration  / 90 days whichever is less):          Visit # Insurance Allowable Auth Required   13 [facility] []  Yes [x]  No        Functional Scale:     Date assessed:        Latex Allergy:  [x]NO      []YES  Preferred Language for Healthcare:   [x]English       []other:      Pain level:  2-3/10     SUBJECTIVE:  Pt reports that her shoulder feels a little better overall since her last visit, still hard to reach overhead    OBJECTIVE:   Observation: 140, 40  Test measurements:      RESTRICTIONS/PRECAUTIONS:    Exercises/Interventions:       Therapeutic Ex (26012) Sets/sec Reps Notes/CUES   Supine cane flexion  2x12 3#   Ball rolls up incline  [employer] adduction  X30  x15    Tb extension  2x12 Green   Wall walks  2x10    Rylee   3'     Abd iso  ER iso  IR iso  5x15\"  5x15\"  5x15\"                                           Manual Intervention ([id number])      Prom/stm   25'                          Therapeutic Exercise and NMR EXR  [x] (43110) Provided verbal/tactile cueing for activities related to

## 2023-06-27 ENCOUNTER — APPOINTMENT (OUTPATIENT)
Dept: PHYSICAL THERAPY | Age: 68
End: 2023-06-27
Payer: MEDICARE

## 2023-06-27 ENCOUNTER — HOSPITAL ENCOUNTER (OUTPATIENT)
Dept: PHYSICAL THERAPY | Age: 68
Setting detail: THERAPIES SERIES
Discharge: HOME | End: 2023-06-27
Payer: MEDICARE

## 2023-06-27 ENCOUNTER — OFFICE VISIT (OUTPATIENT)
Dept: ORTHOPEDIC SURGERY | Age: 68
End: 2023-06-27

## 2023-06-27 VITALS — HEIGHT: 63 IN | WEIGHT: 103 LBS | BODY MASS INDEX: 18.25 KG/M2

## 2023-06-27 DIAGNOSIS — Z96.611 STATUS POST REVERSE ARTHROPLASTY OF RIGHT SHOULDER: Primary | ICD-10-CM

## 2023-06-27 PROCEDURE — 97164 PT RE-EVAL EST PLAN CARE: CPT

## 2023-06-27 PROCEDURE — 97110 THERAPEUTIC EXERCISES: CPT

## 2023-06-27 PROCEDURE — 97140 MANUAL THERAPY 1/> REGIONS: CPT

## 2023-06-29 ENCOUNTER — APPOINTMENT (OUTPATIENT)
Dept: PHYSICAL THERAPY | Age: 68
End: 2023-06-29
Payer: MEDICARE

## 2023-06-29 ENCOUNTER — HOSPITAL ENCOUNTER (OUTPATIENT)
Dept: PHYSICAL THERAPY | Age: 68
Setting detail: THERAPIES SERIES
Discharge: HOME | End: 2023-06-29
Payer: MEDICARE

## 2023-06-29 PROCEDURE — 97140 MANUAL THERAPY 1/> REGIONS: CPT

## 2023-06-29 PROCEDURE — 97110 THERAPEUTIC EXERCISES: CPT

## 2023-07-03 ENCOUNTER — APPOINTMENT (OUTPATIENT)
Dept: PHYSICAL THERAPY | Age: 68
End: 2023-07-03
Payer: MEDICARE

## 2023-07-05 ENCOUNTER — HOSPITAL ENCOUNTER (OUTPATIENT)
Dept: PHYSICAL THERAPY | Age: 68
Setting detail: THERAPIES SERIES
Discharge: HOME | End: 2023-07-05
Payer: MEDICARE

## 2023-07-05 PROCEDURE — 97140 MANUAL THERAPY 1/> REGIONS: CPT

## 2023-07-05 PROCEDURE — 97110 THERAPEUTIC EXERCISES: CPT

## 2023-07-05 NOTE — FLOWSHEET NOTE
[facility] and [address] [place] [address] [address],  Sports Performance and Rehabilitation, [address] [place] [address]  200 [facility], [address] [place] [address] Avenue  Phone: [phone]  Fax: [phone]     Physical Therapy Treatment Note/ Progress Report:       Date:  6/15/2023    Patient Name:  Jaqueline Huffman    :  1955  MRN: [id number]  Restrictions/Precautions:    Medical/Treatment Diagnosis Information:    M25.511 (ICD-10-CM) - Right shoulder pain   M25.511 (ICD-10-CM) - Right shoulder pain  Insurance/Certification information:   [facility]  Physician Information:   Rober Gan  Has the plan of care been signed (Y/N):        [x]  Yes  []  No     Date of Patient follow up with Physician:       Is this a Progress Report:     []  Yes  [x]  No        If Yes:  Date Range for reporting period:  Beginning 23  Ending     Progress report will be due (10 Rx or 30 days whichever is less):  3/87/63      Recertification will be due (POC Duration  / 90 days whichever is less):  23        Visit # Insurance Allowable Auth Required   25 [facility] []  Yes [x]  No      Latex Allergy:  [x]NO      []YES  Preferred Language for Healthcare:   [x]English       []other:      Pain level:  2-3/10     SUBJECTIVE:  Pt reports no new issues, feels like her HEP has been improving    OBJECTIVE:      Test used Initial score Current Score  23   Pain Summary VAS  0/10 @ rest   Functional questionnaire UEFI  59/80   A/PROM flex  90/150    abd  60/100    ER  C2/60    IR  L3/NT   Strength flex  3+ (within range)    Abd  3+ (within range)    ER  3+ (within range)    IR  3+ (within range)         RESTRICTIONS/PRECAUTIONS:    Exercises/Interventions:       Therapeutic Ex (70245) Reps Notes/CUES   Supine RS 2x12 e[address]    Supine cane flexion x15 5#   Ball rolls up incline  Ball rolls adduction X30  x15    Tb extension  TB d1 ext 2x12  2x12 Green  Green   Wall walks 2x10    Rylee  3'     Abd iso  ER iso  IR iso 5x15\"  5x15\"  5x15\"

## 2023-07-06 ENCOUNTER — APPOINTMENT (OUTPATIENT)
Dept: PHYSICAL THERAPY | Age: 68
End: 2023-07-06
Payer: MEDICARE

## 2023-07-07 ENCOUNTER — HOSPITAL ENCOUNTER (OUTPATIENT)
Dept: PHYSICAL THERAPY | Age: 68
Setting detail: THERAPIES SERIES
Discharge: HOME | End: 2023-07-07
Payer: MEDICARE

## 2023-07-07 PROCEDURE — 97140 MANUAL THERAPY 1/> REGIONS: CPT

## 2023-07-07 PROCEDURE — 97110 THERAPEUTIC EXERCISES: CPT

## 2023-07-07 NOTE — FLOWSHEET NOTE
[facility] and [address] [place] [address] [address],  Sports Performance and Rehabilitation, [address] [place] [address]  200 [facility], [address] [place] [address] Avenue  Phone: [phone]  Fax: [phone]     Physical Therapy Treatment Note/ Progress Report:       Date:  6/15/2023    Patient Name:  Jd Kitchen    :  1955  MRN: [id number]  Restrictions/Precautions:    Medical/Treatment Diagnosis Information:    M25.511 (ICD-10-CM) - Right shoulder pain   M25.511 (ICD-10-CM) - Right shoulder pain  Insurance/Certification information:   [facility]  Physician Information:   Britany Sauceda  Has the plan of care been signed (Y/N):        [x]  Yes  []  No     Date of Patient follow up with Physician:       Is this a Progress Report:     []  Yes  [x]  No        If Yes:  Date Range for reporting period:  Beginning 23  Ending     Progress report will be due (10 Rx or 30 days whichever is less):        Recertification will be due (POC Duration  / 90 days whichever is less):  23        Visit # Insurance Allowable Auth Required   23 [facility] []  Yes [x]  No      Latex Allergy:  [x]NO      []YES  Preferred Language for Healthcare:   [x]English       []other:      Pain level:  2-3/10     SUBJECTIVE:  Pt reports no new issues, feels like her HEP has been improving    OBJECTIVE:      Test used Initial score Current Score  23   Pain Summary VAS  0/10 @ rest   Functional questionnaire UEFI  59/80   A/PROM flex  90/150    abd  60/100    ER  C2/60    IR  L3/NT   Strength flex  3+ (within range)    Abd  3+ (within range)    ER  3+ (within range)    IR  3+ (within range)         RESTRICTIONS/PRECAUTIONS:    Exercises/Interventions:     Therapeutic Ex (93064) Reps Notes/CUES   Supine RS 2x12 e[address]    Supine cane flexion x15 5#   Sideyling flexion 2x10    Ball rolls up incline  Ball rolls adduction X30  x15    Tb extension  TB d1 ext 2x12  2x12 Green  Green   Wall walks 2x10    Rylee  3'     Abd iso  ER iso  IR

## 2023-07-10 ENCOUNTER — HOSPITAL ENCOUNTER (OUTPATIENT)
Dept: PHYSICAL THERAPY | Age: 68
Setting detail: THERAPIES SERIES
Discharge: HOME | End: 2023-07-10
Payer: MEDICARE

## 2023-07-10 PROCEDURE — 97110 THERAPEUTIC EXERCISES: CPT

## 2023-07-10 PROCEDURE — 97140 MANUAL THERAPY 1/> REGIONS: CPT

## 2023-07-10 NOTE — FLOWSHEET NOTE
The [facility] and 400 [facility] [address],  Sports Performance and Rehabilitation, [address]  [address]  Phone: [phone]  Fax: [phone]     Physical Therapy Treatment Note/ Progress Report:       Date:  6/15/2023    Patient Name:  Orlena Closs    :  1955  MRN: [id number]  Restrictions/Precautions:    Medical/Treatment Diagnosis Information:    M25.511 (ICD-10-CM) - Right shoulder pain   M25.511 (ICD-10-CM) - Right shoulder pain  Insurance/Certification information:   [address] [facility]  Physician Information:   Patsey Kocher  Has the plan of care been signed (Y/N):        [x]  Yes  []  No     Date of Patient follow up with Physician:       Is this a Progress Report:     []  Yes  [x]  No        If Yes:  Date Range for reporting period:  Beginning 23  Ending     Progress report will be due (10 Rx or 30 days whichever is less):        Recertification will be due (POC Duration  / 90 days whichever is less):  23        Visit # Insurance Allowable Auth Required    [address] [facility] []  Yes [x]  No      Latex Allergy:  [x]NO      []YES  Preferred Language for Healthcare:   [x]English       []other:      Pain level:  2-3/10     SUBJECTIVE:  Pt reports no new issues, feels like her HEP has been improving    OBJECTIVE:      Test used Initial score Current Score  23   Pain Summary VAS  0/10 @ rest   Functional questionnaire UEFI  59/80   A/PROM flex  90/150    abd  60/100    ER  C2/60    IR  L3/NT   Strength flex  3+ (within range)    Abd  3+ (within range)    ER  3+ (within range)    IR  3+ (within range)         RESTRICTIONS/PRECAUTIONS:    Exercises/Interventions:     Therapeutic Ex (03445) Reps Notes/CUES   Supine RS 2x12 e[address]    Supine cane flexion x15 5#   Sideyling flexion 2x10    Ball rolls up incline  Ball rolls adduction X30  x15    Tb extension  TB d1 ext 2x12  2x12 Green  Green   Wall walks/ wall slide ( towel) 2x10    [place]  3'

## 2023-07-13 ENCOUNTER — HOSPITAL ENCOUNTER (OUTPATIENT)
Dept: PHYSICAL THERAPY | Age: 68
Setting detail: THERAPIES SERIES
Discharge: HOME | End: 2023-07-13
Payer: MEDICARE

## 2023-07-13 PROCEDURE — 97140 MANUAL THERAPY 1/> REGIONS: CPT

## 2023-07-13 PROCEDURE — 97110 THERAPEUTIC EXERCISES: CPT

## 2023-07-17 ENCOUNTER — HOSPITAL ENCOUNTER (OUTPATIENT)
Dept: PHYSICAL THERAPY | Age: 68
Setting detail: THERAPIES SERIES
Discharge: HOME | End: 2023-07-17
Payer: MEDICARE

## 2023-07-17 PROCEDURE — 97140 MANUAL THERAPY 1/> REGIONS: CPT

## 2023-07-17 PROCEDURE — 97110 THERAPEUTIC EXERCISES: CPT

## 2023-07-17 NOTE — FLOWSHEET NOTE
complexities/Impairments listed. [] Progression has been slowed due to co-morbidities. [] Plan just implemented, too soon to assess goals progression <30days   [] Goals require adjustment due to lack of progress  [] Patient is not progressing as expected and requires additional follow up with physician  [] Other    Prognosis for POC: [x] Good [] Fair  [] Poor      Patient requires continued skilled intervention: [x] Yes  [] No    Treatment/Activity Tolerance:  [x] Patient able to complete treatment  [] Patient limited by fatigue  [] Patient limited by pain    [] Patient limited by other medical complications  [] Other:       PLAN: 2x/week for up to 12 more weeks  [x] Continue per plan of care [] Alter current plan (see comments above)  [] Plan of care initiated [] Hold pending MD visit [] Discharge      Electronically signed by:  Purvi Tineo, PT, DPT, SCS, Jessica Wren, SPT  Therapist was present, directed the patient's care, made skilled judgement, and was responsible for assessment and treatment of the patient. Note: If patient does not return for scheduled/ recommended follow up visits, this note will serve as a discharge from care along with most recent update on progress.

## 2023-07-20 ENCOUNTER — HOSPITAL ENCOUNTER (OUTPATIENT)
Dept: PHYSICAL THERAPY | Age: 68
Setting detail: THERAPIES SERIES
Discharge: HOME | End: 2023-07-20
Payer: MEDICARE

## 2023-07-20 PROCEDURE — 97140 MANUAL THERAPY 1/> REGIONS: CPT

## 2023-07-20 PROCEDURE — 97110 THERAPEUTIC EXERCISES: CPT

## 2023-07-20 NOTE — FLOWSHEET NOTE
impact on the rate of recovery. []  The patient had a prior episode of outpatient therapy during this calendar year for a different condition. []  The patient has a mental or cognitive disorder in addition to the condition being treated that will have a direct and significant impact on the rate of recovery. ASSESSMENT:  Serina Baum tolerated session well today, making expected improvements. She added in resisted ER/IR with very good form. She would continue to benefit from skilled physical therapy to maximize her functional outcomes and progress towards goals. GOALS:   Patient stated goal:   To get full ability back in her shoulder  [x] Progressing: [] Met: [] Not Met: [] Adjusted    Therapist goals for Patient:   Short Term Goals: To be achieved in: 2 weeks  1. Independent in HEP and progression per patient tolerance, in order to prevent re-injury. [] Progressing: [x] Met: [] Not Met: [] Adjusted   2. Patient will have a decrease in pain to facilitate improvement in movement, function, and ADLs as indicated by Functional Deficits. [] Progressing: [x] Met: [] Not Met: [] Adjusted    Long Term Goals: To be achieved in:  12 weeks  - The patient is expected to demonstrate less than 36% impairment, limitation or restriction in:  - carrying, moving and handling objects. [] Progressing: [x] Met: [] Not Met: [] Adjusted  - Patient will demonstrate increased passive ROM to 120, 20 er to allow for proper joint functioning as indicated by patients Functional Deficits. [x] Progressing: [] Met: [] Not Met: [] Adjusted  - Patient will report no greater than 3/10 pain avg.  [] Progressing: [x] Met: [] Not Met: [] Adjusted  - Patient will report only minimal sleep disruptions secondary to shoulder pain.    [] Progressing: [x] Met: [] Not Met: [] Adjusted      Overall Progression Towards Functional goals/ Treatment Progress Update:  [] Patient is progressing as expected towards functional goals

## 2023-07-25 ENCOUNTER — HOSPITAL ENCOUNTER (OUTPATIENT)
Dept: PHYSICAL THERAPY | Age: 68
Setting detail: THERAPIES SERIES
Discharge: HOME | End: 2023-07-25
Payer: MEDICARE

## 2023-07-25 PROCEDURE — 97140 MANUAL THERAPY 1/> REGIONS: CPT

## 2023-07-25 PROCEDURE — 97110 THERAPEUTIC EXERCISES: CPT

## 2023-07-25 NOTE — FLOWSHEET NOTE
The [facility] and 400 [facility] [address],  [facility], [address] [place] [address]  [address]  Phone: [phone]  Fax: [phone]     Physical Therapy Treatment Note/ Progress Report:       Date:  6/15/2023    Patient Name:  Christophe Chavez    :  1955  MRN: [id number]  Restrictions/Precautions:    Medical/Treatment Diagnosis Information:    M25.511 (ICD-10-CM) - Right shoulder pain   M25.511 (ICD-10-CM) - Right shoulder pain  Insurance/Certification information:   [facility]  Physician Information:   Jose Miguel Shaffer  Has the plan of care been signed (Y/N):        [x]  Yes  []  No     Date of Patient follow up with Physician:       Is this a Progress Report:     []  Yes  [x]  No        If Yes:  Date Range for reporting period:  Beginning 23  Ending     Progress report will be due (10 Rx or 30 days whichever is less):        Recertification will be due (POC Duration  / 90 days whichever is less):  23        Visit # Insurance Allowable Auth Required   25 [facility] []  Yes [x]  No      Latex Allergy:  [x]NO      []YES  Preferred Language for Healthcare:   [x]English       []other:      Pain level:  0/10     SUBJECTIVE:  Pt reports she felt a  twinge of discomfort in her shoulder this morning during external rotation exercises.     OBJECTIVE:      Test used Initial score Current Score  23   Pain Summary VAS  0/10 @ rest   Functional questionnaire UEFI  59/80   A/PROM flex  90/150    abd  60/100    ER  C2/60    IR  L3/NT   Strength flex  3+ (within range)    Abd  3+ (within range)    ER  3+ (within range)    IR  3+ (within range)         RESTRICTIONS/PRECAUTIONS:    Exercises/Interventions:     Therapeutic Ex (23719) Reps Notes/CUES   Supine Rhythmic Stabilization 2x12 ea way    Supine cane flexion x15 4# ( decreased d/t pain in ant delt)   Sideyling flexion  2x10 1 lb   Ball rolls up incline  X30     2x12 1#   [place]  3'

## 2023-07-27 ENCOUNTER — HOSPITAL ENCOUNTER (OUTPATIENT)
Dept: PHYSICAL THERAPY | Age: 68
Setting detail: THERAPIES SERIES
Discharge: HOME | End: 2023-07-27
Payer: MEDICARE

## 2023-07-27 PROCEDURE — 97140 MANUAL THERAPY 1/> REGIONS: CPT

## 2023-07-27 PROCEDURE — 97110 THERAPEUTIC EXERCISES: CPT

## 2023-07-27 NOTE — FLOWSHEET NOTE
patient had a prior episode of outpatient therapy during this calendar year for a different condition. []  The patient has a mental or cognitive disorder in addition to the condition being treated that will have a direct and significant impact on the rate of recovery. ASSESSMENT:   Radhames Desai tolerated her session well today. We eliminated the weighted wall slides this session. She would continue to benefit from skilled physical therapy to maximize her functional outcomes and progress towards goals. GOALS:   Patient stated goal:   To get full ability back in her shoulder  [x] Progressing: [] Met: [] Not Met: [] Adjusted    Therapist goals for Patient:   Short Term Goals: To be achieved in: 2 weeks  1. Independent in HEP and progression per patient tolerance, in order to prevent re-injury. [] Progressing: [x] Met: [] Not Met: [] Adjusted   2. Patient will have a decrease in pain to facilitate improvement in movement, function, and ADLs as indicated by Functional Deficits. [] Progressing: [x] Met: [] Not Met: [] Adjusted    Long Term Goals: To be achieved in:  12 weeks  - The patient is expected to demonstrate less than 36% impairment, limitation or restriction in:  - carrying, moving and handling objects. [] Progressing: [x] Met: [] Not Met: [] Adjusted  - Patient will demonstrate increased passive ROM to 120, 20 er to allow for proper joint functioning as indicated by patients Functional Deficits. [x] Progressing: [] Met: [] Not Met: [] Adjusted  - Patient will report no greater than 3/10 pain avg.  [] Progressing: [x] Met: [] Not Met: [] Adjusted  - Patient will report only minimal sleep disruptions secondary to shoulder pain. [] Progressing: [x] Met: [] Not Met: [] Adjusted      Overall Progression Towards Functional goals/ Treatment Progress Update:  [] Patient is progressing as expected towards functional goals listed.     [x] Progression is slowed due to

## 2023-07-31 ENCOUNTER — HOSPITAL ENCOUNTER (OUTPATIENT)
Dept: PHYSICAL THERAPY | Age: 68
Setting detail: THERAPIES SERIES
Discharge: HOME | End: 2023-07-31
Payer: MEDICARE

## 2023-07-31 PROCEDURE — 97110 THERAPEUTIC EXERCISES: CPT

## 2023-07-31 PROCEDURE — 97140 MANUAL THERAPY 1/> REGIONS: CPT

## 2023-07-31 PROCEDURE — 97530 THERAPEUTIC ACTIVITIES: CPT

## 2023-07-31 NOTE — FLOWSHEET NOTE
has been slowed due to co-morbidities. [] Plan just implemented, too soon to assess goals progression <30days   [] Goals require adjustment due to lack of progress  [] Patient is not progressing as expected and requires additional follow up with physician  [] Other    Prognosis for POC: [x] Good [] Fair  [] Poor      Patient requires continued skilled intervention: [x] Yes  [] No    Treatment/Activity Tolerance:  [x] Patient able to complete treatment  [] Patient limited by fatigue  [] Patient limited by pain    [] Patient limited by other medical complications  [] Other:       PLAN: 2x/week for up to 12 more weeks  [x] Continue per plan of care [] Alter current plan (see comments above)  [] Plan of care initiated [] Hold pending MD visit [] Discharge      Electronically signed by:  Clifford Harvey, PT, DPT, SCS, Afia Ernst, SPT  Therapist was present, directed the patient's care, made skilled judgement, and was responsible for assessment and treatment of the patient. Note: If patient does not return for scheduled/ recommended follow up visits, this note will serve as a discharge from care along with most recent update on progress.

## 2023-08-03 ENCOUNTER — HOSPITAL ENCOUNTER (OUTPATIENT)
Dept: PHYSICAL THERAPY | Age: 68
Setting detail: THERAPIES SERIES
Discharge: HOME | End: 2023-08-03
Payer: MEDICARE

## 2023-08-03 PROCEDURE — 97110 THERAPEUTIC EXERCISES: CPT

## 2023-08-03 PROCEDURE — 97140 MANUAL THERAPY 1/> REGIONS: CPT

## 2023-08-03 NOTE — FLOWSHEET NOTE
[facility] and [address] [place] [address] [address],  [facility], [address]  200 [facility], [address] [place] [address] Avenue  Phone: [phone]  Fax: [phone]     Physical Therapy Treatment Note/ Progress Report:       Date:  6/15/2023    Patient Name:  Chang Aragon    :  1955  MRN: [id number]  Restrictions/Precautions:    Medical/Treatment Diagnosis Information:    M25.511 (ICD-10-CM) - Right shoulder pain   M25.511 (ICD-10-CM) - Right shoulder pain  Insurance/Certification information:   [facility]  Physician Information:   Dhaval Wills  Has the plan of care been signed (Y/N):        [x]  Yes  []  No     Date of Patient follow up with Physician:       Is this a Progress Report:     [x]  Yes  []  No        If Yes:  Date Range for reporting period:  Beginning 23  Ending 8/3/23    Progress report will be due (10 Rx or 30 days whichever is less):  27      Recertification will be due (POC Duration  / 90 days whichever is less):  23        Visit # Insurance Allowable Auth Required   32 [facility] []  Yes [x]  No      Latex Allergy:  [x]NO      []YES  Preferred Language for Healthcare:   [x]English       []other:      Pain level:  0/10     SUBJECTIVE:  Pt reports her shoulder feels much better. She has not had the shoulder pain since giving up the weighted wall slide exercise.     OBJECTIVE:      Test used Initial score Current Score  8/3/23   Pain Summary VAS  0/10 @ rest   Functional questionnaire UEFI  64/80   A/PROM flex  120/150    abd  100/100    ER  C2/60    IR  L3/45   Strength flex  3+ (within range)    Abd  4 (within range)    ER  4+ (within range)    IR  4+ (within range)         RESTRICTIONS/PRECAUTIONS:    Exercises/Interventions:     Therapeutic Ex (74116) Reps Notes/CUES   Supine Rhythmic Stabilization 2x12 ea way    Supine cane flexion x15 5#   Supine abduction* 2x10 red TB    Sideyling flexion  2x10 1 lb   Ball rolls up incline  [employer]

## 2023-08-04 SDOH — HEALTH STABILITY: PHYSICAL HEALTH: ON AVERAGE, HOW MANY MINUTES DO YOU ENGAGE IN EXERCISE AT THIS LEVEL?: 120 MIN

## 2023-08-04 SDOH — HEALTH STABILITY: PHYSICAL HEALTH: ON AVERAGE, HOW MANY DAYS PER WEEK DO YOU ENGAGE IN MODERATE TO STRENUOUS EXERCISE (LIKE A BRISK WALK)?: 7 DAYS

## 2023-08-07 ENCOUNTER — APPOINTMENT (OUTPATIENT)
Dept: PHYSICAL THERAPY | Age: 68
End: 2023-08-07
Payer: MEDICARE

## 2023-08-07 ENCOUNTER — OFFICE VISIT (OUTPATIENT)
Dept: ORTHOPEDIC SURGERY | Age: 68
End: 2023-08-07
Payer: MEDICARE

## 2023-08-07 DIAGNOSIS — M25.561 RIGHT KNEE PAIN, UNSPECIFIED CHRONICITY: Primary | ICD-10-CM

## 2023-08-07 DIAGNOSIS — S83.241A TEAR OF MEDIAL MENISCUS OF RIGHT KNEE, CURRENT, UNSPECIFIED TEAR TYPE, INITIAL ENCOUNTER: ICD-10-CM

## 2023-08-07 DIAGNOSIS — M17.11 PRIMARY OSTEOARTHRITIS OF RIGHT KNEE: ICD-10-CM

## 2023-08-07 PROCEDURE — 1090F PRES/ABSN URINE INCON ASSESS: CPT | Performed by: ORTHOPAEDIC SURGERY

## 2023-08-07 PROCEDURE — 3017F COLORECTAL CA SCREEN DOC REV: CPT | Performed by: ORTHOPAEDIC SURGERY

## 2023-08-07 PROCEDURE — 99204 OFFICE O/P NEW MOD 45 MIN: CPT | Performed by: ORTHOPAEDIC SURGERY

## 2023-08-07 PROCEDURE — G8428 CUR MEDS NOT DOCUMENT: HCPCS | Performed by: ORTHOPAEDIC SURGERY

## 2023-08-07 PROCEDURE — G8400 PT W/DXA NO RESULTS DOC: HCPCS | Performed by: ORTHOPAEDIC SURGERY

## 2023-08-07 PROCEDURE — G8419 CALC BMI OUT NRM PARAM NOF/U: HCPCS | Performed by: ORTHOPAEDIC SURGERY

## 2023-08-07 PROCEDURE — 1036F TOBACCO NON-USER: CPT | Performed by: ORTHOPAEDIC SURGERY

## 2023-08-07 PROCEDURE — 1123F ACP DISCUSS/DSCN MKR DOCD: CPT | Performed by: ORTHOPAEDIC SURGERY

## 2023-08-07 NOTE — PROGRESS NOTES
Date:  2023    Name:  Orlena Closs  Address:    [facility] [address]    :  1955      Age:   77 y.o.    SSN:  [id number]      Medical Record Number:  [id number]    Reason for Visit:    Chief Complaint    Knee Pain (New patient right knee )        DOS:2023     HPI: Orlena Closs is a 77 y.o. female here today for evaluation of right knee discomfort. Patient reports that her symptoms of instability are causing her the most difficulty however she does note localized to the right knee. She had an MRI performed outpatient to rule out any ligamentous tear which was found to be positive for medial and lateral meniscal tears. She denies any acute injury associated with her present symptoms. Patient reports that her compression wrap/brace is helpful in alleviating some of her symptoms. She notes swelling with increased activity. She denies any catching or locking symptoms. She has not received any treatment thus far         ROS: Review of systems reviewed from Patient History Form completed today and available in the patient's chart under the Media tab. Past Medical History:   Diagnosis Date    Anxiety     Cancer [facility])     COLON    Osteoarthritis     Osteoporosis         Past Surgical History:   Procedure Laterality Date    COLECTOMY  2009    COLONOSCOPY      POLYPS AND COLON CANCER    COLONOSCOPY  2015    ROTATOR CUFF REPAIR Bilateral     SHOULDER ARTHROSCOPY Right 2022    EVALUATION UNDER ANESTHESIA, RIGHT SHOULDER DIAGNOSTIC ARTHROSCOPY, EXTENSIVE DEBRIDEMENT, LYSIS OF ADHESIONS, REVISION OF RIGHT ROTATOR CUFF REPAIR WITH PATCH AUGMENTATION. REMOVAL OF RETAINED SUTURE.  performed by Mino Hardin MD at [address] [employer] Right 3/13/2023    RIGHT SHOULDER ARTHROTOMY, LYSIS OF ADHESIONS, REMOVAL OF SUTURE ANCHOR, REVERSE TOTAL SHOULDER REPLACEMENT performed by [place] [facility], MD at [facility] History   Problem

## 2023-08-08 ENCOUNTER — OFFICE VISIT (OUTPATIENT)
Dept: ORTHOPEDIC SURGERY | Age: 68
End: 2023-08-08

## 2023-08-08 VITALS — BODY MASS INDEX: 18.25 KG/M2 | HEIGHT: 63 IN | WEIGHT: 103 LBS

## 2023-08-08 DIAGNOSIS — Z96.611 STATUS POST REVERSE ARTHROPLASTY OF RIGHT SHOULDER: Primary | ICD-10-CM

## 2023-08-10 ENCOUNTER — HOSPITAL ENCOUNTER (OUTPATIENT)
Dept: PHYSICAL THERAPY | Age: 68
Setting detail: THERAPIES SERIES
Discharge: HOME | End: 2023-08-10
Payer: MEDICARE

## 2023-08-10 PROCEDURE — 97110 THERAPEUTIC EXERCISES: CPT | Performed by: SPECIALIST/TECHNOLOGIST

## 2023-08-10 PROCEDURE — 97140 MANUAL THERAPY 1/> REGIONS: CPT | Performed by: SPECIALIST/TECHNOLOGIST

## 2023-08-10 NOTE — FLOWSHEET NOTE
[facility] and [address] [place] [address] [address],  [facility], [address]  200 [facility] [address] [place] [address] Avenue  Phone: [phone]  Fax: [phone]     Physical Therapy Treatment Note/ Progress Report:       Date:  8/10/2023    Patient Name:  Dallas Leos    :  1955  MRN: [id number]  Restrictions/Precautions:    Medical/Treatment Diagnosis Information:    M25.511 (ICD-10-CM) - Right shoulder pain   M25.511 (ICD-10-CM) - Right shoulder pain  Insurance/Certification information:   [facility]  Physician Information:   Caron Urrutia  Has the plan of care been signed (Y/N):        [x]  Yes  []  No     Date of Patient follow up with Physician:       Is this a Progress Report:     [x]  Yes  []  No        If Yes:  Date Range for reporting period:  Beginning 23  Ending 8/3/23    Progress report will be due (10 Rx or 30 days whichever is less):  54      Recertification will be due (POC Duration  / 90 days whichever is less):  23        Visit # Insurance Allowable Auth Required   29 [facility] []  Yes [x]  No      Latex Allergy:  [x]NO      []YES  Preferred Language for Healthcare:   [x]English       []other:      Pain level:  0/10     SUBJECTIVE:  Pt reports her shoulder feels much better. She has not had the shoulder pain since giving up the weighted wall slide exercise. She recently started having knee instability and is concerned  more now for her knee than her shoulder.      OBJECTIVE:      Test used Initial score Current Score  8/3/23   Pain Summary VAS  0/10 @ rest   Functional questionnaire UEFI  64/80   A/PROM flex  120/150    abd  100/100    ER  C2/60    IR  L3/45   Strength flex  3+ (within range)    Abd  4 (within range)    ER  4+ (within range)    IR  4+ (within range)         RESTRICTIONS/PRECAUTIONS:    Exercises/Interventions:     Therapeutic Ex (96600) Reps Notes/CUES   Supine Rhythmic Stabilization 2x12 ea way    Supine cane flexion x15 5#

## 2023-08-14 ENCOUNTER — HOSPITAL ENCOUNTER (OUTPATIENT)
Dept: PHYSICAL THERAPY | Age: 68
Setting detail: THERAPIES SERIES
Discharge: HOME | End: 2023-08-14
Payer: MEDICARE

## 2023-08-14 PROCEDURE — 97140 MANUAL THERAPY 1/> REGIONS: CPT

## 2023-08-14 PROCEDURE — 97110 THERAPEUTIC EXERCISES: CPT

## 2023-08-14 NOTE — FLOWSHEET NOTE
[facility] and [address] [place] [address] [address],  [facility], [address]  200 [facility], [address] [place] [address] Avenue  Phone: [phone]  Fax: [phone]     Physical Therapy Treatment Note/ Progress Report:       Date:  8/10/2023    Patient Name:  Richelle Bran    :  1955  MRN: [id number]  Restrictions/Precautions:    Medical/Treatment Diagnosis Information:    M25.511 (ICD-10-CM) - Right shoulder pain   M25.511 (ICD-10-CM) - Right shoulder pain  Insurance/Certification information:   [facility]  Physician Information:   Juan Luis Cordova  Has the plan of care been signed (Y/N):        [x]  Yes  []  No     Date of Patient follow up with Physician:       Is this a Progress Report:     [x]  Yes  []  No        If Yes:  Date Range for reporting period:  Beginning 23  Ending 8/3/23    Progress report will be due (10 Rx or 30 days whichever is less):  7/3/49      Recertification will be due (POC Duration  / 90 days whichever is less):  23        Visit # Insurance Allowable Auth Required   34 [facility] []  Yes [x]  No      Latex Allergy:  [x]NO      []YES  Preferred Language for Healthcare:   [x]English       []other:      Pain level:  0/10     SUBJECTIVE:  Pt reports her shoulder feels much better. She has not had the shoulder pain since giving up the weighted wall slide exercise. She recently started having knee instability and is concerned  more now for her knee than her shoulder.      OBJECTIVE:      Test used Initial score Current Score  8/3/23   Pain Summary VAS  0/10 @ rest   Functional questionnaire UEFI  64/80   A/PROM flex  120/150    abd  100/100    ER  C2/60    IR  L3/45   Strength flex  3+ (within range)    Abd  4 (within range)    ER  4+ (within range)    IR  4+ (within range)         RESTRICTIONS/PRECAUTIONS:    Exercises/Interventions:     Therapeutic Ex (71380) Reps Notes/CUES   Supine Rhythmic Stabilization 2x12 ea way    Supine cane flexion x15 5#

## 2023-08-16 NOTE — PROGRESS NOTES
History of Present Illness:  Melissa Meneses returns for follow-up of her right shoulder. She is now 5 1/2 months out following reverse shoulder arthroplasty. She continues to make strides. She is willing to continue with PT if it will improve her function. She is also going to PT for her knee. She underwent ORIF of a right patella fracture by Dr. Goran Patel. She has been wearing a brace. She is pleased with both right shoulder and right knee. Medical History:  Patient's medications, allergies, past medical, surgical, social and family histories were reviewed and updated as appropriate. Pertinent items are noted in HPI  Review of systems reviewed from Patient History Form dated on 3/13/23 and available in the patient's chart under the Media tab. Vital Signs: There were no vitals filed for this visit. Constitutional: She is in no distress. She looks trim and fit and is in good spirits. Right Shoulder Examination:    Inspection:  no atrophy. WHSS    Palpation:  no crepitus with ROM    Active Range of Motion: 130/100/30/L4. Slight ST hike with elevation. Passive Range of Motion:  deferred. Strength: 4/5 ER. 4+/5 deltoid. Special Tests:  NVI distally. Radiology:     Plain radiographs of the right shoulder were not obtained today. Assessment :  Melissa Meneses is doing wonderfully after right reverse shoulder arthroplasty. She is starting to gain muscle memory. Impression:  Encounter Diagnosis   Name Primary? Status post reverse arthroplasty of right shoulder Yes       Office Procedures:  Orders Placed This Encounter   Procedures    [facility] Physical Therapy [facility]     Referral Priority:   Routine     Referral Type:   Eval and Treat     Referral Reason:   Specialty Services Required     Requested Specialty:   Physical Therapist     Number of Visits Requested:   1       Treatment Plan:  We discussed with Melissa Meneses that her long-term prognosis is excellent.  We

## 2023-08-17 ENCOUNTER — HOSPITAL ENCOUNTER (OUTPATIENT)
Dept: PHYSICAL THERAPY | Age: 68
Setting detail: THERAPIES SERIES
Discharge: HOME | End: 2023-08-17
Payer: MEDICARE

## 2023-08-17 PROCEDURE — 97161 PT EVAL LOW COMPLEX 20 MIN: CPT

## 2023-08-17 PROCEDURE — 97110 THERAPEUTIC EXERCISES: CPT

## 2023-08-17 NOTE — FLOWSHEET NOTE
ambulation/stair navigation   [] (18936)Reviewed/Progressed HEP activities related to improving balance, coordination, kinesthetic sense, posture, motor skill, proprioception of core, proximal hip and LE for self care, mobility, lifting, and ambulation/stair navigation      Manual Treatments:  PROM / STM / Oscillations-Mobs:  G-I, II, III, IV (PA's, Inf., Post.)  [] (66785) Provided manual therapy to mobilize LE, proximal hip and/or LS spine soft tissue/joints for the purpose of modulating pain, promoting relaxation,  increasing ROM, reducing/eliminating soft tissue swelling/inflammation/restriction, improving soft tissue extensibility and allowing for proper ROM for normal function with self care, mobility, lifting and ambulation. Charges  Timed Code Treatment Minutes: 30   Total Treatment Minutes: 45     [x] EVAL (LOW) 37673   [] EVAL (MOD) 83253  [] EVAL (HIGH) 57423   [] RE-EVAL     [x] RM(33278) x  2   [] IONTO  [] NMR (95074) x     [] VASO  [] Manual (17074) x      [] Other:  [] TA x      [] Mech Traction (44281)  [] ES(attended) (87486)      [] ES (un) (58248):     GOALS:  Patient stated goal: To help her right knee feel more stable so she can get back to hiking  [] Progressing: [] Met: [] Not Met: [] Adjusted     Therapist goals for Patient:   Short Term Goals: To be achieved in: 2 weeks  1. Independent in HEP and progression per patient tolerance, in order to prevent re-injury. [] Progressing: [] Met: [] Not Met: [] Adjusted  2. Patient will have a decrease in pain to facilitate improvement in movement, function, and ADLs as indicated by Functional Deficits. [] Progressing: [] Met: [] Not Met: [] Adjusted     Long Term Goals: To be achieved in: 6 weeks  1. Disability index score of 0% or more per LEFS to assist with reaching prior level of function.    [] Progressing: [] Met: [] Not Met: [] Adjusted  2 Patient will demonstrate an increase in Strength to good proximal hip strength and control, equal

## 2023-08-17 NOTE — THERAPY EVALUATION
jump   []other:    Participation Restrictions   []Reduced participation in self care activities   []Reduced participation in home management activities   []Reduced participation in work activities   []Reduced participation in social activities. [x]Reduced participation in sport/recreation activities. Classification :    []Signs/symptoms consistent with post-surgical status including decreased ROM, strength and function.    []Signs/symptoms consistent with joint sprain/strain   []Signs/symptoms consistent with patella-femoral syndrome   []Signs/symptoms consistent with knee OA/hip OA   []Signs/symptoms consistent with internal derangement of knee/Hip   []Signs/symptoms consistent with functional hip weakness/NMR control      []Signs/symptoms consistent with tendinitis/tendinosis    []signs/symptoms consistent with pathology which may benefit from Dry needling      [x]other:  instability due to meniscus tear    Prognosis/Rehab Potential:      [x]Excellent   []Good    []Fair   []Poor    Tolerance of evaluation/treatment:    [x]Excellent   []Good    []Fair   []Poor  Physical Therapy Evaluation Complexity Justification  [x] A history of present problem with:  [x] no personal factors and/or comorbidities that impact the plan of care;  []1-2 personal factors and/or comorbidities that impact the plan of care  []3 personal factors and/or comorbidities that impact the plan of care  [x] An examination of body systems using standardized tests and measures addressing any of the following: body structures and functions (impairments), activity limitations, and/or participation restrictions;:  [] a total of 1-2 or more elements   [] a total of 3 or more elements   [] a total of 4 or more elements   [x] A clinical presentation with:  [x] stable and/or uncomplicated characteristics   [] evolving clinical presentation with changing characteristics  [] unstable and unpredictable characteristics;   [x] Clinical decision making of []

## 2023-08-21 ENCOUNTER — HOSPITAL ENCOUNTER (OUTPATIENT)
Dept: PHYSICAL THERAPY | Age: 68
Setting detail: THERAPIES SERIES
Discharge: HOME | End: 2023-08-21
Payer: MEDICARE

## 2023-08-21 PROCEDURE — 97110 THERAPEUTIC EXERCISES: CPT

## 2023-08-21 NOTE — FLOWSHEET NOTE
judgement, and was responsible for assessment and treatment of the patient. Note: If patient does not return for scheduled/ recommended follow up visits, this note will serve as a discharge from care along with most recent update on progress.

## 2023-08-24 ENCOUNTER — HOSPITAL ENCOUNTER (OUTPATIENT)
Dept: PHYSICAL THERAPY | Age: 68
Setting detail: THERAPIES SERIES
Discharge: HOME | End: 2023-08-24
Payer: MEDICARE

## 2023-08-24 PROCEDURE — 97110 THERAPEUTIC EXERCISES: CPT

## 2023-08-24 NOTE — FLOWSHEET NOTE
improvement in movement, function, and ADLs as indicated by Functional Deficits. [x] Progressing: [] Met: [] Not Met: [] Adjusted     Long Term Goals: To be achieved in: 6 weeks  1. Disability index score of 0% or more per LEFS to assist with reaching prior level of function. [x] Progressing: [] Met: [] Not Met: [] Adjusted  2 Patient will demonstrate an increase in Strength to good proximal hip strength and control, equal bilaterally and 5/5 LE  without painto allow for proper functional mobility as indicated by patients Functional Deficits. [x] Progressing: [] Met: [] Not Met: [] Adjusted  3. Patient will return to recreational activities without increased symptoms or restriction. [x] Progressing: [] Met: [] Not Met: [] Adjusted    Overall Progression Towards Functional goals/ Treatment Progress Update:  [] Patient is progressing as expected towards functional goals listed. [] Progression is slowed due to complexities/Impairments listed. [] Progression has been slowed due to co-morbidities. [x] Plan just implemented, too soon to assess goals progression <30days   [] Goals require adjustment due to lack of progress  [] Patient is not progressing as expected and requires additional follow up with physician  [] Other    Prognosis for POC: [x] Good [] Fair  [] Poor      Patient requires continued skilled intervention: [x] Yes  [] No    Treatment/Activity Tolerance:  [x] Patient able to complete treatment  [] Patient limited by fatigue  [] Patient limited by pain    [] Patient limited by other medical complications  [] Other:     ASSESSMENT: Marisol Neff tolerated session very well today. We avoided exercises that would have her legs crossing midline (such as the TRX curtsey squat) and this session did not induce any back discomfort. She will continue to benefit from skilled physical therapy to maximize functional outcomes and progress towards her goals.         PLAN: See anitha  [x] Continue per plan of care []

## 2023-09-11 ENCOUNTER — OFFICE VISIT (OUTPATIENT)
Dept: ORTHOPEDIC SURGERY | Age: 68
End: 2023-09-11
Payer: MEDICARE

## 2023-09-11 ENCOUNTER — HOSPITAL ENCOUNTER (OUTPATIENT)
Dept: PHYSICAL THERAPY | Age: 68
Setting detail: THERAPIES SERIES
Discharge: HOME | End: 2023-09-11
Payer: MEDICARE

## 2023-09-11 DIAGNOSIS — S83.241D ACUTE MEDIAL MENISCUS TEAR OF RIGHT KNEE, SUBSEQUENT ENCOUNTER: Primary | ICD-10-CM

## 2023-09-11 PROCEDURE — 97140 MANUAL THERAPY 1/> REGIONS: CPT

## 2023-09-11 PROCEDURE — 99213 OFFICE O/P EST LOW 20 MIN: CPT | Performed by: PHYSICIAN ASSISTANT

## 2023-09-11 PROCEDURE — G8419 CALC BMI OUT NRM PARAM NOF/U: HCPCS | Performed by: PHYSICIAN ASSISTANT

## 2023-09-11 PROCEDURE — G8400 PT W/DXA NO RESULTS DOC: HCPCS | Performed by: PHYSICIAN ASSISTANT

## 2023-09-11 PROCEDURE — 1090F PRES/ABSN URINE INCON ASSESS: CPT | Performed by: PHYSICIAN ASSISTANT

## 2023-09-11 PROCEDURE — 97110 THERAPEUTIC EXERCISES: CPT

## 2023-09-11 PROCEDURE — G8428 CUR MEDS NOT DOCUMENT: HCPCS | Performed by: PHYSICIAN ASSISTANT

## 2023-09-11 PROCEDURE — 3017F COLORECTAL CA SCREEN DOC REV: CPT | Performed by: PHYSICIAN ASSISTANT

## 2023-09-11 PROCEDURE — 1036F TOBACCO NON-USER: CPT | Performed by: PHYSICIAN ASSISTANT

## 2023-09-11 PROCEDURE — 1123F ACP DISCUSS/DSCN MKR DOCD: CPT | Performed by: PHYSICIAN ASSISTANT

## 2023-09-11 NOTE — PROGRESS NOTES
Chief Complaint  Follow-up (Right knee )      History of Present Illness:  Jaqueline Huffman is a pleasant 77 y.o. female here for follow-up regarding her right knee. We had been following her for her right knee. MRI shows medial and lateral meniscus tears. Patient states that she is doing quite well. She responded well to physical therapy though some of the exercises exacerbated her back pain. Medical History:  Patient's medications, allergies, past medical, surgical, social and family histories were reviewed and updated as appropriate. ROS: Review of systems reviewed from Patient History Form completed today and available in the patient's chart under the Media tab. Pertinent items are noted in HPI  Review of systems reviewed from Patient History Form completed today and available in the patient's chart under the Media tab. Vital Signs: There were no vitals taken for this visit. Right knee exam    Gait: No use of assistive devices. No antalgic gait. Alignment: normal alignment. Inspection/skin: Skin is intact without erythema or ecchymosis. No gross deformity. Palpation: Marked medial joint line tenderness. no lateral joint line tenderness present. no crepitus. no pain with compression of the patella. No bursal swelling is present. Range of Motion: There is full range of motion. Strength: Normal quadriceps development. Effusion: Small effusion. No swelling present. Ligamentous stability: No cruciate or collateral ligament instability. Anterior and posterior drawer signs are negative. Lachman sign is negative. Neurologic and vascular: Skin is warm and well-perfused. There is no pretibial edema. Pulses are symmetric. Sensation is intact to light-touch    Special tests:  Positive hyperflexion test. Positive Fatmata sign. The patella apprehension sign is negative. Special tests: Negative Fatmata sign.        Radiology:       Pertinent imaging was

## 2023-09-11 NOTE — FLOWSHEET NOTE
progressing as expected towards functional goals listed. [] Progression is slowed due to complexities/Impairments listed. [] Progression has been slowed due to co-morbidities. [x] Plan just implemented, too soon to assess goals progression <30days   [] Goals require adjustment due to lack of progress  [] Patient is not progressing as expected and requires additional follow up with physician  [] Other    Prognosis for POC: [x] Good [] Fair  [] Poor      Patient requires continued skilled intervention: [x] Yes  [] No    Treatment/Activity Tolerance:  [x] Patient able to complete treatment  [] Patient limited by fatigue  [] Patient limited by pain    [] Patient limited by other medical complications  [] Other:     ASSESSMENT: Reyes Scull came in with back pain this session. We concentrated on calming modalities (heat/ STM). Her pain coming to session was 3/10 and pain after session 2/10. We will hold on further therapy for now after agreement with patient. We did discuss that while we are happy to see her again for her knee after a pause, that it would require a re-eval.      PLAN: See eval  [x] Continue per plan of care [] Alter current plan (see comments above)  [] Plan of care initiated [] Hold pending MD visit [] Discharge      Electronically signed by:  Serena Maldonado, PT,DPT, SCS; Johnny Fitzgerald SPT  Therapist was present, directed the patient's care, made skilled judgement, and was responsible for assessment and treatment of the patient. Note: If patient does not return for scheduled/ recommended follow up visits, this note will serve as a discharge from care along with most recent update on progress.

## 2023-09-14 ENCOUNTER — APPOINTMENT (OUTPATIENT)
Dept: PHYSICAL THERAPY | Age: 68
End: 2023-09-14
Payer: MEDICARE

## 2024-03-20 ENCOUNTER — OFFICE VISIT (OUTPATIENT)
Dept: ORTHOPEDIC SURGERY | Age: 69
End: 2024-03-20

## 2024-03-20 VITALS — HEIGHT: 63 IN | WEIGHT: 103 LBS | BODY MASS INDEX: 18.25 KG/M2

## 2024-03-20 DIAGNOSIS — Z96.611 STATUS POST REVERSE ARTHROPLASTY OF RIGHT SHOULDER: Primary | ICD-10-CM

## 2024-03-20 NOTE — PROGRESS NOTES
[facility]  History and Physical  Shoulder Pain    Date:  3/20/2024    Name:  Julienne Dupree  Address:  [address]  [facility] [address]    :  1955      Age:   68 y.o.    SSN:  [id number]      Medical Record Number:  [id number]    Reason for Visit:    Shoulder Pain (RIGHT SHOULDER 1YR)      HPI:   Julienne Dupree is a 68 y.o. female who presents to our office today for her one year follow up of the right reverse total shoulder arthroplasty.  Her surgery was on 3/13/2024.  She reports minor discomfort that is occasional and occurs with weather changes.  She has been able to stay active without any issues.  She just returned back from [place].  She is sleeping well at night.  Overall she is extremely happy with the results of the surgery.      Pain Assessment  Location of Pain: Shoulder  Location Modifiers: Right  Relieving Factors: Rest  Work-Related Injury: No  Are there other pain locations you wish to document?: No    No notes on file    Review of Systems:  A 14 point review of systems available in the scanned medical record as documented by the patient and reviewed on 3/20/2024.  The review is negative with the exception of those things mentioned in the History of Present Illness and Past Medical History.      Past Medical History:  Patient's medications, allergies, past medical, surgical, social and family histories were reviewed and updated as appropriate.    Allergies:  Allergies   Allergen Reactions    Percocet [Oxycodone-Acetaminophen] Nausea And Vomiting     Severe n/v; pt is able to tolerate Vicodin/hydrocodone    Sulfa Antibiotics Rash     High temp and rash -- was in her 20's when reaction occurred       Physical Exam:  There were no vitals filed for this visit.  General: Julienne Dupree is a healthy and well appearing 68 y.o. female who is sitting comfortably in our office in acute distress.     Skin:  There are no skin lesions,

## 2024-10-11 SDOH — HEALTH STABILITY: PHYSICAL HEALTH: ON AVERAGE, HOW MANY MINUTES DO YOU ENGAGE IN EXERCISE AT THIS LEVEL?: 140 MIN

## 2024-10-11 SDOH — HEALTH STABILITY: PHYSICAL HEALTH: ON AVERAGE, HOW MANY DAYS PER WEEK DO YOU ENGAGE IN MODERATE TO STRENUOUS EXERCISE (LIKE A BRISK WALK)?: 5 DAYS

## 2024-10-14 ENCOUNTER — OFFICE VISIT (OUTPATIENT)
Dept: ORTHOPEDIC SURGERY | Age: 69
End: 2024-10-14
Payer: MEDICARE

## 2024-10-14 VITALS — BODY MASS INDEX: 18.25 KG/M2 | HEIGHT: 63 IN | WEIGHT: 103 LBS

## 2024-10-14 DIAGNOSIS — M25.512 LEFT SHOULDER PAIN, UNSPECIFIED CHRONICITY: Primary | ICD-10-CM

## 2024-10-14 PROCEDURE — 1090F PRES/ABSN URINE INCON ASSESS: CPT | Performed by: PHYSICIAN ASSISTANT

## 2024-10-14 PROCEDURE — G8428 CUR MEDS NOT DOCUMENT: HCPCS | Performed by: PHYSICIAN ASSISTANT

## 2024-10-14 PROCEDURE — 99214 OFFICE O/P EST MOD 30 MIN: CPT | Performed by: PHYSICIAN ASSISTANT

## 2024-10-14 PROCEDURE — G8484 FLU IMMUNIZE NO ADMIN: HCPCS | Performed by: PHYSICIAN ASSISTANT

## 2024-10-14 PROCEDURE — G8400 PT W/DXA NO RESULTS DOC: HCPCS | Performed by: PHYSICIAN ASSISTANT

## 2024-10-14 PROCEDURE — 1123F ACP DISCUSS/DSCN MKR DOCD: CPT | Performed by: PHYSICIAN ASSISTANT

## 2024-10-14 PROCEDURE — 3017F COLORECTAL CA SCREEN DOC REV: CPT | Performed by: PHYSICIAN ASSISTANT

## 2024-10-14 PROCEDURE — G8419 CALC BMI OUT NRM PARAM NOF/U: HCPCS | Performed by: PHYSICIAN ASSISTANT

## 2024-10-14 PROCEDURE — 20610 DRAIN/INJ JOINT/BURSA W/O US: CPT | Performed by: PHYSICIAN ASSISTANT

## 2024-10-14 PROCEDURE — 1036F TOBACCO NON-USER: CPT | Performed by: PHYSICIAN ASSISTANT

## 2024-10-14 RX ORDER — METHYLPREDNISOLONE ACETATE 40 MG/ML
80 INJECTION, SUSPENSION INTRA-ARTICULAR; INTRALESIONAL; INTRAMUSCULAR; SOFT TISSUE ONCE
Status: COMPLETED | OUTPATIENT
Start: 2024-10-14 | End: 2024-10-14

## 2024-10-14 RX ADMIN — METHYLPREDNISOLONE ACETATE 80 MG: 40 INJECTION, SUSPENSION INTRA-ARTICULAR; INTRALESIONAL; INTRAMUSCULAR; SOFT TISSUE at 10:53

## 2024-10-14 RX ADMIN — Medication 8 ML: at 10:53

## 2024-10-14 NOTE — PROGRESS NOTES
[facility]  History and Physical  Shoulder Pain    Date:  10/14/2024    Name:  Julienne Dupree  Address:  [address] [place] [address]    :  1955      Age:   69 y.o.    SSN:  [id number]      Medical Record Number:  [id number]    Reason for Visit:    Shoulder Pain (OPNP LT shoulder)      HPI:   Julienne Dupree is a 69 y.o. female who presents to our office today for follow up of the left shoulder pain.  This patient is well-established with this.  She has a history of undergoing a reverse total shoulder arthroplasty on the right side that continues to do well.  Today she would like to discuss the left shoulder.  She reports this been bothering her for a few months and it was not brought on by an injury of any sort.  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  She has noticed increased pain when she is doing any overhead type movements or lifting.  She denies any numbness or tingling.  She reports that she is having pain at nighttime.  She has not had any treatment for this thus far.       No notes on file    Review of Systems:  A 14 point review of systems available in the scanned medical record as documented by the patient and reviewed on 10/14/2024.  The review is negative with the exception of those things mentioned in the History of Present Illness and Past Medical History.      Past Medical History:  Patient's medications, allergies, past medical, surgical, social and family histories were reviewed and updated as appropriate.    Allergies:  Allergies   Allergen Reactions    Percocet [Oxycodone-Acetaminophen] Nausea And Vomiting     Severe n/v; pt is able to tolerate Vicodin/hydrocodone    Sulfa Antibiotics Rash     High temp and rash -- was in her 20's when reaction occurred

## 2024-10-15 ENCOUNTER — TELEPHONE (OUTPATIENT)
Dept: ORTHOPEDIC SURGERY | Age: 69
End: 2024-10-15

## 2024-10-15 NOTE — TELEPHONE ENCOUNTER
Prescription Refill     Medication Name:  ANTI-INFLAMMATORY  Pharmacy: [facility]  Patient Contact Number:  [phone]     THE PT STATES THAT SUSHMA WAS GOING TO CALL IN A SUPER ANTI-INFLAMMATORY FOR HER LEFT SHOULDER, BUT HER PHARMACY HASN'T RECEIVED IT.

## 2024-10-16 RX ORDER — MELOXICAM 15 MG/1
15 TABLET ORAL DAILY
Qty: 30 TABLET | Refills: 3 | Status: SHIPPED | OUTPATIENT
Start: 2024-10-16 | End: 2024-11-15

## 2024-10-23 ENCOUNTER — HOSPITAL ENCOUNTER (OUTPATIENT)
Dept: PHYSICAL THERAPY | Age: 69
Setting detail: THERAPIES SERIES
Discharge: HOME | End: 2024-10-23
Payer: MEDICARE

## 2024-10-23 PROCEDURE — 97161 PT EVAL LOW COMPLEX 20 MIN: CPT

## 2024-10-23 PROCEDURE — 97110 THERAPEUTIC EXERCISES: CPT

## 2024-10-23 NOTE — THERAPY EVALUATION
[facility]- Outpatient Rehabilitation and Therapy [address], [address], [place] [address] office: [phone] fax: [phone]     Physical Therapy Initial Evaluation Certification      Dear Sushma Nagar, PA-C ,    We had the pleasure of evaluating the following patient for physical therapy services at [facility].  A summary of our findings can be found in the initial assessment below.  This includes our plan of care.  If you have any questions or concerns regarding these findings, please do not hesitate to contact me at the office phone number listed above.  Thank you for the referral.     Physician Signature:_______________________________Date:__________________  By signing above (or electronic signature), therapist’s plan is approved by physician       Physical Therapy: TREATMENT/PROGRESS NOTE   Patient: Julienne Dupree (69 y.o. female)   Examination Date: 10/23/2024   :  1955 MRN: [id number]   Visit #: 1   Insurance Allowable Auth Needed   BMN []Yes    [x]No    Insurance: Payor: MEDICARE / Plan: MEDICARE PART A AND B / Product Type: *No Product type* /   Insurance ID: [id number] - (Medicare)  Secondary Insurance (if applicable): [facility]   Treatment Diagnosis: M25.512 L Shoulder pain   Medical Diagnosis:  Left shoulder pain [M25.512]   Referring Physician: Nagar, Sushma, PA-C  PCP: Leland Pratt MD     Plan of care signed (Y/N):     Date of Patient follow up with Physician:      Plan of Care Report: EVAL today  POC update due: (10 visits /OR AUTH LIMITS, whichever is less)  2024                                             Medical History:  Comorbidities:  Osteoarthritis  Anxiety  Relevant Medical History: None of noted                                         Precautions/ Contra-indications:           Latex allergy:  NO  Pacemaker:    NO  Contraindications for Manipulation: None and NA  Date of Surgery:

## 2024-11-12 ENCOUNTER — HOSPITAL ENCOUNTER (OUTPATIENT)
Dept: PHYSICAL THERAPY | Age: 69
Setting detail: THERAPIES SERIES
Discharge: HOME | End: 2024-11-12
Payer: MEDICARE

## 2024-11-12 PROCEDURE — 97110 THERAPEUTIC EXERCISES: CPT

## 2024-11-12 NOTE — FLOWSHEET NOTE
[facility]- Outpatient Rehabilitation and Therapy [address], [address], [place] [address] office: [phone] fax: [phone]     Physical Therapy Initial Evaluation Certification      Dear Sushma Nagar, PA-C ,    We had the pleasure of evaluating the following patient for physical therapy services at [facility].  A summary of our findings can be found in the initial assessment below.  This includes our plan of care.  If you have any questions or concerns regarding these findings, please do not hesitate to contact me at the office phone number listed above.  Thank you for the referral.     Physician Signature:_______________________________Date:__________________  By signing above (or electronic signature), therapist’s plan is approved by physician       Physical Therapy: TREATMENT/PROGRESS NOTE   Patient: Julienne Dupree (69 y.o. female)   Examination Date: 2024   :  1955 MRN: [id number]   Visit #: 2   Insurance Allowable Auth Needed   BMN []Yes    [x]No    Insurance: Payor: MEDICARE / Plan: MEDICARE PART A AND B / Product Type: *No Product type* /   Insurance ID: [id number] - (Medicare)  Secondary Insurance (if applicable): [facility]   Treatment Diagnosis: M25.512 L Shoulder pain   Medical Diagnosis:  Left shoulder pain [M25.512]   Referring Physician: Nagar, Sushma, PA-C  PCP: Leland Pratt MD     Plan of care signed (Y/N):     Date of Patient follow up with Physician:      Plan of Care Report: EVAL today  POC update due: (10 visits /OR AUTH LIMITS, whichever is less)  2024                                             Medical History:  Comorbidities:  Osteoarthritis  Anxiety  Relevant Medical History: None of noted                                         Precautions/ Contra-indications:           Latex allergy:  NO  Pacemaker:    NO  Contraindications for Manipulation: None and NA  Date of Surgery: 
great progress in regard to to her HEP compliance. She demonstrated good activity tolerance and muscle recruitment. She would continue to benefit from skilled PT in order to address remaining deficits to maximize functional mobility.    Medical Necessity Documentation:  I certify that this patient meets the below criteria necessary for medical necessity for care and/or justification of therapy services:  The patient has functional impairments and/or activity limitations and would benefit from continued outpatient therapy services to address the deficits outlined in the patients goals    Return to Play: NA    Prognosis for POC: [x] Good [] Fair  [] Poor    Patient requires continued skilled intervention: [x] Yes  [] No      CHARGE CAPTURE     PT CHARGE GRID   CPT Code (TIMED) minutes # CPT Code (UNTIMED) #     Therex (74456)  40 3  EVAL:LOW (43427 - Typically 20 minutes face-to-face)     Neuromusc. Re-ed (91469)    Re-Eval (50403)     Manual (53023)    Estim Unattended (34899)     Ther. Act (68366)    Mech. Traction (20958)     Gait (19972)    Dry Needle 1-2 muscle (68158)     Aquatic Therex (37380)    Dry Needle 3+ muscle (66275)     Iontophoresis (54266)    VASO (72097)     Ultrasound (09452)    Group Therapy (79502)     Estim Attended (65063)    Canalith Repositioning (62323)     Physical Performance Test (06095)         Other:    Other:    Total Timed Code Tx Minutes 40 3       Total Treatment Minutes         Charge Justification:  (93025) THERAPEUTIC EXERCISE - Provided verbal/tactile cueing for activities related to strengthening, flexibility, endurance, ROM performed to prevent loss of range of motion, maintain or improve muscular strength or increase flexibility, following either an injury or surgery.   (46424) HOME EXERCISE PROGRAM - Reviewed/Progressed HEP activities related to strengthening, flexibility, endurance, ROM performed to prevent loss of range of motion, maintain or improve muscular strength or

## 2025-03-25 ENCOUNTER — OFFICE VISIT (OUTPATIENT)
Dept: ORTHOPEDIC SURGERY | Age: 70
End: 2025-03-25

## 2025-03-25 VITALS — HEIGHT: 63 IN | WEIGHT: 103 LBS | BODY MASS INDEX: 18.25 KG/M2

## 2025-03-25 DIAGNOSIS — Z96.611 STATUS POST REVERSE ARTHROPLASTY OF RIGHT SHOULDER: Primary | ICD-10-CM

## 2025-03-28 NOTE — PROGRESS NOTES
Chief Complaint    Shoulder Pain (CK R SHOULDER)      History of Present Illness:  Julienne Dupree is a pleasant, 69 y.o., female, here today for follow up of her right shoulder. The patient is now 1 year out following a reverse total shoulder arthroplasty of her right shoulder that occurred on 3/13/24.  However the patient was last seen in our office on 10/14/24 for her left shoulder as she had started experiencing left shoulder pain that was diagnosed as rotator cuff and biceps tendinitis. At that visit she received a corticosteroid injection into her left shoulder and was given a prescription for physical therapy. She reports at today's visit that both her left and right shoulder are doing very well and she is having very little to no pain with either shoulder. She reports no new injuries or setbacks.     Of note she does present today in a walking boot on her right foot. She reports that she has a 5th metatarsal fracture and is asking a referral for a foot specialist.     Medical History:  Patient's medications, allergies, past medical, surgical, social and family histories were reviewed and updated as appropriate.    No notes on file    Review of Systems  A 14 point review of systems was completed by the patient and is available in the media section of the scanned medical record and was reviewed on 3/28/2025.  The review is negative with the exception of those things mentioned in the HPI and Past Medical History    Vital Signs:  There were no vitals filed for this visit.    General/Appearance: Alert and oriented and in no apparent distress.    Skin:  There are no skin lesions, cellulitis, or extreme edema. The patient has warm and well-perfused Bilateral upper extremities with brisk capillary refill.      Right Shoulder Exam:  Inspection: No gross deformities, no signs of infection.    Palpation: Deferred    Active Range of Motion:  Forward Elevation 150, External Rotation 45, Internal Rotation T10    Passive

## 2025-03-31 ENCOUNTER — OFFICE VISIT (OUTPATIENT)
Dept: ORTHOPEDIC SURGERY | Age: 70
End: 2025-03-31
Payer: MEDICARE

## 2025-03-31 DIAGNOSIS — M17.11 PRIMARY OSTEOARTHRITIS OF RIGHT KNEE: Primary | ICD-10-CM

## 2025-03-31 DIAGNOSIS — M25.561 RIGHT KNEE PAIN, UNSPECIFIED CHRONICITY: ICD-10-CM

## 2025-03-31 DIAGNOSIS — S83.521A RUPTURE OF POSTERIOR CRUCIATE LIGAMENT OF RIGHT KNEE, INITIAL ENCOUNTER: ICD-10-CM

## 2025-03-31 PROCEDURE — G8419 CALC BMI OUT NRM PARAM NOF/U: HCPCS | Performed by: ORTHOPAEDIC SURGERY

## 2025-03-31 PROCEDURE — 1090F PRES/ABSN URINE INCON ASSESS: CPT | Performed by: ORTHOPAEDIC SURGERY

## 2025-03-31 PROCEDURE — G8400 PT W/DXA NO RESULTS DOC: HCPCS | Performed by: ORTHOPAEDIC SURGERY

## 2025-03-31 PROCEDURE — 1123F ACP DISCUSS/DSCN MKR DOCD: CPT | Performed by: ORTHOPAEDIC SURGERY

## 2025-03-31 PROCEDURE — G8428 CUR MEDS NOT DOCUMENT: HCPCS | Performed by: ORTHOPAEDIC SURGERY

## 2025-03-31 PROCEDURE — 3017F COLORECTAL CA SCREEN DOC REV: CPT | Performed by: ORTHOPAEDIC SURGERY

## 2025-03-31 PROCEDURE — 1036F TOBACCO NON-USER: CPT | Performed by: ORTHOPAEDIC SURGERY

## 2025-03-31 PROCEDURE — 99214 OFFICE O/P EST MOD 30 MIN: CPT | Performed by: ORTHOPAEDIC SURGERY

## 2025-03-31 NOTE — PROGRESS NOTES
consistent with a PCL tear. Diagnosis and treatment options were discussed. This should not require surgical intervention. It is too early for her to get another corticosteroid injection, not eligible for another month. I believe she is a candidate for formal, supervised physical therapy for her knee OA and well as PCL tear. She wishes to proceed. We would like to avoid oral anti-inflammatories due to her healing fracture therefore she can use Tylenol arthritis and topical voltaren gel over the knee.    I will see her back in 1 month, sooner if symptoms worsen. Julienne Dupree is in agreement with this plan. All questions were answered to patient's satisfaction and was encouraged to call with any further questions.      Orders Placed This Encounter   Procedures    XR KNEE RIGHT (MIN 4 VIEWS)     Standing Status:   Future     Number of Occurrences:   1     Expected Date:   3/31/2025     Expiration Date:   3/31/2026     Reason for exam::   pain    XR KNEE LEFT (3 VIEWS)     Standing Status:   Future     Number of Occurrences:   1     Expected Date:   3/31/2025     Expiration Date:   3/31/2026     Reason for exam::   pain         I, Chloe Alexander ATC, am scribing for and in the presence of Dr. Camilo Ramos.   03/31/25 9:39 AM Chloe Alexander ATC        I attest that I met personally with the patient, performed the described exam, reviewed the radiographic studies and medical records associated with this patient and supervised the services that are described above.     Camilo Ramos MD

## 2025-04-08 ENCOUNTER — HOSPITAL ENCOUNTER (OUTPATIENT)
Dept: PHYSICAL THERAPY | Age: 70
Setting detail: THERAPIES SERIES
Discharge: HOME | End: 2025-04-08
Payer: MEDICARE

## 2025-04-08 PROCEDURE — 97161 PT EVAL LOW COMPLEX 20 MIN: CPT

## 2025-04-08 PROCEDURE — 97110 THERAPEUTIC EXERCISES: CPT

## 2025-04-08 NOTE — THERAPY EVALUATION
[facility] - Outpatient Rehabilitation and Therapy: [address], [address], [place] [address] office: [phone] fax: [phone]     Physical Therapy Initial Evaluation Certification      Dear Camilo Ramos MD ,    We had the pleasure of evaluating the following patient for physical therapy services at [facility].  A summary of our findings can be found in the initial assessment below.  This includes our plan of care.  If you have any questions or concerns regarding these findings, please do not hesitate to contact me at the office phone number listed above.  Thank you for the referral.     Physician Signature:_______________________________Date:__________________  By signing above (or electronic signature), therapist’s plan is approved by physician       Physical Therapy: TREATMENT/PROGRESS NOTE   Patient: Julienne Dupree (69 y.o. female)   Examination Date: 2025   :  1955 MRN: [id number]   Visit #: 1   Insurance Allowable Auth Needed   [place] []Yes    []No    Insurance: Payor: MEDICARE / Plan: MEDICARE PART A AND B / Product Type: *No Product type* /   Insurance ID: [id number] - (Medicare)  Secondary Insurance (if applicable): [facility]   Treatment Diagnosis:   R Knee Pain M25.561   Medical Diagnosis:  Right knee pain, unspecified chronicity [M25.561]  Primary osteoarthritis of right knee [M17.11]  Rupture of posterior cruciate ligament of right knee, initial encounter [N06.484U]   Referring Physician: Camilo Ramos MD  PCP: Leland Pratt MD     Plan of care signed (Y/N):     Date of Patient follow up with Physician:      Plan of Care Report: EVAL today  POC update due: (10 visits /OR AUTH LIMITS, whichever is less)  2025                                             Medical History:  Comorbidities:  None  Relevant Medical History: none of note                                         Precautions/ Contra-indications:

## 2025-05-05 ENCOUNTER — OFFICE VISIT (OUTPATIENT)
Dept: ORTHOPEDIC SURGERY | Age: 70
End: 2025-05-05
Payer: MEDICARE

## 2025-05-05 VITALS — WEIGHT: 110 LBS | HEIGHT: 63 IN | BODY MASS INDEX: 19.49 KG/M2

## 2025-05-05 DIAGNOSIS — S83.521A RUPTURE OF POSTERIOR CRUCIATE LIGAMENT OF RIGHT KNEE, INITIAL ENCOUNTER: ICD-10-CM

## 2025-05-05 DIAGNOSIS — M17.11 PRIMARY OSTEOARTHRITIS OF RIGHT KNEE: Primary | ICD-10-CM

## 2025-05-05 DIAGNOSIS — M25.561 RIGHT KNEE PAIN, UNSPECIFIED CHRONICITY: ICD-10-CM

## 2025-05-05 PROCEDURE — 1123F ACP DISCUSS/DSCN MKR DOCD: CPT | Performed by: ORTHOPAEDIC SURGERY

## 2025-05-05 PROCEDURE — 1036F TOBACCO NON-USER: CPT | Performed by: ORTHOPAEDIC SURGERY

## 2025-05-05 PROCEDURE — G8420 CALC BMI NORM PARAMETERS: HCPCS | Performed by: ORTHOPAEDIC SURGERY

## 2025-05-05 PROCEDURE — 1159F MED LIST DOCD IN RCRD: CPT | Performed by: ORTHOPAEDIC SURGERY

## 2025-05-05 PROCEDURE — G8427 DOCREV CUR MEDS BY ELIG CLIN: HCPCS | Performed by: ORTHOPAEDIC SURGERY

## 2025-05-05 PROCEDURE — 99213 OFFICE O/P EST LOW 20 MIN: CPT | Performed by: ORTHOPAEDIC SURGERY

## 2025-05-05 PROCEDURE — G8400 PT W/DXA NO RESULTS DOC: HCPCS | Performed by: ORTHOPAEDIC SURGERY

## 2025-05-05 PROCEDURE — 20610 DRAIN/INJ JOINT/BURSA W/O US: CPT | Performed by: ORTHOPAEDIC SURGERY

## 2025-05-05 PROCEDURE — 1090F PRES/ABSN URINE INCON ASSESS: CPT | Performed by: ORTHOPAEDIC SURGERY

## 2025-05-05 PROCEDURE — 1125F AMNT PAIN NOTED PAIN PRSNT: CPT | Performed by: ORTHOPAEDIC SURGERY

## 2025-05-05 PROCEDURE — 3017F COLORECTAL CA SCREEN DOC REV: CPT | Performed by: ORTHOPAEDIC SURGERY

## 2025-05-05 RX ORDER — METHYLPREDNISOLONE ACETATE 40 MG/ML
40 INJECTION, SUSPENSION INTRA-ARTICULAR; INTRALESIONAL; INTRAMUSCULAR; SOFT TISSUE ONCE
Status: COMPLETED | OUTPATIENT
Start: 2025-05-05 | End: 2025-05-05

## 2025-05-05 RX ADMIN — METHYLPREDNISOLONE ACETATE 40 MG: 40 INJECTION, SUSPENSION INTRA-ARTICULAR; INTRALESIONAL; INTRAMUSCULAR; SOFT TISSUE at 13:11

## 2025-05-05 RX ADMIN — Medication 4 ML: at 13:11
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
## 2025-05-12 ENCOUNTER — HOSPITAL ENCOUNTER (OUTPATIENT)
Dept: PHYSICAL THERAPY | Age: 70
Setting detail: THERAPIES SERIES
Discharge: HOME | End: 2025-05-12
Payer: MEDICARE

## 2025-05-12 PROCEDURE — 97110 THERAPEUTIC EXERCISES: CPT

## 2025-05-12 NOTE — THERAPY RECERTIFICATION
[facility]: [address], [address], [place] [address] office: [phone] fax: [phone]     Physical Therapy Re-Certification Plan of Care    Dear Camilo Ramos MD  ,    We had the pleasure of treating the following patient for physical therapy services at [facility]. A summary of our findings can be found in the updated assessment below.  This includes our plan of care.  If you have any questions or concerns regarding these findings, please do not hesitate to contact me at the office phone number checked above.  Thank you for the referral.     Physician Signature:________________________________Date:__________________  By signing above (or electronic signature), therapist's plan is approved by physician      Total Visits: 2     Overall Response to Treatment:  Patient is responding well to treatment and improvement is noted with regards to goals    Recommendation:    [] Continue PT 1x / wk for 4 weeks.   [] Hold PT, pending MD visit   [] Discharge to [facility]. Follow up with PT or MD PRN.       Physical Therapy: TREATMENT/PROGRESS NOTE   Patient: Julienne Dupree (69 y.o. female)   Examination Date: 2025   :  1955 MRN: [id number]   Visit #: 2   Insurance Allowable Auth Needed   [place] []Yes    []No    Insurance: Payor: MEDICARE / Plan: MEDICARE PART A AND B / Product Type: *No Product type* /   Insurance ID: [id number] - (Medicare)  Secondary Insurance (if applicable): [facility]   Treatment Diagnosis:   R Knee Pain M25.561   Medical Diagnosis:  Right knee pain, unspecified chronicity [M25.561]  Primary osteoarthritis of right knee [M17.11]  Rupture of posterior cruciate ligament of right knee, initial encounter [O18.678N]   Referring Physician: Camilo Ramos MD  PCP: Leland Pratt MD     Plan of care signed (Y/N):     Date of Patient follow up with Physician:      Plan of Care Report:

## 2025-07-28 ENCOUNTER — OFFICE VISIT (OUTPATIENT)
Dept: ORTHOPEDIC SURGERY | Age: 70
End: 2025-07-28

## 2025-07-28 DIAGNOSIS — M17.11 PRIMARY OSTEOARTHRITIS OF RIGHT KNEE: Primary | ICD-10-CM

## 2025-07-28 RX ORDER — TRIAMCINOLONE ACETONIDE 40 MG/ML
80 INJECTION, SUSPENSION INTRA-ARTICULAR; INTRAMUSCULAR ONCE
Status: SHIPPED | OUTPATIENT
Start: 2025-07-28

## 2025-07-28 RX ORDER — ROPIVACAINE HYDROCHLORIDE 5 MG/ML
30 INJECTION, SOLUTION EPIDURAL; INFILTRATION; PERINEURAL ONCE
Status: SHIPPED | OUTPATIENT
Start: 2025-07-28

## 2025-07-28 NOTE — PROGRESS NOTES
Chief Complaint    Follow-up (Right knee )      History of Present Illness:  History of Present Illness  The patient is a 70-year-old lady who presents for a follow-up of right knee pain.    She has a known history of right knee osteoarthritis and received a steroid injection on 05/05/2025, which provided some relief. However, she has recently been experiencing an increase in symptoms. Her pain is associated with prolonged standing and walking, and she also reports stiffness. She is requesting a repeat steroid injection.    Medical History:  Patient's medications, allergies, past medical, surgical, social and family histories were reviewed and updated as appropriate.    Pertinent items are noted in HPI  Review of systems reviewed from Patient History Form completed today and available in the patient's chart under the Media tab.            Past Medical History:   Diagnosis Date    Anxiety     Cancer (HCC)     COLON    Osteoarthritis     Osteoporosis         Past Surgical History:   Procedure Laterality Date    COLECTOMY  2009    COLONOSCOPY      POLYPS AND COLON CANCER    COLONOSCOPY  07/20/2015    ROTATOR CUFF REPAIR Bilateral     SHOULDER ARTHROSCOPY Right 06/13/2022    EVALUATION UNDER ANESTHESIA, RIGHT SHOULDER DIAGNOSTIC ARTHROSCOPY, EXTENSIVE DEBRIDEMENT, LYSIS OF ADHESIONS, REVISION OF RIGHT ROTATOR CUFF REPAIR WITH PATCH AUGMENTATION. REMOVAL OF RETAINED SUTURE. performed by Corrie Schumacher MD at [facility] OR    SHOULDER SURGERY      SHOULDER SURGERY Right 3/13/2023    RIGHT SHOULDER ARTHROTOMY, LYSIS OF ADHESIONS, REMOVAL OF SUTURE ANCHOR, REVERSE TOTAL SHOULDER REPLACEMENT performed by Corrie Schumacher MD at [facility] OR       Family History   Problem Relation Age of Onset    Cancer Brother         Lung    Osteoporosis Mother     Cancer Father         Lung    Cancer Sister        Social History     Socioeconomic History    Marital status:    Tobacco Use    Smoking status: Former     Current packs/day: 0.00

## 2025-08-12 ENCOUNTER — OFFICE VISIT (OUTPATIENT)
Dept: ORTHOPEDIC SURGERY | Age: 70
End: 2025-08-12

## 2025-08-12 VITALS — HEIGHT: 63 IN | BODY MASS INDEX: 19.49 KG/M2 | WEIGHT: 110 LBS

## 2025-08-12 DIAGNOSIS — Z96.611 STATUS POST REVERSE ARTHROPLASTY OF RIGHT SHOULDER: Primary | ICD-10-CM

## (undated) DEVICE — MAT FLR W32XL58IN

## (undated) DEVICE — SST TWIST DRILL, STANDARD, 2.4MM DIA. X 127MM: Brand: MICROAIRE®

## (undated) DEVICE — GLOVE SURG SZ 8 L12IN FNGR THK75MIL WHT LTX POLYMER BEAD

## (undated) DEVICE — DRAPE ADAPTABLE ARM POSITIONER

## (undated) DEVICE — TOWEL,STOP FLAG GOLD N-W: Brand: MEDLINE

## (undated) DEVICE — SHOULDER ARTHROSCOPY: Brand: MEDLINE INDUSTRIES, INC.

## (undated) DEVICE — TUBING FLD MGMT Y DBL SPIK DUALWAVE

## (undated) DEVICE — TUBING PMP L16FT MAIN DISP FOR AR-6400 AR-6475

## (undated) DEVICE — SOLUTION IV IRRIG LACTATED RINGERS 3000ML 2B7487

## (undated) DEVICE — SOLUTION IV 1000ML 0.9% SOD CHL

## (undated) DEVICE — GOWN,REINFRCE,POLY,ECLIPSE,SLV,3XLG: Brand: MEDLINE

## (undated) DEVICE — GOWN,SIRUS,POLYRNF,BRTHSLV,XL,30/CS: Brand: MEDLINE

## (undated) DEVICE — GOWN,SIRUS,POLYRNF,BRTHSLV,XLN/XXL,18/CS: Brand: MEDLINE

## (undated) DEVICE — SUTURE MCRYL SZ 4-0 L27IN ABSRB UD L19MM PS-2 1/2 CIR PRIM Y426H

## (undated) DEVICE — SUTURE VCRL SZ 0 L18IN ABSRB UD L36MM CT-1 1/2 CIR J840D

## (undated) DEVICE — SPONGE GZ W4XL8IN COT WVN 12 PLY

## (undated) DEVICE — UNDERGLOVE SURG SZ 8 BLU LTX FREE SYN POLYISOPRENE POLYMER

## (undated) DEVICE — PROBE ABLAT XL 90DEG ASPIR BPLR RF 1 PC ELECTRD ERGO HNDL

## (undated) DEVICE — SOLUTION IV IRRIG WATER 1000ML POUR BRL 2F7114

## (undated) DEVICE — SYSTEM SKIN CLSR 22CM DERMBND PRINEO

## (undated) DEVICE — SUTURE PDS II SZ 1 L27IN ABSRB VLT CT-1 L36MM 1/2 CIR Z341H

## (undated) DEVICE — ANCHOR SUT L14.7MM DIA5.5MM BIOCOMPOSITE W/ 3 SZ 2
Type: IMPLANTABLE DEVICE | Site: SHOULDER | Status: NON-FUNCTIONAL
Removed: 2022-06-13

## (undated) DEVICE — 3M™ COBAN™ NL STERILE NON-LATEX SELF-ADHERENT WRAP, 2086S, 6 IN X 5 YD (15 CM X 4,5 M), 12 ROLLS/CASE: Brand: 3M™ COBAN™

## (undated) DEVICE — PAD DRY FLOOR ABS 32X58IN GRN

## (undated) DEVICE — SUTURE VCRL SZ 3-0 L27IN ABSRB UD L26MM CT-2 1/2 CIR J232H

## (undated) DEVICE — CANNULA ARTHSCP L7CM DIA7MM TRNSLUC THRD FLX W/ NO SQUIRT

## (undated) DEVICE — 3M™ IOBAN™ 2 ANTIMICROBIAL INCISE DRAPE 6640EZ: Brand: IOBAN™ 2

## (undated) DEVICE — SUTURE VCRL SZ 2-0 L18IN ABSRB UD CT-1 L36MM 1/2 CIR J839D

## (undated) DEVICE — SUTURE ETHBND EXCEL SZ 2 L30IN NONABSORBABLE GRN L40MM V-37 MX69G

## (undated) DEVICE — PUDDLEVAC FLOOR SUCTION DEVICE: Brand: PUDDLEVAC

## (undated) DEVICE — BLADE SHV L13CM DIA5MM EXCALIBUR AGG COOLCUT

## (undated) DEVICE — TOTAL SHOULDER: Brand: MEDLINE INDUSTRIES, INC.

## (undated) DEVICE — BUR SHAVER 5 MMX13 CM 8 FLUT OVL FOR AGGRESSIVE BNE COOLCUT

## (undated) DEVICE — SOLUTION IRRIG 3000ML 0.9% SOD CHL USP UROMATIC PLAS CONT

## (undated) DEVICE — GLOVE ORANGE PI 8   MSG9080

## (undated) DEVICE — TUBING PMP L6FT CONT WAVE EXTN

## (undated) DEVICE — SUTURE FIBERWIRE SZ 2 W/ TAPERED NEEDLE BLUE L38IN NONABSORB BLU L26.5MM 1/2 CIRCLE AR7200

## (undated) DEVICE — GLOVE ORTHO 8   MSG9480